# Patient Record
Sex: MALE | Race: WHITE | NOT HISPANIC OR LATINO | Employment: FULL TIME | URBAN - METROPOLITAN AREA
[De-identification: names, ages, dates, MRNs, and addresses within clinical notes are randomized per-mention and may not be internally consistent; named-entity substitution may affect disease eponyms.]

---

## 2017-01-09 ENCOUNTER — APPOINTMENT (OUTPATIENT)
Dept: URGENT CARE | Age: 52
End: 2017-01-09
Payer: COMMERCIAL

## 2017-01-09 ENCOUNTER — HOSPITAL ENCOUNTER (OUTPATIENT)
Dept: RADIOLOGY | Age: 52
Discharge: HOME | End: 2017-01-09
Admitting: NURSE PRACTITIONER
Payer: COMMERCIAL

## 2017-01-09 ENCOUNTER — TRANSCRIBE ORDERS (OUTPATIENT)
Dept: URGENT CARE | Age: 52
End: 2017-01-09

## 2017-01-09 DIAGNOSIS — S59.919A INJURY OF FOREARM: ICD-10-CM

## 2017-01-09 PROCEDURE — G0382 LEV 3 HOSP TYPE B ED VISIT: HCPCS | Performed by: FAMILY MEDICINE

## 2017-01-09 PROCEDURE — 73080 X-RAY EXAM OF ELBOW: CPT

## 2017-01-09 PROCEDURE — 73090 X-RAY EXAM OF FOREARM: CPT

## 2017-01-26 ENCOUNTER — LAB CONVERSION - ENCOUNTER (OUTPATIENT)
Dept: OTHER | Facility: OTHER | Age: 52
End: 2017-01-26

## 2017-01-26 LAB
CHOLEST SERPL-MCNC: 124 MG/DL (ref 125–200)
CHOLEST/HDLC SERPL: 2.6 (CALC)
HDLC SERPL-MCNC: 47 MG/DL
LDL CHOLESTEROL (HISTORICAL): 56 MG/DL (CALC)
NON-HDL-CHOL (CHOL-HDL) (HISTORICAL): 77 MG/DL (CALC)
TRIGL SERPL-MCNC: 103 MG/DL

## 2017-02-09 ENCOUNTER — ALLSCRIPTS OFFICE VISIT (OUTPATIENT)
Dept: OTHER | Facility: OTHER | Age: 52
End: 2017-02-09

## 2017-04-17 ENCOUNTER — ALLSCRIPTS OFFICE VISIT (OUTPATIENT)
Dept: OTHER | Facility: OTHER | Age: 52
End: 2017-04-17

## 2017-04-17 ENCOUNTER — TRANSCRIBE ORDERS (OUTPATIENT)
Dept: ADMINISTRATIVE | Facility: HOSPITAL | Age: 52
End: 2017-04-17

## 2017-04-17 DIAGNOSIS — E78.5 HYPERLIPIDEMIA: ICD-10-CM

## 2017-04-17 DIAGNOSIS — Z95.5 PRESENCE OF CORONARY ANGIOPLASTY IMPLANT AND GRAFT: ICD-10-CM

## 2017-04-17 DIAGNOSIS — I25.10 ATHEROSCLEROTIC HEART DISEASE OF NATIVE CORONARY ARTERY WITHOUT ANGINA PECTORIS: ICD-10-CM

## 2017-04-17 DIAGNOSIS — Z95.5 STENTED CORONARY ARTERY: Primary | ICD-10-CM

## 2017-04-17 DIAGNOSIS — I10 ESSENTIAL (PRIMARY) HYPERTENSION: ICD-10-CM

## 2017-04-21 ENCOUNTER — HOSPITAL ENCOUNTER (OUTPATIENT)
Dept: NON INVASIVE DIAGNOSTICS | Facility: CLINIC | Age: 52
Discharge: HOME | End: 2017-04-21
Payer: COMMERCIAL

## 2017-04-21 ENCOUNTER — ALLSCRIPTS OFFICE VISIT (OUTPATIENT)
Dept: OTHER | Facility: OTHER | Age: 52
End: 2017-04-21

## 2017-04-21 DIAGNOSIS — Z95.5 STENTED CORONARY ARTERY: ICD-10-CM

## 2017-04-21 LAB
ARRHY DURING EX: NORMAL
CHEST PAIN STATEMENT: NORMAL
MAX DIASTOLIC BP: 70 MMHG
MAX HEART RATE: 130 BPM
MAX PREDICTED HEART RATE: 170 BPM
MAX. SYSTOLIC BP: 204 MMHG
PROTOCOL NAME: NORMAL
REASON FOR TERMINATION: NORMAL
TARGET HR FORMULA: NORMAL
TEST INDICATION: NORMAL
TIME IN EXERCISE PHASE: 690 S

## 2017-04-21 PROCEDURE — 93017 CV STRESS TEST TRACING ONLY: CPT

## 2017-04-21 PROCEDURE — A9502 TC99M TETROFOSMIN: HCPCS

## 2017-04-21 PROCEDURE — 78452 HT MUSCLE IMAGE SPECT MULT: CPT

## 2017-04-22 ENCOUNTER — LAB CONVERSION - ENCOUNTER (OUTPATIENT)
Dept: OTHER | Facility: OTHER | Age: 52
End: 2017-04-22

## 2017-04-22 LAB
DEPRECATED RDW RBC AUTO: 12.5 % (ref 11–15)
HCT VFR BLD AUTO: 43.6 % (ref 38.5–50)
HGB BLD-MCNC: 14.6 G/DL (ref 13.2–17.1)
MCH RBC QN AUTO: 30.8 PG (ref 27–33)
MCHC RBC AUTO-ENTMCNC: 33.5 G/DL (ref 32–36)
MCV RBC AUTO: 92.2 FL (ref 80–100)
PLATELET # BLD AUTO: 193 THOUSAND/UL (ref 140–400)
PMV BLD AUTO: 9.4 FL (ref 7.5–12.5)
RBC # BLD AUTO: 4.73 MILLION/UL (ref 4.2–5.8)
WBC # BLD AUTO: 6.6 THOUSAND/UL (ref 3.8–10.8)

## 2017-04-23 ENCOUNTER — LAB CONVERSION - ENCOUNTER (OUTPATIENT)
Dept: OTHER | Facility: OTHER | Age: 52
End: 2017-04-23

## 2017-04-23 LAB
A/G RATIO (HISTORICAL): 1.7 (CALC) (ref 1–2.5)
ALBUMIN SERPL BCP-MCNC: 4.2 G/DL (ref 3.6–5.1)
ALP SERPL-CCNC: 79 U/L (ref 40–115)
ALT SERPL W P-5'-P-CCNC: 24 U/L (ref 9–46)
AST SERPL W P-5'-P-CCNC: 20 U/L (ref 10–35)
BILIRUB SERPL-MCNC: 0.9 MG/DL (ref 0.2–1.2)
BUN SERPL-MCNC: 14 MG/DL (ref 7–25)
BUN/CREA RATIO (HISTORICAL): NORMAL (CALC) (ref 6–22)
CALCIUM SERPL-MCNC: 9.1 MG/DL (ref 8.6–10.3)
CHLORIDE SERPL-SCNC: 105 MMOL/L (ref 98–110)
CO2 SERPL-SCNC: 28 MMOL/L (ref 20–31)
CREAT SERPL-MCNC: 0.9 MG/DL (ref 0.7–1.33)
DEPRECATED RDW RBC AUTO: 12.5 % (ref 11–15)
EGFR AFRICAN AMERICAN (HISTORICAL): 115 ML/MIN/1.73M2
EGFR-AMERICAN CALC (HISTORICAL): 99 ML/MIN/1.73M2
GAMMA GLOBULIN (HISTORICAL): 2.5 G/DL (CALC) (ref 1.9–3.7)
GLUCOSE (HISTORICAL): 98 MG/DL (ref 65–99)
HCT VFR BLD AUTO: 43.6 % (ref 38.5–50)
HGB BLD-MCNC: 14.6 G/DL (ref 13.2–17.1)
INR PPP: 1
MCH RBC QN AUTO: 30.8 PG (ref 27–33)
MCHC RBC AUTO-ENTMCNC: 33.5 G/DL (ref 32–36)
MCV RBC AUTO: 92.2 FL (ref 80–100)
PLATELET # BLD AUTO: 193 THOUSAND/UL (ref 140–400)
PMV BLD AUTO: 9.4 FL (ref 7.5–12.5)
POTASSIUM SERPL-SCNC: 4.4 MMOL/L (ref 3.5–5.3)
PROTHROMBIN TIME: 10.1 SEC (ref 9–11.5)
RBC # BLD AUTO: 4.73 MILLION/UL (ref 4.2–5.8)
SODIUM SERPL-SCNC: 139 MMOL/L (ref 135–146)
TOTAL PROTEIN (HISTORICAL): 6.7 G/DL (ref 6.1–8.1)
WBC # BLD AUTO: 6.6 THOUSAND/UL (ref 3.8–10.8)

## 2017-04-24 ENCOUNTER — HOSPITAL ENCOUNTER (OUTPATIENT)
Dept: NON INVASIVE DIAGNOSTICS | Facility: HOSPITAL | Age: 52
Discharge: HOME | End: 2017-04-25
Attending: INTERNAL MEDICINE | Admitting: INTERNAL MEDICINE
Payer: COMMERCIAL

## 2017-04-24 ENCOUNTER — GENERIC CONVERSION - ENCOUNTER (OUTPATIENT)
Dept: OTHER | Facility: OTHER | Age: 52
End: 2017-04-24

## 2017-04-24 DIAGNOSIS — E78.5 HYPERLIPIDEMIA: ICD-10-CM

## 2017-04-24 DIAGNOSIS — I10 ESSENTIAL (PRIMARY) HYPERTENSION: ICD-10-CM

## 2017-04-24 DIAGNOSIS — I25.10 ATHEROSCLEROTIC HEART DISEASE OF NATIVE CORONARY ARTERY WITHOUT ANGINA PECTORIS: ICD-10-CM

## 2017-04-24 DIAGNOSIS — Z95.5 PRESENCE OF CORONARY ANGIOPLASTY IMPLANT AND GRAFT: ICD-10-CM

## 2017-04-24 LAB
ANION GAP SERPL CALCULATED.3IONS-SCNC: 10 MMOL/L (ref 4–13)
ATRIAL RATE: 56 BPM
ATRIAL RATE: 61 BPM
BUN SERPL-MCNC: 20 MG/DL (ref 5–25)
CALCIUM SERPL-MCNC: 8.9 MG/DL (ref 8.3–10.1)
CHLORIDE SERPL-SCNC: 105 MMOL/L (ref 100–108)
CHOLEST SERPL-MCNC: 110 MG/DL (ref 50–200)
CO2 SERPL-SCNC: 25 MMOL/L (ref 21–32)
CREAT SERPL-MCNC: 0.93 MG/DL (ref 0.6–1.3)
ERYTHROCYTE [DISTWIDTH] IN BLOOD BY AUTOMATED COUNT: 12.3 % (ref 11.6–15.1)
GFR SERPL CREATININE-BSD FRML MDRD: >60 ML/MIN/1.73SQ M
GLUCOSE P FAST SERPL-MCNC: 102 MG/DL (ref 65–99)
GLUCOSE SERPL-MCNC: 102 MG/DL (ref 65–140)
HCT VFR BLD AUTO: 47 % (ref 36.5–49.3)
HDLC SERPL-MCNC: 51 MG/DL (ref 40–60)
HGB BLD-MCNC: 15.8 G/DL (ref 12–17)
INR PPP: 1.07 (ref 0.86–1.16)
LDLC SERPL CALC-MCNC: 46 MG/DL (ref 0–100)
MAGNESIUM SERPL-MCNC: 2 MG/DL (ref 1.6–2.6)
MCH RBC QN AUTO: 30.9 PG (ref 26.8–34.3)
MCHC RBC AUTO-ENTMCNC: 33.6 G/DL (ref 31.4–37.4)
MCV RBC AUTO: 92 FL (ref 82–98)
P AXIS: 33 DEGREES
P AXIS: 4 DEGREES
PLATELET # BLD AUTO: 207 THOUSANDS/UL (ref 149–390)
PMV BLD AUTO: 10.7 FL (ref 8.9–12.7)
POTASSIUM SERPL-SCNC: 4 MMOL/L (ref 3.5–5.3)
PR INTERVAL: 174 MS
PR INTERVAL: 192 MS
PROTHROMBIN TIME: 13.7 SECONDS (ref 12–14.3)
QRS AXIS: 55 DEGREES
QRS AXIS: 87 DEGREES
QRSD INTERVAL: 98 MS
QRSD INTERVAL: 98 MS
QT INTERVAL: 394 MS
QT INTERVAL: 408 MS
QTC INTERVAL: 393 MS
QTC INTERVAL: 396 MS
RBC # BLD AUTO: 5.11 MILLION/UL (ref 3.88–5.62)
SODIUM SERPL-SCNC: 140 MMOL/L (ref 136–145)
T WAVE AXIS: 57 DEGREES
T WAVE AXIS: 73 DEGREES
TRIGL SERPL-MCNC: 66 MG/DL
VENTRICULAR RATE: 56 BPM
VENTRICULAR RATE: 61 BPM
WBC # BLD AUTO: 7.64 THOUSAND/UL (ref 4.31–10.16)

## 2017-04-24 PROCEDURE — 83735 ASSAY OF MAGNESIUM: CPT | Performed by: INTERNAL MEDICINE

## 2017-04-24 PROCEDURE — 99153 MOD SED SAME PHYS/QHP EA: CPT | Performed by: INTERNAL MEDICINE

## 2017-04-24 PROCEDURE — C1769 GUIDE WIRE: HCPCS | Performed by: INTERNAL MEDICINE

## 2017-04-24 PROCEDURE — 93005 ELECTROCARDIOGRAM TRACING: CPT | Performed by: INTERNAL MEDICINE

## 2017-04-24 PROCEDURE — 80061 LIPID PANEL: CPT | Performed by: INTERNAL MEDICINE

## 2017-04-24 PROCEDURE — C1887 CATHETER, GUIDING: HCPCS | Performed by: INTERNAL MEDICINE

## 2017-04-24 PROCEDURE — C1874 STENT, COATED/COV W/DEL SYS: HCPCS

## 2017-04-24 PROCEDURE — 85027 COMPLETE CBC AUTOMATED: CPT | Performed by: INTERNAL MEDICINE

## 2017-04-24 PROCEDURE — 93454 CORONARY ARTERY ANGIO S&I: CPT | Performed by: INTERNAL MEDICINE

## 2017-04-24 PROCEDURE — C9600 PERC DRUG-EL COR STENT SING: HCPCS | Performed by: INTERNAL MEDICINE

## 2017-04-24 PROCEDURE — 85610 PROTHROMBIN TIME: CPT | Performed by: INTERNAL MEDICINE

## 2017-04-24 PROCEDURE — 85347 COAGULATION TIME ACTIVATED: CPT

## 2017-04-24 PROCEDURE — C1725 CATH, TRANSLUMIN NON-LASER: HCPCS | Performed by: INTERNAL MEDICINE

## 2017-04-24 PROCEDURE — 99152 MOD SED SAME PHYS/QHP 5/>YRS: CPT | Performed by: INTERNAL MEDICINE

## 2017-04-24 PROCEDURE — 93005 ELECTROCARDIOGRAM TRACING: CPT

## 2017-04-24 PROCEDURE — 93572 IV DOP VEL&/PRESS C FLO EA: CPT | Performed by: INTERNAL MEDICINE

## 2017-04-24 PROCEDURE — C1894 INTRO/SHEATH, NON-LASER: HCPCS | Performed by: INTERNAL MEDICINE

## 2017-04-24 PROCEDURE — 80048 BASIC METABOLIC PNL TOTAL CA: CPT | Performed by: INTERNAL MEDICINE

## 2017-04-24 PROCEDURE — 93571 IV DOP VEL&/PRESS C FLO 1ST: CPT | Performed by: INTERNAL MEDICINE

## 2017-04-24 RX ORDER — FENTANYL CITRATE 50 UG/ML
INJECTION, SOLUTION INTRAMUSCULAR; INTRAVENOUS CODE/TRAUMA/SEDATION MEDICATION
Status: COMPLETED | OUTPATIENT
Start: 2017-04-24 | End: 2017-04-24

## 2017-04-24 RX ORDER — SODIUM CHLORIDE 9 MG/ML
125 INJECTION, SOLUTION INTRAVENOUS CONTINUOUS
Status: DISCONTINUED | OUTPATIENT
Start: 2017-04-24 | End: 2017-04-25 | Stop reason: HOSPADM

## 2017-04-24 RX ORDER — ATORVASTATIN CALCIUM 40 MG/1
80 TABLET, FILM COATED ORAL
Status: DISCONTINUED | OUTPATIENT
Start: 2017-04-24 | End: 2017-04-25 | Stop reason: HOSPADM

## 2017-04-24 RX ORDER — ASPIRIN 81 MG/1
81 TABLET, CHEWABLE ORAL DAILY
Status: DISCONTINUED | OUTPATIENT
Start: 2017-04-25 | End: 2017-04-25 | Stop reason: HOSPADM

## 2017-04-24 RX ORDER — ASPIRIN 81 MG/1
81 TABLET, CHEWABLE ORAL DAILY
Status: DISCONTINUED | OUTPATIENT
Start: 2017-04-25 | End: 2017-04-24

## 2017-04-24 RX ORDER — ASPIRIN 81 MG/1
324 TABLET, CHEWABLE ORAL ONCE
Status: COMPLETED | OUTPATIENT
Start: 2017-04-24 | End: 2017-04-24

## 2017-04-24 RX ORDER — ONDANSETRON 2 MG/ML
4 INJECTION INTRAMUSCULAR; INTRAVENOUS EVERY 6 HOURS PRN
Status: DISCONTINUED | OUTPATIENT
Start: 2017-04-24 | End: 2017-04-25 | Stop reason: HOSPADM

## 2017-04-24 RX ORDER — SODIUM CHLORIDE 9 MG/ML
100 INJECTION, SOLUTION INTRAVENOUS CONTINUOUS
Status: DISPENSED | OUTPATIENT
Start: 2017-04-24 | End: 2017-04-24

## 2017-04-24 RX ORDER — CARVEDILOL 12.5 MG/1
12.5 TABLET ORAL 2 TIMES DAILY WITH MEALS
Status: DISCONTINUED | OUTPATIENT
Start: 2017-04-24 | End: 2017-04-25 | Stop reason: HOSPADM

## 2017-04-24 RX ORDER — CLOPIDOGREL BISULFATE 75 MG/1
600 TABLET ORAL ONCE
Status: DISCONTINUED | OUTPATIENT
Start: 2017-04-24 | End: 2017-04-24

## 2017-04-24 RX ORDER — MAGNESIUM HYDROXIDE/ALUMINUM HYDROXICE/SIMETHICONE 120; 1200; 1200 MG/30ML; MG/30ML; MG/30ML
30 SUSPENSION ORAL EVERY 6 HOURS PRN
Status: DISCONTINUED | OUTPATIENT
Start: 2017-04-24 | End: 2017-04-25 | Stop reason: HOSPADM

## 2017-04-24 RX ORDER — LIDOCAINE HYDROCHLORIDE 10 MG/ML
INJECTION, SOLUTION INFILTRATION; PERINEURAL CODE/TRAUMA/SEDATION MEDICATION
Status: COMPLETED | OUTPATIENT
Start: 2017-04-24 | End: 2017-04-24

## 2017-04-24 RX ORDER — MIDAZOLAM HYDROCHLORIDE 1 MG/ML
INJECTION INTRAMUSCULAR; INTRAVENOUS CODE/TRAUMA/SEDATION MEDICATION
Status: COMPLETED | OUTPATIENT
Start: 2017-04-24 | End: 2017-04-24

## 2017-04-24 RX ORDER — ADENOSINE 3 MG/ML
INJECTION, SOLUTION INTRAVENOUS
Status: COMPLETED | OUTPATIENT
Start: 2017-04-24 | End: 2017-04-24

## 2017-04-24 RX ORDER — HEPARIN SODIUM 1000 [USP'U]/ML
INJECTION, SOLUTION INTRAVENOUS; SUBCUTANEOUS CODE/TRAUMA/SEDATION MEDICATION
Status: COMPLETED | OUTPATIENT
Start: 2017-04-24 | End: 2017-04-24

## 2017-04-24 RX ORDER — NITROGLYCERIN 0.4 MG/1
0.4 TABLET SUBLINGUAL
Status: DISCONTINUED | OUTPATIENT
Start: 2017-04-24 | End: 2017-04-25 | Stop reason: HOSPADM

## 2017-04-24 RX ORDER — VERAPAMIL HCL 2.5 MG/ML
AMPUL (ML) INTRAVENOUS CODE/TRAUMA/SEDATION MEDICATION
Status: COMPLETED | OUTPATIENT
Start: 2017-04-24 | End: 2017-04-24

## 2017-04-24 RX ORDER — NITROGLYCERIN 20 MG/100ML
INJECTION INTRAVENOUS CODE/TRAUMA/SEDATION MEDICATION
Status: COMPLETED | OUTPATIENT
Start: 2017-04-24 | End: 2017-04-24

## 2017-04-24 RX ADMIN — FENTANYL CITRATE 25 MCG: 50 INJECTION INTRAMUSCULAR; INTRAVENOUS at 11:56

## 2017-04-24 RX ADMIN — HEPARIN SODIUM 4000 UNITS: 1000 INJECTION INTRAVENOUS; SUBCUTANEOUS at 11:25

## 2017-04-24 RX ADMIN — FENTANYL CITRATE 50 MCG: 50 INJECTION INTRAMUSCULAR; INTRAVENOUS at 11:12

## 2017-04-24 RX ADMIN — ADENOSINE 180 MCG/KG/MIN: 3 SOLUTION INTRAVENOUS at 11:45

## 2017-04-24 RX ADMIN — MIDAZOLAM HYDROCHLORIDE 1 MG: 1 INJECTION, SOLUTION INTRAMUSCULAR; INTRAVENOUS at 12:02

## 2017-04-24 RX ADMIN — HEPARIN SODIUM 4000 UNITS: 1000 INJECTION INTRAVENOUS; SUBCUTANEOUS at 11:49

## 2017-04-24 RX ADMIN — VERAPAMIL HYDROCHLORIDE 1.25 MG: 2.5 INJECTION, SOLUTION INTRAVENOUS at 11:25

## 2017-04-24 RX ADMIN — CARVEDILOL 12.5 MG: 12.5 TABLET, FILM COATED ORAL at 17:45

## 2017-04-24 RX ADMIN — ATORVASTATIN CALCIUM 80 MG: 40 TABLET, FILM COATED ORAL at 17:45

## 2017-04-24 RX ADMIN — MIDAZOLAM HYDROCHLORIDE 1 MG: 1 INJECTION, SOLUTION INTRAMUSCULAR; INTRAVENOUS at 11:20

## 2017-04-24 RX ADMIN — LIDOCAINE HYDROCHLORIDE 0.1 ML: 10 INJECTION, SOLUTION INFILTRATION; PERINEURAL at 11:22

## 2017-04-24 RX ADMIN — ASPIRIN 324 MG: 81 TABLET, CHEWABLE ORAL at 08:30

## 2017-04-24 RX ADMIN — MIDAZOLAM HYDROCHLORIDE 2 MG: 1 INJECTION, SOLUTION INTRAMUSCULAR; INTRAVENOUS at 11:13

## 2017-04-24 RX ADMIN — MIDAZOLAM HYDROCHLORIDE 1 MG: 1 INJECTION, SOLUTION INTRAMUSCULAR; INTRAVENOUS at 11:55

## 2017-04-24 RX ADMIN — HEPARIN SODIUM 4000 UNITS: 1000 INJECTION INTRAVENOUS; SUBCUTANEOUS at 12:06

## 2017-04-24 RX ADMIN — IOHEXOL 215 ML: 350 INJECTION, SOLUTION INTRAVENOUS at 12:31

## 2017-04-24 RX ADMIN — TICAGRELOR 90 MG: 90 TABLET ORAL at 10:10

## 2017-04-24 RX ADMIN — TICAGRELOR 90 MG: 90 TABLET ORAL at 21:53

## 2017-04-24 RX ADMIN — SODIUM CHLORIDE 125 ML/HR: 0.9 INJECTION, SOLUTION INTRAVENOUS at 08:26

## 2017-04-24 RX ADMIN — SODIUM CHLORIDE 125 ML/HR: 0.9 INJECTION, SOLUTION INTRAVENOUS at 12:01

## 2017-04-24 RX ADMIN — NITROGLYCERIN 200 MCG: 20 INJECTION INTRAVENOUS at 11:25

## 2017-04-24 RX ADMIN — FENTANYL CITRATE 25 MCG: 50 INJECTION INTRAMUSCULAR; INTRAVENOUS at 11:19

## 2017-04-24 RX ADMIN — FENTANYL CITRATE 25 MCG: 50 INJECTION INTRAMUSCULAR; INTRAVENOUS at 12:02

## 2017-04-25 VITALS
HEIGHT: 74 IN | WEIGHT: 249.34 LBS | OXYGEN SATURATION: 95 % | BODY MASS INDEX: 32 KG/M2 | HEART RATE: 66 BPM | DIASTOLIC BLOOD PRESSURE: 73 MMHG | TEMPERATURE: 98.5 F | SYSTOLIC BLOOD PRESSURE: 128 MMHG | RESPIRATION RATE: 20 BRPM

## 2017-04-25 LAB
ANION GAP SERPL CALCULATED.3IONS-SCNC: 10 MMOL/L (ref 4–13)
BUN SERPL-MCNC: 10 MG/DL (ref 5–25)
CALCIUM SERPL-MCNC: 9.1 MG/DL (ref 8.3–10.1)
CHLORIDE SERPL-SCNC: 100 MMOL/L (ref 100–108)
CO2 SERPL-SCNC: 26 MMOL/L (ref 21–32)
CREAT SERPL-MCNC: 1.01 MG/DL (ref 0.6–1.3)
ERYTHROCYTE [DISTWIDTH] IN BLOOD BY AUTOMATED COUNT: 12 % (ref 11.6–15.1)
GFR SERPL CREATININE-BSD FRML MDRD: >60 ML/MIN/1.73SQ M
GLUCOSE SERPL-MCNC: 165 MG/DL (ref 65–140)
HCT VFR BLD AUTO: 44.9 % (ref 36.5–49.3)
HGB BLD-MCNC: 15.3 G/DL (ref 12–17)
MCH RBC QN AUTO: 30.7 PG (ref 26.8–34.3)
MCHC RBC AUTO-ENTMCNC: 34.1 G/DL (ref 31.4–37.4)
MCV RBC AUTO: 90 FL (ref 82–98)
PLATELET # BLD AUTO: 204 THOUSANDS/UL (ref 149–390)
PMV BLD AUTO: 10.5 FL (ref 8.9–12.7)
POTASSIUM SERPL-SCNC: 4 MMOL/L (ref 3.5–5.3)
RBC # BLD AUTO: 4.98 MILLION/UL (ref 3.88–5.62)
SODIUM SERPL-SCNC: 136 MMOL/L (ref 136–145)
WBC # BLD AUTO: 8.42 THOUSAND/UL (ref 4.31–10.16)

## 2017-04-25 PROCEDURE — 80048 BASIC METABOLIC PNL TOTAL CA: CPT | Performed by: NURSE PRACTITIONER

## 2017-04-25 PROCEDURE — 85027 COMPLETE CBC AUTOMATED: CPT | Performed by: NURSE PRACTITIONER

## 2017-04-25 RX ORDER — ASPIRIN 81 MG/1
81 TABLET, CHEWABLE ORAL DAILY
Qty: 30 TABLET | Refills: 0 | Status: SHIPPED | OUTPATIENT
Start: 2017-04-25 | End: 2017-05-25

## 2017-04-25 RX ORDER — ATORVASTATIN CALCIUM 80 MG/1
80 TABLET, FILM COATED ORAL
Qty: 30 TABLET | Refills: 0 | Status: SHIPPED | OUTPATIENT
Start: 2017-04-25 | End: 2017-05-25

## 2017-04-25 RX ORDER — CARVEDILOL 12.5 MG/1
12.5 TABLET ORAL 2 TIMES DAILY WITH MEALS
Qty: 60 TABLET | Refills: 0 | Status: SHIPPED | OUTPATIENT
Start: 2017-04-25 | End: 2018-03-15 | Stop reason: SDUPTHER

## 2017-04-25 RX ORDER — NITROGLYCERIN 0.4 MG/1
0.4 TABLET SUBLINGUAL
Qty: 90 TABLET | Refills: 0 | Status: SHIPPED | OUTPATIENT
Start: 2017-04-25 | End: 2018-03-15 | Stop reason: SDUPTHER

## 2017-04-25 RX ADMIN — CARVEDILOL 12.5 MG: 12.5 TABLET, FILM COATED ORAL at 08:03

## 2017-04-25 RX ADMIN — TICAGRELOR 90 MG: 90 TABLET ORAL at 10:19

## 2017-04-25 RX ADMIN — ASPIRIN 81 MG: 81 TABLET, CHEWABLE ORAL at 08:03

## 2017-04-27 LAB
KCT BLD-ACNC: 218 SEC (ref 89–137)
SPECIMEN SOURCE: ABNORMAL

## 2017-05-02 ENCOUNTER — LAB CONVERSION - ENCOUNTER (OUTPATIENT)
Dept: OTHER | Facility: OTHER | Age: 52
End: 2017-05-02

## 2017-05-02 LAB
BUN SERPL-MCNC: 17 MG/DL (ref 7–25)
BUN/CREA RATIO (HISTORICAL): NORMAL (CALC) (ref 6–22)
CALCIUM SERPL-MCNC: 8.9 MG/DL (ref 8.6–10.3)
CHLORIDE SERPL-SCNC: 104 MMOL/L (ref 98–110)
CO2 SERPL-SCNC: 26 MMOL/L (ref 20–31)
CREAT SERPL-MCNC: 1 MG/DL (ref 0.7–1.33)
EGFR AFRICAN AMERICAN (HISTORICAL): 101 ML/MIN/1.73M2
EGFR-AMERICAN CALC (HISTORICAL): 87 ML/MIN/1.73M2
GLUCOSE (HISTORICAL): 80 MG/DL (ref 65–99)
POTASSIUM SERPL-SCNC: 4.6 MMOL/L (ref 3.5–5.3)
SODIUM SERPL-SCNC: 139 MMOL/L (ref 135–146)

## 2017-05-03 ENCOUNTER — LAB CONVERSION - ENCOUNTER (OUTPATIENT)
Dept: OTHER | Facility: OTHER | Age: 52
End: 2017-05-03

## 2017-05-03 LAB
BASOPHILS # BLD AUTO: 0.4 %
BASOPHILS # BLD AUTO: 26 CELLS/UL (ref 0–200)
BUN SERPL-MCNC: 17 MG/DL (ref 7–25)
BUN/CREA RATIO (HISTORICAL): NORMAL (CALC) (ref 6–22)
CALCIUM SERPL-MCNC: 8.9 MG/DL (ref 8.6–10.3)
CHLORIDE SERPL-SCNC: 104 MMOL/L (ref 98–110)
CO2 SERPL-SCNC: 26 MMOL/L (ref 20–31)
CREAT SERPL-MCNC: 1 MG/DL (ref 0.7–1.33)
DEPRECATED RDW RBC AUTO: 12.2 % (ref 11–15)
EGFR AFRICAN AMERICAN (HISTORICAL): 101 ML/MIN/1.73M2
EGFR-AMERICAN CALC (HISTORICAL): 87 ML/MIN/1.73M2
EOSINOPHIL # BLD AUTO: 158 CELLS/UL (ref 15–500)
EOSINOPHIL # BLD AUTO: 2.4 %
GLUCOSE (HISTORICAL): 80 MG/DL (ref 65–99)
HCT VFR BLD AUTO: 41.7 % (ref 38.5–50)
HGB BLD-MCNC: 14.1 G/DL (ref 13.2–17.1)
LYMPHOCYTES # BLD AUTO: 1313 CELLS/UL (ref 850–3900)
LYMPHOCYTES # BLD AUTO: 19.9 %
MCH RBC QN AUTO: 31.1 PG (ref 27–33)
MCHC RBC AUTO-ENTMCNC: 33.8 G/DL (ref 32–36)
MCV RBC AUTO: 92.1 FL (ref 80–100)
MONOCYTES # BLD AUTO: 422 CELLS/UL (ref 200–950)
MONOCYTES (HISTORICAL): 6.4 %
NEUTROPHILS # BLD AUTO: 4679 CELLS/UL (ref 1500–7800)
NEUTROPHILS # BLD AUTO: 70.9 %
PLATELET # BLD AUTO: 215 THOUSAND/UL (ref 140–400)
PMV BLD AUTO: 9.6 FL (ref 7.5–12.5)
POTASSIUM SERPL-SCNC: 4.6 MMOL/L (ref 3.5–5.3)
RBC # BLD AUTO: 4.53 MILLION/UL (ref 4.2–5.8)
SODIUM SERPL-SCNC: 139 MMOL/L (ref 135–146)
WBC # BLD AUTO: 6.6 THOUSAND/UL (ref 3.8–10.8)

## 2017-05-11 ENCOUNTER — ALLSCRIPTS OFFICE VISIT (OUTPATIENT)
Dept: OTHER | Facility: OTHER | Age: 52
End: 2017-05-11

## 2017-05-31 ENCOUNTER — GENERIC CONVERSION - ENCOUNTER (OUTPATIENT)
Dept: OTHER | Facility: OTHER | Age: 52
End: 2017-05-31

## 2017-07-06 ENCOUNTER — GENERIC CONVERSION - ENCOUNTER (OUTPATIENT)
Dept: OTHER | Facility: OTHER | Age: 52
End: 2017-07-06

## 2017-09-19 ENCOUNTER — ALLSCRIPTS OFFICE VISIT (OUTPATIENT)
Dept: OTHER | Facility: OTHER | Age: 52
End: 2017-09-19

## 2017-10-26 NOTE — PROGRESS NOTES
Assessment  Assessed    1  Coronary artery disease (414 00) (I25 10)   2  Benign essential hypertension (401 1) (I10)    Plan  Benign essential hypertension    · Carvedilol 12 5 MG Oral Tablet; TAKE 1 TABLET TWICE DAILY   Rx By: Shayne Conteh; Dispense: 30 Days ; #:60 Tablet; Refill: 5;For: Benign essential hypertension; HILARIO = N; Verified Transmission to [facility]; Last Updated By: System, [employer]; 9/19/2017 4:02:04 PM  Coronary artery disease    · Brilinta 90 MG Oral Tablet; TAKE 1 TABLET TWICE DAILY   Rx By: Shayne Conteh; Dispense: 30 Days ; #:60 Tablet; Refill: 5;For: Coronary artery disease; HILARIO = N; Verified Transmission to [address] [facility] [address]; Last Updated By: System, [employer]; 9/19/2017 4:02:04 PM  Dyslipidemia    · Atorvastatin Calcium 80 MG Oral Tablet; TAKE 1 TABLET DAILY   Rx By: Shayne Conteh; Dispense: 30 Days ; #:30 Tablet; Refill: 5;For: Dyslipidemia; HILARIO = N; Verified Transmission to [facility]; Last Updated By: System, [employer]Script[employer]; 9/19/2017 4:02:03 PM    Discussion/Summary  Cardiology Discussion Summary Free Text Note Form [facility]:   1  Coronary Artery Disease: Hx of XIOMY to LAD and anterior MI  He also had PCI to the the OM and RCA  Dyslipidemia: Continue Atorvastatin at higher dose  Lipids have been stable  HTN: BP mildly high today  He will check at home  Counseling Documentation With Imm: The patient was counseled regarding diagnostic results,-- instructions for management,-- risk factor reductions,-- impressions  total time of encounter was 25 minutes-- and-- 15 minutes was spent counseling  Chief Complaint  Chief Complaint Free Text Note Form: 4 month cardiac follow up  No cardiac complaints  History of Present Illness  Cardiology HPI Free Text Note Form [facility]: Followup for CAD  well  No chest pain, no dyspnea, no palps  Coronary Artery Disease (Follow-Up):  The patient states he has been stable with his coronary artery disease symptoms since the last visit  Symptoms: denies chest pain when at rest,-- denies exertional chest pain,-- denies dyspnea,-- denies fatigue-- and-- denies exercise intolerance  Associated symptoms include no syncope,-- no palpitations,-- no PND,-- no orthopnea-- and-- no edema  Review of Systems  Cardiology Male ROS:     Cardiac: No complaints of chest pain, no palpitations, no fainiting  Skin: No complaints of nonhealing sores or skin rash  Genitourinary: No complaints of recurrent urinary tract infections, frequent urination at night, difficult urination, blood in urine, kidney stones, loss of bladder control, no kidney or prostate problems, no erectile dysfunction  Psychological: No complaints of feeling depressed, anxiety, panic attacks, or difficulty concentrating  General: No complaints of trouble sleeping, lack of energy, fatigue, appetite changes, weight changes, fever, frequent infections, or night sweats  Respiratory: No complaints of shortness of breath, cough with sputum, or wheezing  HEENT: No complaints of serious problems, hearing problems, nose problems, throat problems, or snoring  Gastrointestinal: No complaints of liver problems, nausea, vomiting, heartburn, constipation, bloody stools, diarrhea, problems swallowing, adbominal pain, or rectal bleeding  Hematologic: No complaints of bleeding disorders, anemia, blood clots, or excessive brusing  Neurological: No complaints of numbness, tingling, dizziness, weakness, seizures, headaches, syncope or fainting, AM fatigue, daytime sleepiness, no witnessed apnea episodes  Musculoskeletal: No complaints of arthritis, back pain, or painfull swelling  ROS Reviewed:   ROS reviewed  Active Problems  Problems    1  Arm pain (729 5) (M79 603)   2  Atherosclerosis (440 9) (I70 90)   3  Benign essential hypertension (401 1) (I10)   4  Coronary artery disease (414 00) (I25 10)   5  Dyslipidemia (272 4) (E78 5)   6   External hemorrhoid, thrombosed (455 4) (K64 5)   7  Fatigue (780 79) (R53 83)   8  Fatty liver (571 8) (K76 0)   9  Injury, forearm (959 3) (S59 919A)   10  Lipid screening (V77 91) (Z13 220)   11  Non-ST elevation (NSTEMI) myocardial infarction (410 70) (I21 4)   12  Obesity (278 00) (E66 9)   13  Plantar warts (078 12) (B07 0)   14  S/P drug eluting coronary stent placement (V45 82) (Z95 5)    Past Medical History  Problems    1  History of X-Ray Ankle Fracture  Active Problems And Past Medical History Reviewed: The active problems and past medical history were reviewed and updated today  Surgical History  Problems    1  History of Adenoidectomy   2  History of Cath Stent Placement  Surgical History Reviewed: The surgical history was reviewed and updated today  Family History  Mother    1  Family history of Carcinoma Of The Tongue (V16 0)   2  Family history of Diabetes Mellitus (V18 0)   3  Family history of Hypertension (V17 49)  Father    4  Family history of Cancer  Family History Reviewed: The family history was reviewed and updated today  Social History  Problems    · Being A Social Drinker   · Educational Level - Completed Bachelors Degree   · Marital History - Currently    · Never A Smoker   · Occupation:  Social History Reviewed: The social history was reviewed and updated today  Current Meds   1  Aspirin 81 MG TABS; TAKE 1 TABLET DAILY; Therapy: (Recorded:05Apr2016) to Recorded   2  Atorvastatin Calcium 80 MG Oral Tablet; TAKE 1 TABLET DAILY  Requested for:   72VFU7200; Last Rx:[id number] Ordered   3  Brilinta 90 MG Oral Tablet; TAKE 1 TABLET TWICE DAILY; Therapy: 21Apr2017 to (Evaluate:16Apr2018)  Requested for: 21Apr2017; Last   Rx:21Apr2017 Ordered   4  Carvedilol 12 5 MG Oral Tablet; TAKE 1 TABLET TWICE DAILY  Requested for:   31YQL1969; Last Rx:[id number] Ordered   5   Nitroglycerin 0 4 MG Sublingual Tablet Sublingual; DISSOLVE 1 TABLET UNDER THE   TONGUE AS NEEDED FOR CHEST PAIN  Requested for: 92ZFR3291; Last   Rx:[id number] Ordered  Medication List Reviewed: The medication list was reviewed and updated today  Allergies  Medication    1  No Known Drug Allergies    Vitals  Vital Signs    Recorded: 19Sep2017 04:01PM   Heart Rate 63, R Radial   Pulse Quality Normal, R Radial   Systolic 697, LUE, Sitting   Diastolic 86, LUE, Sitting   Height 6 ft 2 in   Weight 257 lb    BMI Calculated 33   BSA Calculated 2 42   O2 Saturation 97, RA     Physical Exam    Constitutional   General appearance: No acute distress, well appearing and well nourished  Eyes   Conjunctiva and Sclera examination: Conjunctiva pink, sclera anicteric  Ears, Nose, Mouth, and Throat - Oropharynx: Clear, nares are clear, mucous membranes are moist    Neck   Neck and thyroid: Normal, supple, trachea midline, no thyromegaly  Pulmonary   Respiratory effort: No increased work of breathing or signs of respiratory distress  Auscultation of lungs: Clear to auscultation, no rales, no rhonchi, no wheezing, good air movement  Cardiovascular   Auscultation of heart: Normal rate and rhythm, normal S1 and S2, no murmurs  Carotid pulses: Normal, 2+ bilaterally  Peripheral vascular exam: Normal pulses throughout, no tenderness, erythema or swelling  Pedal pulses: Normal, 2+ bilaterally  Examination of extremities for edema and/or varicosities: Normal     Abdomen   Abdomen: Non-tender and no distention  Liver and spleen: No hepatomegaly or splenomegaly  Musculoskeletal Gait and station: Normal gait  -- Digits and nails: Normal without clubbing or cyanosis  -- Inspection/palpation of joints, bones, and muscles: Normal, ROM normal     Skin - Skin and subcutaneous tissue: Normal without rashes or lesions  Skin is warm and well perfused, normal turgor  Neurologic - Cranial nerves: II - XII intact  -- Speech: Normal     Psychiatric - Orientation to person, place, and time: Normal -- Mood and affect: Normal       Results/Data  Diagnostic Studies Reviewed Cardio: I personally reviewed the recording/images in the office today  My interpretation follows  (1) LIPID PANEL, FASTING 24Apr2017 08:22AM Janelle Houston     Test Name Result Flag Reference   CHOLESTEROL 110 mg/dL     HDL,DIRECT 51 mg/dL  40-60   Specimen collection should occur prior to Metamizole administration due to the potential for falsely depressed results  LDL CHOLESTEROL CALCULATED 46 mg/dL  0-100   Triglyceride:         Normal              <150 mg/dl       Borderline High    150-199 mg/dl       High               200-499 mg/dl       Very High          >499 mg/dl  Cholesterol:         Desirable        <200 mg/dl      Borderline High  200-239 mg/dl      High             >239 mg/dl  HDL Cholesterol:        High    >59 mg/dL      Low     <41 mg/dL  LDL CALCULATED:    This screening LDL is a calculated result  It does not have the accuracy of the Direct Measured LDL in the monitoring of patients with hyperlipidemia and/or statin therapy  Direct Measure LDL (TXH332) must be ordered separately in these patients  TRIGLYCERIDES 66 mg/dL  <=150   Specimen collection should occur prior to N-Acetylcysteine or Metamizole administration due to the potential for falsely depressed results       Signatures   Electronically signed by : JUANJOSE Palacios ; Sep 19 2017  4:25PM EST                       (Author)

## 2017-11-29 ENCOUNTER — APPOINTMENT (OUTPATIENT)
Dept: LAB | Age: 52
End: 2017-11-29
Attending: PHYSICIAN ASSISTANT
Payer: COMMERCIAL

## 2017-11-29 ENCOUNTER — TRANSCRIBE ORDERS (OUTPATIENT)
Dept: ADMINISTRATIVE | Age: 52
End: 2017-11-29

## 2017-11-29 ENCOUNTER — LAB REQUISITION (OUTPATIENT)
Dept: LAB | Facility: HOSPITAL | Age: 52
End: 2017-11-29
Payer: COMMERCIAL

## 2017-11-29 ENCOUNTER — OFFICE VISIT (OUTPATIENT)
Dept: URGENT CARE | Age: 52
End: 2017-11-29
Payer: COMMERCIAL

## 2017-11-29 DIAGNOSIS — J06.9 ACUTE UPPER RESPIRATORY INFECTION: ICD-10-CM

## 2017-11-29 PROCEDURE — S9083 URGENT CARE CENTER GLOBAL: HCPCS | Performed by: FAMILY MEDICINE

## 2017-11-29 PROCEDURE — 87430 STREP A AG IA: CPT | Performed by: FAMILY MEDICINE

## 2017-11-29 PROCEDURE — 87070 CULTURE OTHR SPECIMN AEROBIC: CPT | Performed by: PHYSICIAN ASSISTANT

## 2017-11-29 PROCEDURE — G0382 LEV 3 HOSP TYPE B ED VISIT: HCPCS | Performed by: FAMILY MEDICINE

## 2017-12-01 LAB — BACTERIA THROAT CULT: NORMAL

## 2017-12-05 NOTE — PROGRESS NOTES
Assessment    1  URTI (acute upper respiratory infection) (465 9) (J06 9)    Plan  URTI (acute upper respiratory infection)    · Amoxicillin 500 MG Oral Capsule; TAKE 1 CAPSULE 3 TIMES DAILY UNTIL GONE   · Drink plenty of fluids ; Status:Complete;   Done: 85ECT7444   · Resume activity to your tolerance ; Status:Complete;   Done: 40TZO6052   · Use a cool mist humidifier in the room ; Status:Complete;   Done: 26ARW1602   · We suggest that you try a probiotic supplement ; Status:Complete;   Done: 91UJF3022   · (1) THROAT CULTURE (CULTURE, UPPER RESPIRATORY); Status:Active; Requested  for:29Nov2017;    · Rapid StrepA- POC; Source:Throat; Status:Resulted - Requires Verification;   Done:  54HFT6596 02:19PM    Discussion/Summary  Discussion Summary:   Over-the-counter medications as needed for symptomatic care  Medication Side Effects Reviewed: Possible side effects of new medications were reviewed with the patient/guardian today  Understands and agrees with treatment plan: The treatment plan was reviewed with the patient/guardian  The patient/guardian understands and agrees with the treatment plan   Counseling Documentation With Imm: The patient was counseled regarding instructions for management, prognosis, patient and family education, impressions, risks and benefits of treatment options, importance of compliance with treatment  Follow Up Instructions: Follow Up with your Primary Care Provider in 3-4 days  If your symptoms worsen, go to the nearest [facility] Emergency Department  Chief Complaint    1  Cold Symptoms  Chief Complaint Free Text Note Form: C/O a sore throat, congestion, runny nose, post nasal drip and a cough 4 days  History of Present Illness  HPI: Patient with complaints of sore throat which started about 4 days ago  He also has some congestion and runny nose and occasional intermittent cough     Hospital Based Practices Required Assessment:   Pain Assessment   the patient states they do not have pain  Abuse And Domestic Violence Screen    Yes, the patient is safe at home  The patient states no one is hurting them  Depression And Suicide Screen  No, the patient has not had thoughts of hurting themself  No, the patient has not felt depressed in the past 7 days  Cold Symptoms: Zurdo Martínez presents with complaints of cold symptoms  Associated symptoms include nasal congestion, runny nose, post nasal drainage, sore throat, dry cough and swollen lymph nodes, but no sneezing, no scratchy throat, no hoarseness, no productive cough, no facial pressure, no facial pain, no headache, no plugged ear(s), no ear pain, no wheezing, no shortness of breath, no fatigue, no weakness, no nausea, no vomiting, no diarrhea, no fever and no chills  Review of Systems  Focused-Male:   Constitutional: as noted in HPI    ENT: as noted in HPI  Respiratory: as noted in HPI  Active Problems    1  Arm pain (729 5) (M79 603)   2  Atherosclerosis (440 9) (I70 90)   3  Benign essential hypertension (401 1) (I10)   4  Coronary artery disease (414 00) (I25 10)   5  Dyslipidemia (272 4) (E78 5)   6  External hemorrhoid, thrombosed (455 4) (K64 5)   7  Fatigue (780 79) (R53 83)   8  Fatty liver (571 8) (K76 0)   9  Injury, forearm (959 3) (S59 919A)   10  Lipid screening (V77 91) (Z13 220)   11  Non-ST elevation (NSTEMI) myocardial infarction (410 70) (I21 4)   12  Obesity (278 00) (E66 9)   13  Plantar warts (078 12) (B07 0)   14  S/P drug eluting coronary stent placement (V45 82) (Z95 5)    Past Medical History    1  History of X-Ray Ankle Fracture  Active Problems And Past Medical History Reviewed: The active problems and past medical history were reviewed and updated today  Family History  Mother    1  Family history of Carcinoma Of The Tongue (V16 0)   2  Family history of Diabetes Mellitus (V18 0)   3  Family history of Hypertension (V17 49)  Father    4   Family history of Cancer  Family History Reviewed: The family history was reviewed and updated today  Social History    · Being A Social Drinker   · Educational Level - Completed Bachelors Degree   · Marital History - Currently    · Never A Smoker   · Occupation:  Social History Reviewed: The social history was reviewed and updated today  The social history was reviewed and is unchanged  Surgical History    1  History of Adenoidectomy   2  History of Cath Stent Placement  Surgical History Reviewed: The surgical history was reviewed and updated today  Current Meds   1  Aspirin 81 MG TABS; TAKE 1 TABLET DAILY; Therapy: (Recorded:05Apr2016) to Recorded   2  Atorvastatin Calcium 80 MG Oral Tablet; Take 1 tablet daily  Requested for: 20Nov2017;   Last Rx:20Nov2017 Ordered   3  Brilinta 90 MG Oral Tablet; TAKE 1 TABLET TWICE DAILY; Therapy: 21Apr2017 to (Evaluate:80Ujn9898)  Requested for: 22Nov2017; Last   Rx:22Nov2017 Ordered   4  Carvedilol 12 5 MG Oral Tablet; TAKE 1 TABLET TWICE DAILY  Requested for:   42JLN6587; Last Rx:20Nov2017 Ordered   5  Nitroglycerin 0 4 MG Sublingual Tablet Sublingual; DISSOLVE 1 TABLET UNDER THE   TONGUE AS NEEDED FOR CHEST PAIN  Requested for: 31VTO6202; Last   Rx:[id number] Ordered  Medication List Reviewed: The medication list was reviewed and updated today  Allergies    1  No Known Drug Allergies    Vitals  Signs   Recorded: 04SYN6750 01:49PM   Temperature: 97 5 F, Temporal  Heart Rate: 73  Respiration: 18  Blood Pressure: 146 mm Hg, LUE, Sitting  Blood Pressure: 88 mm Hg, LUE, Sitting  Height: 6 ft 2 in  Weight: 257 lb   BMI Calculated: 33 kg/m2  BSA Calculated: 2 42 m2  O2 Saturation: 98    Physical Exam    Constitutional   General appearance: No acute distress, well appearing and well nourished  Eyes   Conjunctiva and lids: No swelling, erythema, or discharge  Pupils and irises: Equal, round and reactive to light      Ears, Nose, Mouth, and Throat   External inspection of ears and nose: Normal     Otoscopic examination: Tympanic membrance translucent with normal light reflex  Canals patent without erythema  Bilateral nasal congestion mild  Bilateral nasal erythema mild  Note discharge  Bilateral tonsillar erythema with +2 soft tissue swelling  No exudate  Pulmonary   Respiratory effort: No increased work of breathing or signs of respiratory distress  Auscultation of lungs: Clear to auscultation  Lymphatic   Palpation of lymph nodes in neck: Abnormal   bilateral anterior cervical node enlargement, but no posterior cervical node enlargement, no submandibular node enlargement and no supraclavicular node enlargement     Psychiatric   Orientation to person, place and time: Normal     Mood and affect: Normal        Signatures   Electronically signed by : IBETH Valentin; Nov 29 2017  2:22PM EST                       (Author)    Electronically signed by : Nohemy Briscoe DO; Nov 30 2017 10:39AM EST                       (Co-author)

## 2018-01-10 NOTE — CONSULTS
Chief Complaint  4 month cardiac follow up  No cardiac complaints  History of Present Illness  Followup for CAD    Doing well  No chest pain, no dyspnea, no palps  The patient states he has been stable with his coronary artery disease symptoms since the last visit  Symptoms: denies chest pain when at rest, denies exertional chest pain, denies dyspnea, denies fatigue and denies exercise intolerance  Associated symptoms include no syncope, no palpitations, no PND, no orthopnea and no edema  Review of Systems      Cardiac: No complaints of chest pain, no palpitations, no fainiting  Skin: No complaints of nonhealing sores or skin rash  Genitourinary: No complaints of recurrent urinary tract infections, frequent urination at night, difficult urination, blood in urine, kidney stones, loss of bladder control, no kidney or prostate problems, no erectile dysfunction  Psychological: No complaints of feeling depressed, anxiety, panic attacks, or difficulty concentrating  General: No complaints of trouble sleeping, lack of energy, fatigue, appetite changes, weight changes, fever, frequent infections, or night sweats  Respiratory: No complaints of shortness of breath, cough with sputum, or wheezing  HEENT: No complaints of serious problems, hearing problems, nose problems, throat problems, or snoring  Gastrointestinal: No complaints of liver problems, nausea, vomiting, heartburn, constipation, bloody stools, diarrhea, problems swallowing, adbominal pain, or rectal bleeding  Hematologic: No complaints of bleeding disorders, anemia, blood clots, or excessive brusing  Neurological: No complaints of numbness, tingling, dizziness, weakness, seizures, headaches, syncope or fainting, AM fatigue, daytime sleepiness, no witnessed apnea episodes  Musculoskeletal: No complaints of arthritis, back pain, or painfull swelling  ROS reviewed  Active Problems    1  Arm pain (729 5) (Q58 763)   2  Atherosclerosis (440 9) (I70 90)   3  Benign essential hypertension (401 1) (I10)   4  Coronary artery disease (414 00) (I25 10)   5  Dyslipidemia (272 4) (E78 5)   6  External hemorrhoid, thrombosed (455 4) (K64 5)   7  Fatigue (780 79) (R53 83)   8  Fatty liver (571 8) (K76 0)   9  Injury, forearm (959 3) (S59 919A)   10  Lipid screening (V77 91) (Z13 220)   11  Non-ST elevation (NSTEMI) myocardial infarction (410 70) (I21 4)   12  Obesity (278 00) (E66 9)   13  Plantar warts (078 12) (B07 0)   14  S/P drug eluting coronary stent placement (V45 82) (Z95 5)    Past Medical History    · History of X-Ray Ankle Fracture    The active problems and past medical history were reviewed and updated today  Surgical History    · History of Adenoidectomy   · History of Cath Stent Placement    The surgical history was reviewed and updated today  Family History    · Family history of Carcinoma Of The Tongue (V16 0)   · Family history of Diabetes Mellitus (V18 0)   · Family history of Hypertension (V17 49)    · Family history of Cancer    The family history was reviewed and updated today  Social History    · Being A Social Drinker   · Educational Level - Completed Bachelors Degree   · Marital History - Currently    · Never A Smoker   · Occupation:  The social history was reviewed and updated today  Current Meds   1  Aspirin 81 MG TABS; TAKE 1 TABLET DAILY; Therapy: (Recorded:05Apr2016) to Recorded   2  Atorvastatin Calcium 80 MG Oral Tablet; TAKE 1 TABLET DAILY  Requested for:   09MDQ2863; Last Rx:[id number] Ordered   3  Brilinta 90 MG Oral Tablet; TAKE 1 TABLET TWICE DAILY; Therapy: 21Apr2017 to (Evaluate:16Apr2018)  Requested for: 21Apr2017; Last   Rx:21Apr2017 Ordered   4  Carvedilol 12 5 MG Oral Tablet; TAKE 1 TABLET TWICE DAILY  Requested for:   16UGF6446; Last Rx:[id number] Ordered   5   Nitroglycerin 0 4 MG Sublingual Tablet Sublingual; DISSOLVE 1 TABLET UNDER THE   TONGUE AS NEEDED FOR CHEST PAIN  Requested for: 30KFS2929; Last   Rx:[id number] Ordered    The medication list was reviewed and updated today  Allergies    1  No Known Drug Allergies    Vitals   Recorded: 19Sep2017 04:01PM   Heart Rate 63, R Radial   Pulse Quality Normal, R Radial   Systolic 999, LUE, Sitting   Diastolic 86, LUE, Sitting   Height 6 ft 2 in   Weight 257 lb    BMI Calculated 33   BSA Calculated 2 42   O2 Saturation 97, RA     Physical Exam    Constitutional   General appearance: No acute distress, well appearing and well nourished  Eyes   Conjunctiva and Sclera examination: Conjunctiva pink, sclera anicteric  Ears, Nose, Mouth, and Throat - Oropharynx: Clear, nares are clear, mucous membranes are moist    Neck   Neck and thyroid: Normal, supple, trachea midline, no thyromegaly  Pulmonary   Respiratory effort: No increased work of breathing or signs of respiratory distress  Auscultation of lungs: Clear to auscultation, no rales, no rhonchi, no wheezing, good air movement  Cardiovascular   Auscultation of heart: Normal rate and rhythm, normal S1 and S2, no murmurs  Carotid pulses: Normal, 2+ bilaterally  Peripheral vascular exam: Normal pulses throughout, no tenderness, erythema or swelling  Pedal pulses: Normal, 2+ bilaterally  Examination of extremities for edema and/or varicosities: Normal     Abdomen   Abdomen: Non-tender and no distention  Liver and spleen: No hepatomegaly or splenomegaly  Musculoskeletal Gait and station: Normal gait  Digits and nails: Normal without clubbing or cyanosis  Inspection/palpation of joints, bones, and muscles: Normal, ROM normal     Skin - Skin and subcutaneous tissue: Normal without rashes or lesions  Skin is warm and well perfused, normal turgor  Neurologic - Cranial nerves: II - XII intact   Speech: Normal     Psychiatric - Orientation to person, place, and time: Normal  Mood and affect: Normal       Results/Data  I personally reviewed the recording/images in the office today  My interpretation follows  (1) LIPID PANEL, FASTING 24Apr2017 08:22AM Chelsea Keep     Test Name Result Flag Reference   CHOLESTEROL 110 mg/dL     HDL,DIRECT 51 mg/dL  40-60   Specimen collection should occur prior to Metamizole administration due to the potential for falsely depressed results  LDL CHOLESTEROL CALCULATED 46 mg/dL  0-100   Triglyceride:         Normal              <150 mg/dl       Borderline High    150-199 mg/dl       High               200-499 mg/dl       Very High          >499 mg/dl  Cholesterol:         Desirable        <200 mg/dl      Borderline High  200-239 mg/dl      High             >239 mg/dl  HDL Cholesterol:        High    >59 mg/dL      Low     <41 mg/dL  LDL CALCULATED:    This screening LDL is a calculated result  It does not have the accuracy of the Direct Measured LDL in the monitoring of patients with hyperlipidemia and/or statin therapy  Direct Measure LDL (VAJ527) must be ordered separately in these patients  TRIGLYCERIDES 66 mg/dL  <=150   Specimen collection should occur prior to N-Acetylcysteine or Metamizole administration due to the potential for falsely depressed results  Assessment    1  Coronary artery disease (414 00) (I25 10)   2  Benign essential hypertension (401 1) (I10)    Plan  Benign essential hypertension    · Carvedilol 12 5 MG Oral Tablet; TAKE 1 TABLET TWICE DAILY   Rx By: Khris Candeidra; Dispense: 30 Days ; #:60 Tablet; Refill: 5; For: Benign essential hypertension; HILARIO = N; Verified Transmission to [address] [place] [address]; Last Updated By: System, [employer]; 9/19/2017 4:02:04 PM  Coronary artery disease    · Brilinta 90 MG Oral Tablet; TAKE 1 TABLET TWICE DAILY   Rx By: Khris Funmilayo; Dispense: 30 Days ; #:60 Tablet;  Refill: 5; For: Coronary artery disease; HILARIO = N; Verified Transmission to [address] [place] [address]; Last Updated By: System, [employer]; 9/19/2017 4:02:04 PM  Dyslipidemia    · Atorvastatin Calcium 80 MG Oral Tablet; TAKE 1 TABLET DAILY   Rx By: Emily Burks; Dispense: 30 Days ; #:30 Tablet; Refill: 5; For: Dyslipidemia; HILARIO = N; Verified Transmission to [address] [place] [address]; Last Updated By: System, [employer]Script[employer]; 9/19/2017 4:02:03 PM    Discussion/Summary    1  Coronary Artery Disease: Hx of XIOMY to LAD and anterior MI  He also had PCI to the the OM and RCA  2  Dyslipidemia: Continue Atorvastatin at higher dose  Lipids have been stable  3  HTN: BP mildly high today  He will check at home  The patient was counseled regarding diagnostic results, instructions for management, risk factor reductions, impressions  total time of encounter was 25 minutes and 15 minutes was spent counseling        Signatures   Electronically signed by : JUANJOSE Bernal ; Sep 19 2017  4:25PM EST                       (Author)

## 2018-01-12 VITALS
HEART RATE: 57 BPM | HEIGHT: 74 IN | SYSTOLIC BLOOD PRESSURE: 118 MMHG | DIASTOLIC BLOOD PRESSURE: 74 MMHG | WEIGHT: 253.38 LBS | BODY MASS INDEX: 32.52 KG/M2

## 2018-01-12 NOTE — MISCELLANEOUS
Assessment    1  Atherosclerosis (440 9) (I70 90)   2  Benign essential hypertension (401 1) (I10)   3  Dyslipidemia (272 4) (E78 5)   4  Obesity (278 00) (E66 9)    Plan   Follow-up cardiology  He plans to work on the diet/exercise/weight loss program      Chief Complaint  Chief Complaint Free Text Note Form: MERLIN visit      History of Present Illness  TCM Communication [facility]: He was hospitalized at [facility]  The date of discharge: 3/30/2016  He was discharged to home  He scheduled a follow up appointment  Follow-up appointments with other specialists: Cardiologist  The patient is currently asymptomatic  Counseling was provided to the patient  Communication performed and completed by km   HPI: 3/28/16 he was admitted to [facility] with chest pain  He was found to have had a non-ST elevation MI  He had a cath and received a mid-LAD stent  He was discharged Wednesday  No chest pain, SOB, palpitations  He plans to return to work tomorrow but his cardiology appointment was rescheduled for then  He has started on a healthy diet  He will be starting cardiac rehab  His external hemorrhoid ruptured while in the hospital so he has not needed any more Anusol-HC  Active Problems    1  Benign essential hypertension (401 1) (I10)   2  Dyslipidemia (272 4) (E78 5)   3  External hemorrhoid, thrombosed (455 4) (K64 5)   4  Fatigue (780 79) (R53 83)   5  Fatty liver (571 8) (K76 0)   6  Lipid screening (V77 91) (Z13 220)   7  Non-ST elevation (NSTEMI) myocardial infarction (410 70) (I21 4)   8  Obesity (278 00) (E66 9)   9  Plantar warts (078 12) (B07 0)    Past Medical History    1  History of X-Ray Ankle Fracture    Surgical History    1  History of Adenoidectomy   2  History of Cath Stent Placement    Family History    1  Family history of Carcinoma Of The Tongue (V16 0)   2  Family history of Diabetes Mellitus (V18 0)   3  Family history of Hypertension (V17 49)    4   Family history of Cancer    Social History    · Being A Social Drinker   · Educational Level - Completed Bachelors Degree   · Marital History - Currently    · Never A Smoker   · Occupation:    Allergies    1  No Known Drug Allergies    Vitals  Signs [Data Includes: Current Encounter]   Recorded: [id number] 11:24AM   Temperature: 98 1 F  Heart Rate: 82  Respiration: 16  Systolic: 074  Diastolic: 84  Height: 6 ft 2 in  Weight: 289 lb 0 16 oz  BMI Calculated: 37 11  BSA Calculated: 2 54  O2 Saturation: 98    Physical Exam    Constitutional   General appearance: No acute distress, well appearing and well nourished  obese  Pulmonary   Respiratory effort: No increased work of breathing or signs of respiratory distress  Auscultation of lungs: Clear to auscultation, equal breath sounds bilaterally, no wheezes, no rales, no rhonci  Cardiovascular   Auscultation of heart: Normal rate and rhythm, normal S1 and S2, without murmurs  Examination of extremities for edema and/or varicosities: Normal     Carotid pulses: Normal     Abdomen   Abdomen: Non-tender, no masses  Liver and spleen: No hepatomegaly or splenomegaly  Lymphatic   Palpation of lymph nodes in neck: No lymphadenopathy  Musculoskeletal   Gait and station: Normal     Psychiatric   Orientation to person, place and time: Normal     Mood and affect: Normal          Future Appointments    Date/Time Provider Specialty Site   05/19/2016 10:00 AM JUANJOSE Real   Cardiology [facility] CARDIOLOGY [facility]   04/06/2016 09:20 AM Junior Gaviria Cardiology [facility] CARDIOLOGY [facility]     Signatures   Electronically signed by : JUANJOSE Garcia ; Apr 5 2016 11:56AM EST                       (Author)

## 2018-01-13 VITALS
OXYGEN SATURATION: 97 % | DIASTOLIC BLOOD PRESSURE: 86 MMHG | BODY MASS INDEX: 32.98 KG/M2 | WEIGHT: 257 LBS | HEIGHT: 74 IN | HEART RATE: 63 BPM | SYSTOLIC BLOOD PRESSURE: 140 MMHG

## 2018-01-13 VITALS
SYSTOLIC BLOOD PRESSURE: 124 MMHG | HEIGHT: 74 IN | DIASTOLIC BLOOD PRESSURE: 80 MMHG | BODY MASS INDEX: 32.8 KG/M2 | WEIGHT: 255.56 LBS | HEART RATE: 67 BPM

## 2018-01-13 VITALS
HEART RATE: 60 BPM | WEIGHT: 259 LBS | HEIGHT: 74 IN | SYSTOLIC BLOOD PRESSURE: 132 MMHG | DIASTOLIC BLOOD PRESSURE: 88 MMHG | BODY MASS INDEX: 33.24 KG/M2

## 2018-01-13 NOTE — MISCELLANEOUS
Message  Return to work or school:    He is able to return to work on  4/22/2016    He is able to perform activities of daily living without limitations  Weight Bearing Status: Full Weight-Bearing           Signatures   Electronically signed by : JUANJOSE Eddy ; Apr 25 2016 10:21AM EST                       (Author)

## 2018-01-14 VITALS
SYSTOLIC BLOOD PRESSURE: 120 MMHG | WEIGHT: 259.19 LBS | HEART RATE: 67 BPM | BODY MASS INDEX: 33.26 KG/M2 | DIASTOLIC BLOOD PRESSURE: 76 MMHG | HEIGHT: 74 IN

## 2018-01-15 NOTE — RESULT NOTES
Verified Results  (Q) CBC (INCLUDES DIFF/PLT) WITH SMEAR REVIEW [id number] 09:21AM Miguelito King     Test Name Result Flag Reference   WHITE BLOOD CELL COUNT 7 8 Thousand/uL  3 8-10 8   RED BLOOD CELL COUNT 5 29 Million/uL  4 20-5 80   HEMOGLOBIN 15 6 g/dL  13 2-17 1   HEMATOCRIT 48 7 %  38 5-50 0   MCV 92 0 fL  80 0-100 0   MCH 29 6 pg  27 0-33 0   MCHC 32 1 g/dL  32 0-36 0   RDW 13 5 %  11 0-15 0   PLATELET COUNT 004 Thousand/uL  140-400   MPV 9 8 fL  7 5-11 5   ABSOLUTE NEUTROPHILS 6474 cells/uL  8200-4463   ABSOLUTE LYMPHOCYTES 1170 cells/uL  850-3900   ABSOLUTE MONOCYTES 156 cells/uL L 200-950   ABSOLUTE EOSINOPHILS 0 cells/uL L    ABSOLUTE BASOPHILS 0 cells/uL  0-200   NEUTROPHILS 83 %     LYMPHOCYTES 15 %     MONOCYTES 2 %     EOSINOPHILS 0 %     BASOPHILS 0 %     CBC MORPHOLOGY   NORMAL   Red cell morphology appears unremarkable     (1) COMPREHENSIVE METABOLIC PANEL [id number] 54:36HC Miguelito King     Test Name Result Flag Reference   GLUCOSE 101 mg/dL H 65-99   Fasting reference interval   UREA NITROGEN (BUN) 13 mg/dL  7-25   CREATININE 1 10 mg/dL  0 60-1 35   eGFR NON-AFR   AMERICAN 78 mL/min/1 73m2  > OR = 60   eGFR AFRICAN AMERICAN 91 mL/min/1 73m2  > OR = 60   BUN/CREATININE RATIO   9-10   NOT APPLICABLE (calc)   SODIUM 137 mmol/L  135-146   POTASSIUM 4 3 mmol/L  3 5-5 3   CHLORIDE 102 mmol/L     CARBON DIOXIDE 25 mmol/L  19-30   CALCIUM 9 2 mg/dL  8 6-10 3   PROTEIN, TOTAL 7 3 g/dL  6 1-8 1   ALBUMIN 4 5 g/dL  3 6-5 1   GLOBULIN 2 8 g/dL (calc)  1 9-3 7   ALBUMIN/GLOBULIN RATIO 1 6 (calc)  1 0-2 5   BILIRUBIN, TOTAL 0 6 mg/dL  0 2-1 2   ALKALINE PHOSPHATASE 68 U/L     AST 19 U/L  10-40   ALT 25 U/L  9-46     (Q) LIPID PANEL WITH REFLEX TO DIRECT LDL [id number] 09:21AM Miguelito King     Test Name Result Flag Reference   CHOLESTEROL, TOTAL 203 mg/dL H 125-200   HDL CHOLESTEROL 45 mg/dL  > OR = 40   TRIGLICERIDES 99 mg/dL  <308   LDL-CHOLESTEROL 138 mg/dL (calc) H <130 Desirable range <100 mg/dL for patients with CHD or  diabetes and <70 mg/dL for diabetic patients with  known heart disease  CHOL/HDLC RATIO 4 5 (calc)  < OR = 5 0   NON HDL CHOLESTEROL 158 mg/dL (calc)     Target for non-HDL cholesterol is 30 mg/dL higher than   LDL cholesterol target  (Q) HEMOGLOBIN A1c WITH eAG [id number] 09:21AM Marylou Colón     Test Name Result Flag Reference   HEMOGLOBIN A1c 5 6 % of total Hgb  <5 7   According to ADA guidelines, hemoglobin A1c <7 0%  represents optimal control in non-pregnant diabetic  patients  Different metrics may apply to specific  patient populations  Standards of Medical Care in    Diabetes Care  2013;36:s11-s66     For the purpose of screening for the presence of  diabetes  <5 7%       Consistent with the absence of diabetes  5 7-6 4%    Consistent with increased risk for diabetes              (prediabetes)  >or=6 5%    Consistent with diabetes     This assay result is consistent with a decreased risk  of diabetes  Currently, no consensus exists for use of hemoglobin  A1c for diagnosis of diabetes for children  eAG (mg/dL) 114 (calc)     eAG (mmol/L) 6 3 (calc)       (Q) TSH, 3RD GENERATION W/REFLEX TO FT4 [id number] 09:21AM Marylou Colón     Test Name Result Flag Reference   TSH W/REFLEX TO FT4 1 85 mIU/L  0 40-4 50     (Q) URINALYSIS REFLEX [id number] 09:21AM Marylou Colón   REPORT COMMENT:  FASTING:YES     Test Name Result Flag Reference   COLOR YELLOW  YELLOW   APPEARANCE CLEAR  CLEAR   SPECIFIC GRAVITY 1 012  1 001-1 035   PH 5 0  5 0-8 0   GLUCOSE NEGATIVE  NEGATIVE   BILIRUBIN NEGATIVE  NEGATIVE   KETONES NEGATIVE  NEGATIVE   OCCULT BLOOD NEGATIVE  NEGATIVE   PROTEIN NEGATIVE  NEGATIVE   NITRITE NEGATIVE  NEGATIVE   LEUKOCYTE ESTERASE NEGATIVE  NEGATIVE       Discussion/Summary   The cholesterol could be a bit lower but everything else looks OK   We'll discuss at your physical  Dr Osmel Solis

## 2018-02-16 DIAGNOSIS — I25.10 CAD (CORONARY ARTERY DISEASE): Primary | ICD-10-CM

## 2018-02-21 RX ORDER — TICAGRELOR 90 MG/1
TABLET ORAL
Qty: 180 TABLET | Refills: 0 | Status: SHIPPED | OUTPATIENT
Start: 2018-02-21 | End: 2018-03-15 | Stop reason: SDUPTHER

## 2018-03-15 ENCOUNTER — OFFICE VISIT (OUTPATIENT)
Dept: CARDIOLOGY CLINIC | Facility: CLINIC | Age: 53
End: 2018-03-15
Payer: COMMERCIAL

## 2018-03-15 VITALS
OXYGEN SATURATION: 97 % | HEART RATE: 58 BPM | DIASTOLIC BLOOD PRESSURE: 80 MMHG | WEIGHT: 264.6 LBS | BODY MASS INDEX: 33.96 KG/M2 | HEIGHT: 74 IN | SYSTOLIC BLOOD PRESSURE: 120 MMHG

## 2018-03-15 DIAGNOSIS — I21.4 NSTEMI (NON-ST ELEVATED MYOCARDIAL INFARCTION) (HCC): ICD-10-CM

## 2018-03-15 DIAGNOSIS — I10 HYPERTENSION, UNSPECIFIED TYPE: Primary | ICD-10-CM

## 2018-03-15 PROCEDURE — 93000 ELECTROCARDIOGRAM COMPLETE: CPT | Performed by: INTERNAL MEDICINE

## 2018-03-15 PROCEDURE — 99214 OFFICE O/P EST MOD 30 MIN: CPT | Performed by: INTERNAL MEDICINE

## 2018-03-15 NOTE — PROGRESS NOTES
Cardiology Follow Up    Sujey Garcia  1965  [id number]  [address] [facility] [address]    1  Hypertension, unspecified type  POCT ECG   2  NSTEMI (non-ST elevated myocardial infarction) ([facility] [address] )  POCT ECG       Interval History:     Followup for CAD  No chest pain, no dyspnea, no palpitations       Problem List     Chest pain    Hypertension    NSTEMI (non-ST elevated myocardial infarction) ([facility] [address] )        Past Medical History:   Diagnosis Date    CAD (coronary artery disease)     Dyslipidemia     Hyperlipidemia     Hypertension     Obesity      Social History     Social History    Marital status: /Civil Union     Spouse name: N/A    Number of children: N/A    Years of education: N/A     Occupational History          Social History Main Topics    Smoking status: Never Smoker    Smokeless tobacco: Never Used    Alcohol use Yes      Comment: 2 per week    Drug use: No    Sexual activity: Not on file     Other Topics Concern    Not on file     Social History Narrative    No narrative on file      Family History   Problem Relation Age of Onset    Diabetes Mother      on the mother's side     Past Surgical History:   Procedure Laterality Date    ADENOIDECTOMY      CORONARY ANGIOPLASTY WITH STENT PLACEMENT      CORONARY ARTERY BYPASS GRAFT         Current Outpatient Prescriptions:     carvedilol (COREG) 12 5 mg tablet, Take by mouth , Disp: , Rfl:     nitroglycerin (NITROSTAT) 0 4 mg SL tablet, Place under the tongue , Disp: , Rfl:     ticagrelor (BRILINTA) 90 MG, Take by mouth , Disp: , Rfl:     aspirin 81 mg chewable tablet, Chew 1 tablet daily for 30 days, Disp: 30 tablet, Rfl: 0    atorvastatin (LIPITOR) 80 mg tablet, Take 1 tablet by mouth daily with dinner for 30 days, Disp: 30 tablet, Rfl: 0  No Known Allergies    Labs:     Chemistry        Component Value Date/Time     05/02/2017 0803     05/02/2017 0803    K 4 6 05/02/2017 0803    K 4 6 05/02/2017 0803     05/02/2017 0803     05/02/2017 0803    CO2 26 05/02/2017 0803    CO2 26 05/02/2017 0803    BUN 17 05/02/2017 0803    BUN 17 05/02/2017 0803    CREATININE 1 00 05/02/2017 0803    CREATININE 1 00 05/02/2017 0803        Component Value Date/Time    CALCIUM 8 9 05/02/2017 0803    CALCIUM 8 9 05/02/2017 0803    ALKPHOS 79 04/22/2017 0726    AST 20 04/22/2017 0726    ALT 24 04/22/2017 0726    BILITOT 0 9 04/22/2017 0726            Lab Results   Component Value Date    CHOL 110 04/24/2017    CHOL 124 (L) 01/26/2017    CHOL 186 03/29/2016     Lab Results   Component Value Date    HDL 51 04/24/2017    HDL 47 01/26/2017    HDL 48 03/29/2016     Lab Results   Component Value Date    LDLCALC 46 04/24/2017    LDLCALC 115 (H) 03/29/2016     Lab Results   Component Value Date    TRIG 66 04/24/2017    TRIG 103 01/26/2017    TRIG 115 03/29/2016     No components found for: CHOLHDL    Imaging: No results found  ROS    Vitals:    03/15/18 1000   BP: 120/80   Pulse: 58   SpO2: 97%           Physical Exam   Constitutional: He is oriented to person, place, and time  No distress  HENT:   Mouth/Throat: No oropharyngeal exudate  Eyes: No scleral icterus  Neck: No JVD present  Cardiovascular: Normal rate and regular rhythm  No murmur heard  Pulmonary/Chest: Effort normal and breath sounds normal  No respiratory distress  He has no wheezes  He has no rales  Abdominal: Soft  Bowel sounds are normal  He exhibits no distension  There is no tenderness  There is no rebound  Neurological: He is alert and oriented to person, place, and time  Skin: Skin is warm  He is not diaphoretic  Psychiatric: He has a normal mood and affect  His behavior is normal        Discussion/Summary:    Coronary Artery Disease: Continue with dual antiplatelet therapy for the time being  Continue atorvastatin   Check lipids  Discussed importance of healthy eating      The patient was counseled regarding diagnostic results, instructions for management, risk factor reductions, impressions  total time of encounter was 25 minutes and 15 minutes was spent counseling

## 2018-04-29 DIAGNOSIS — I25.10 CORONARY ARTERY DISEASE INVOLVING NATIVE CORONARY ARTERY OF NATIVE HEART WITHOUT ANGINA PECTORIS: Primary | ICD-10-CM

## 2018-04-30 RX ORDER — TICAGRELOR 90 MG/1
TABLET ORAL
Qty: 180 TABLET | Refills: 0 | Status: SHIPPED | OUTPATIENT
Start: 2018-04-30 | End: 2018-07-29 | Stop reason: SDUPTHER

## 2018-06-19 LAB
ALBUMIN SERPL-MCNC: 4.4 G/DL (ref 3.6–5.1)
ALBUMIN/GLOB SERPL: 1.6 (CALC) (ref 1–2.5)
ALP SERPL-CCNC: 91 U/L (ref 40–115)
ALT SERPL-CCNC: 20 U/L (ref 9–46)
AST SERPL-CCNC: 19 U/L (ref 10–35)
BILIRUB DIRECT SERPL-MCNC: 0.2 MG/DL
BILIRUB INDIRECT SERPL-MCNC: 0.6 MG/DL (CALC) (ref 0.2–1.2)
BILIRUB SERPL-MCNC: 0.8 MG/DL (ref 0.2–1.2)
CHOLEST SERPL-MCNC: 124 MG/DL
CHOLEST/HDLC SERPL: 2.5 (CALC)
GLOBULIN SER CALC-MCNC: 2.7 G/DL (CALC) (ref 1.9–3.7)
HDLC SERPL-MCNC: 49 MG/DL
LDLC SERPL CALC-MCNC: 57 MG/DL (CALC)
LDLC SERPL DIRECT ASSAY-MCNC: 63 MG/DL
NONHDLC SERPL-MCNC: 75 MG/DL (CALC)
PROT SERPL-MCNC: 7.1 G/DL (ref 6.1–8.1)
TRIGL SERPL-MCNC: 93 MG/DL

## 2018-07-25 DIAGNOSIS — E78.5 DYSLIPIDEMIA: Primary | ICD-10-CM

## 2018-07-25 RX ORDER — ATORVASTATIN CALCIUM 80 MG/1
TABLET, FILM COATED ORAL
Qty: 90 TABLET | Refills: 2 | Status: SHIPPED | OUTPATIENT
Start: 2018-07-25 | End: 2019-04-22 | Stop reason: SDUPTHER

## 2018-07-29 DIAGNOSIS — I25.10 CORONARY ARTERY DISEASE INVOLVING NATIVE CORONARY ARTERY OF NATIVE HEART WITHOUT ANGINA PECTORIS: ICD-10-CM

## 2018-07-30 RX ORDER — TICAGRELOR 90 MG/1
TABLET ORAL
Qty: 180 TABLET | Refills: 0 | Status: SHIPPED | OUTPATIENT
Start: 2018-07-30 | End: 2018-09-11 | Stop reason: ALTCHOICE

## 2018-09-11 ENCOUNTER — OFFICE VISIT (OUTPATIENT)
Dept: CARDIOLOGY CLINIC | Facility: CLINIC | Age: 53
End: 2018-09-11
Payer: COMMERCIAL

## 2018-09-11 VITALS
SYSTOLIC BLOOD PRESSURE: 122 MMHG | BODY MASS INDEX: 34.77 KG/M2 | HEIGHT: 74 IN | HEART RATE: 67 BPM | WEIGHT: 270.9 LBS | DIASTOLIC BLOOD PRESSURE: 68 MMHG | OXYGEN SATURATION: 97 %

## 2018-09-11 DIAGNOSIS — I25.10 CORONARY ARTERY DISEASE INVOLVING NATIVE CORONARY ARTERY OF NATIVE HEART WITHOUT ANGINA PECTORIS: Primary | ICD-10-CM

## 2018-09-11 PROCEDURE — 99214 OFFICE O/P EST MOD 30 MIN: CPT | Performed by: INTERNAL MEDICINE

## 2018-09-11 RX ORDER — CLOPIDOGREL BISULFATE 75 MG/1
75 TABLET ORAL DAILY
Qty: 90 TABLET | Refills: 3 | Status: SHIPPED | OUTPATIENT
Start: 2018-09-11 | End: 2019-10-14 | Stop reason: ALTCHOICE

## 2018-09-11 NOTE — PROGRESS NOTES
Cardiology Follow Up    Luciano Barahona  1965  [id number]  [address] [facility] [place] [id number] [phone]    No diagnosis found  Interval History: Followup for CAD    Doing well  No chest pain, no dyspnea and no palpitations  Tolerating medical therapy  Problem List     Chest pain    Hypertension    NSTEMI (non-ST elevated myocardial infarction) [facility])        Past Medical History:   Diagnosis Date    CAD (coronary artery disease)     Dyslipidemia     Hyperlipidemia     Hypertension     Obesity      Social History     Social History    Marital status: /Civil Union     Spouse name: N/A    Number of children: N/A    Years of education: N/A     Occupational History          Social History Main Topics    Smoking status: Never Smoker    Smokeless tobacco: Never Used    Alcohol use Yes      Comment: 2 per week    Drug use: No    Sexual activity: Not on file     Other Topics Concern    Not on file     Social History Narrative    No narrative on file      Family History   Problem Relation Age of Onset    Diabetes Mother         on the mother's side    Heart failure Maternal Grandmother     Heart attack Maternal Grandfather     Hypertension Family     Hyperlipidemia Neg Hx     Anuerysm Neg Hx     Arrhythmia Neg Hx     Sudden death Neg Hx     Stroke Neg Hx     Clotting disorder Neg Hx      Past Surgical History:   Procedure Laterality Date    ADENOIDECTOMY      CORONARY ANGIOPLASTY WITH STENT PLACEMENT      CORONARY ARTERY BYPASS GRAFT         Current Outpatient Prescriptions:     atorvastatin (LIPITOR) 80 mg tablet, TAKE 1 TABLET DAILY, Disp: 90 tablet, Rfl: 2    BRILINTA 90 MG, TAKE 1 TABLET TWICE A DAY, Disp: 180 tablet, Rfl: 0    carvedilol (COREG) 12 5 mg tablet, Take by mouth , Disp: , Rfl:     nitroglycerin (NITROSTAT) 0 4 mg SL tablet, Place under the tongue  , Disp: , Rfl:   No Known Allergies    Labs:     Chemistry        Component Value Date/Time     05/02/2017 0803     05/02/2017 0803    K 4 6 05/02/2017 0803    K 4 6 05/02/2017 0803     05/02/2017 0803     05/02/2017 0803    CO2 26 05/02/2017 0803    CO2 26 05/02/2017 0803    BUN 17 05/02/2017 0803    BUN 17 05/02/2017 0803    CREATININE 1 00 05/02/2017 0803    CREATININE 1 00 05/02/2017 0803        Component Value Date/Time    CALCIUM 8 9 05/02/2017 0803    CALCIUM 8 9 05/02/2017 0803    ALKPHOS 91 06/18/2018 0820    AST 20 04/22/2017 0726    ALT 24 04/22/2017 0726    BILITOT 0 9 04/22/2017 0726            Lab Results   Component Value Date    CHOL 124 (L) 01/26/2017    CHOL 203 (H) 03/24/2016     Lab Results   Component Value Date    HDL 49 06/18/2018    HDL 51 04/24/2017    HDL 47 01/26/2017     Lab Results   Component Value Date    LDLCALC 46 04/24/2017    LDLCALC 115 (H) 03/29/2016     Lab Results   Component Value Date    TRIG 93 06/18/2018    TRIG 66 04/24/2017    TRIG 103 01/26/2017     No components found for: CHOLHDL    Imaging: No results found  Review of Systems   Constitution: Negative  HENT: Negative  Eyes: Negative  Cardiovascular: Negative  Respiratory: Negative  Endocrine: Negative  Hematologic/Lymphatic: Negative  Skin: Negative  Musculoskeletal: Negative  Gastrointestinal: Negative  Genitourinary: Negative  Neurological: Negative  Psychiatric/Behavioral: Negative  Allergic/Immunologic: Negative  Vitals:    09/11/18 1002   BP: 122/68   Pulse: 67   SpO2: 97%           Physical Exam   Constitutional: He is oriented to person, place, and time  No distress  HENT:   Mouth/Throat: No oropharyngeal exudate  Eyes: No scleral icterus  Neck: No JVD present  Cardiovascular: Normal rate and regular rhythm  No murmur heard  Pulmonary/Chest: Effort normal and breath sounds normal  No respiratory distress  He has no wheezes   He has no rales  Abdominal: Soft  Bowel sounds are normal  He exhibits no distension  There is no tenderness  There is no rebound  Musculoskeletal: He exhibits no edema  Neurological: He is alert and oriented to person, place, and time  Skin: Skin is warm and dry  He is not diaphoretic  Psychiatric: He has a normal mood and affect  Discussion/Summary:    Coronary Artery Disease: History of PCI to the LAD and Distal RCA  His LDL is stable  He had an acute MI one month coming off brilinta previously  Will transition from brilinta to clopidogrel for his single antiplatelet agent  The patient was counseled regarding diagnostic results, instructions for management, risk factor reductions, impressions  total time of encounter was 25 minutes and 15 minutes was spent counseling

## 2018-10-23 DIAGNOSIS — I25.10 CAD (CORONARY ARTERY DISEASE): Primary | ICD-10-CM

## 2018-10-23 RX ORDER — CARVEDILOL 12.5 MG/1
TABLET ORAL
Qty: 180 TABLET | Refills: 3 | Status: SHIPPED | OUTPATIENT
Start: 2018-10-23 | End: 2019-10-20 | Stop reason: SDUPTHER

## 2018-12-11 ENCOUNTER — OFFICE VISIT (OUTPATIENT)
Dept: FAMILY MEDICINE CLINIC | Facility: OTHER | Age: 53
End: 2018-12-11
Payer: COMMERCIAL

## 2018-12-11 VITALS
OXYGEN SATURATION: 97 % | SYSTOLIC BLOOD PRESSURE: 142 MMHG | TEMPERATURE: 98.1 F | HEART RATE: 66 BPM | DIASTOLIC BLOOD PRESSURE: 90 MMHG | HEIGHT: 74 IN | BODY MASS INDEX: 36.08 KG/M2 | WEIGHT: 281.13 LBS

## 2018-12-11 DIAGNOSIS — Z23 ENCOUNTER FOR IMMUNIZATION: ICD-10-CM

## 2018-12-11 DIAGNOSIS — Z12.11 SCREENING FOR COLON CANCER: Primary | ICD-10-CM

## 2018-12-11 DIAGNOSIS — Z12.5 SCREENING FOR PROSTATE CANCER: ICD-10-CM

## 2018-12-11 DIAGNOSIS — R10.9 RIGHT SIDED ABDOMINAL PAIN: ICD-10-CM

## 2018-12-11 PROBLEM — I21.11 ACUTE ST ELEVATION MYOCARDIAL INFARCTION (STEMI) INVOLVING RIGHT CORONARY ARTERY (HCC): Status: ACTIVE | Noted: 2017-06-05

## 2018-12-11 PROBLEM — S59.919A: Status: ACTIVE | Noted: 2017-01-09

## 2018-12-11 PROBLEM — I25.10 CORONARY ARTERY DISEASE INVOLVING NATIVE CORONARY ARTERY OF NATIVE HEART WITHOUT ANGINA PECTORIS: Status: ACTIVE | Noted: 2017-06-05

## 2018-12-11 PROBLEM — J06.9 URTI (ACUTE UPPER RESPIRATORY INFECTION): Status: RESOLVED | Noted: 2017-11-29 | Resolved: 2018-12-11

## 2018-12-11 PROBLEM — J06.9 URTI (ACUTE UPPER RESPIRATORY INFECTION): Status: ACTIVE | Noted: 2017-11-29

## 2018-12-11 PROBLEM — M79.603 ARM PAIN: Status: ACTIVE | Noted: 2017-01-09

## 2018-12-11 PROBLEM — E78.9 LIPID DISORDER: Status: ACTIVE | Noted: 2017-05-31

## 2018-12-11 PROCEDURE — 3008F BODY MASS INDEX DOCD: CPT | Performed by: FAMILY MEDICINE

## 2018-12-11 PROCEDURE — 90682 RIV4 VACC RECOMBINANT DNA IM: CPT

## 2018-12-11 PROCEDURE — 90471 IMMUNIZATION ADMIN: CPT

## 2018-12-11 PROCEDURE — 90732 PPSV23 VACC 2 YRS+ SUBQ/IM: CPT

## 2018-12-11 PROCEDURE — 90472 IMMUNIZATION ADMIN EACH ADD: CPT

## 2018-12-11 PROCEDURE — 99214 OFFICE O/P EST MOD 30 MIN: CPT | Performed by: FAMILY MEDICINE

## 2018-12-11 PROCEDURE — 1036F TOBACCO NON-USER: CPT | Performed by: FAMILY MEDICINE

## 2018-12-11 NOTE — PATIENT INSTRUCTIONS
Abdominal Pain   AMBULATORY CARE:   Abdominal pain  can be dull, achy, or sharp  You may have pain in one area of your abdomen, or in your entire abdomen  Your pain may be caused by a condition such as constipation, food sensitivity or poisoning, infection, or a blockage  Abdominal pain can also be from a hernia, appendicitis, or an ulcer  Liver, gallbladder, or kidney conditions can also cause abdominal pain  The cause of your abdominal pain may be unknown  Seek care immediately if:   · You have new chest pain or shortness of breath  · You have pulsing pain in your upper abdomen or lower back that suddenly becomes constant  · Your pain is in the right lower abdominal area and worsens with movement  · You have a fever over 100 4°F (38°C) or shaking chills  · You are vomiting and cannot keep food or liquids down  · Your pain does not improve or gets worse over the next 8 to 12 hours  · You see blood in your vomit or bowel movements, or they look black and tarry  · Your skin or the whites of your eyes turn yellow  · You are a woman and have a large amount of vaginal bleeding that is not your monthly period  Contact your healthcare provider if:   · You have pain in your lower back  · You are a man and have pain in your testicles  · You have pain when you urinate  · You have questions or concerns about your condition or care  Treatment for abdominal pain  may include medicine to calm your stomach, prevent vomiting, or decrease pain  Follow up with your healthcare provider as directed:  Write down your questions so you remember to ask them during your visits  © 2017 [employer]  Information is for End User's use only and may not be sold, redistributed or otherwise used for commercial purposes  All illustrations and images included in CareNotes® are the copyrighted property of [employer]  or [employer] Peral[employer]    The above information is an educational aid only  It is not intended as medical advice for individual conditions or treatments  Talk to your doctor, nurse or pharmacist before following any medical regimen to see if it is safe and effective for you

## 2018-12-11 NOTE — PROGRESS NOTES
Assessment/Plan:           Problem List Items Addressed This Visit     None      Visit Diagnoses     Screening for colon cancer    -  Primary    Relevant Orders    Ambulatory referral to Gastroenterology    Right sided abdominal pain        Relevant Orders    CBC and differential    Comprehensive metabolic panel    TSH, 3rd generation with Free T4 reflex    US abdomen limited    Screening for prostate cancer        Relevant Orders    PSA, total and free    Encounter for immunization        Relevant Orders    PREFERRED: influenza vaccine, 2372-6996, quadrivalent, recombinant, PF, 0 5 mL, for patients 18 yr+ (FLUBLOK) (Completed)    Pneumococcal Polysaccharide Vaccine 23-Valent =>1yo SQ IM (Completed)            Subjective:      Patient ID: Rupali Smith is a 48 y o  male  Abdominal Pain   This is a new problem  The current episode started more than 1 month ago  The onset quality is gradual  The problem occurs intermittently  The problem has been unchanged  The pain is located in the RUQ  The pain is mild  The quality of the pain is cramping and dull  The abdominal pain does not radiate  Pertinent negatives include no anorexia, belching, constipation, diarrhea, dysuria, fever, headaches, hematochezia, myalgias, nausea or vomiting  The pain is aggravated by movement (bending )  The pain is relieved by nothing  He has tried nothing for the symptoms  The treatment provided no relief  There is no history of abdominal surgery or gallstones  The following portions of the patient's history were reviewed and updated as appropriate: allergies, current medications, past family history, past medical history, past social history, past surgical history and problem list     Review of Systems   Constitutional: Negative for appetite change, chills, fatigue and fever  Respiratory: Negative for cough, shortness of breath and wheezing  Cardiovascular: Negative for chest pain and leg swelling     Gastrointestinal: Positive for abdominal pain  Negative for anorexia, constipation, diarrhea, hematochezia, nausea and vomiting  Genitourinary: Negative for dysuria and flank pain  Musculoskeletal: Negative for myalgias  Skin: Negative for pallor and rash  Neurological: Negative for tremors, weakness and headaches  Objective:      /90 (BP Location: Left arm, Patient Position: Sitting, Cuff Size: Large)   Pulse 66   Temp 98 1 °F (36 7 °C) (Tympanic)   Ht 6' 2" (1 88 m)   Wt 128 kg (281 lb 2 oz)   SpO2 97%   BMI 36 09 kg/m²          Physical Exam   Constitutional: He is oriented to person, place, and time  He appears well-developed and well-nourished  No distress  HENT:   Head: Normocephalic and atraumatic  Eyes: Right eye exhibits no discharge  Left eye exhibits no discharge  No scleral icterus  Neck: Normal range of motion  Neck supple  Cardiovascular: Normal rate, regular rhythm, normal heart sounds and intact distal pulses  No murmur heard  Pulmonary/Chest: Effort normal and breath sounds normal  No respiratory distress  He has no wheezes  Abdominal: Soft  Bowel sounds are normal  He exhibits no distension  There is tenderness  Mild tenderness at the RUQ with deep palpation  No rebound tenderness or guarding  Lymphadenopathy:     He has no cervical adenopathy  Neurological: He is alert and oriented to person, place, and time  Skin: Skin is warm and dry  No rash noted  He is not diaphoretic  No erythema  No pallor  Psychiatric: He has a normal mood and affect  His behavior is normal    Nursing note and vitals reviewed        Lab Results   Component Value Date    WBC 6 6 05/02/2017    HGB 14 1 05/02/2017    HCT 41 7 05/02/2017     05/02/2017    CHOL 124 (L) 01/26/2017    TRIG 93 06/18/2018    HDL 49 06/18/2018    ALT 24 04/22/2017    AST 20 04/22/2017     05/02/2017     05/02/2017    K 4 6 05/02/2017    K 4 6 05/02/2017     05/02/2017     05/02/2017 CREATININE 1 00 05/02/2017    CREATININE 1 00 05/02/2017    BUN 17 05/02/2017    BUN 17 05/02/2017    CO2 26 05/02/2017    CO2 26 05/02/2017    INR 1 07 04/24/2017    GLUF 102 (H) 04/24/2017    HGBA1C 5 1 03/30/2016

## 2018-12-28 ENCOUNTER — TELEPHONE (OUTPATIENT)
Dept: FAMILY MEDICINE CLINIC | Facility: OTHER | Age: 53
End: 2018-12-28

## 2018-12-28 LAB
ALBUMIN SERPL-MCNC: 4.3 G/DL (ref 3.6–5.1)
ALBUMIN/GLOB SERPL: 1.7 (CALC) (ref 1–2.5)
ALP SERPL-CCNC: 80 U/L (ref 40–115)
ALT SERPL-CCNC: 25 U/L (ref 9–46)
AST SERPL-CCNC: 23 U/L (ref 10–35)
BASOPHILS # BLD AUTO: 39 CELLS/UL (ref 0–200)
BASOPHILS NFR BLD AUTO: 0.6 %
BILIRUB SERPL-MCNC: 0.9 MG/DL (ref 0.2–1.2)
BUN SERPL-MCNC: 16 MG/DL (ref 7–25)
BUN/CREAT SERPL: NORMAL (CALC) (ref 6–22)
CALCIUM SERPL-MCNC: 9.1 MG/DL (ref 8.6–10.3)
CHLORIDE SERPL-SCNC: 103 MMOL/L (ref 98–110)
CO2 SERPL-SCNC: 27 MMOL/L (ref 20–32)
CREAT SERPL-MCNC: 0.98 MG/DL (ref 0.7–1.33)
EOSINOPHIL # BLD AUTO: 189 CELLS/UL (ref 15–500)
EOSINOPHIL NFR BLD AUTO: 2.9 %
ERYTHROCYTE [DISTWIDTH] IN BLOOD BY AUTOMATED COUNT: 11.8 % (ref 11–15)
GLOBULIN SER CALC-MCNC: 2.6 G/DL (CALC) (ref 1.9–3.7)
GLUCOSE SERPL-MCNC: 98 MG/DL (ref 65–99)
HCT VFR BLD AUTO: 43.5 % (ref 38.5–50)
HGB BLD-MCNC: 15.2 G/DL (ref 13.2–17.1)
LYMPHOCYTES # BLD AUTO: 1307 CELLS/UL (ref 850–3900)
LYMPHOCYTES NFR BLD AUTO: 20.1 %
MCH RBC QN AUTO: 31.9 PG (ref 27–33)
MCHC RBC AUTO-ENTMCNC: 34.9 G/DL (ref 32–36)
MCV RBC AUTO: 91.2 FL (ref 80–100)
MONOCYTES # BLD AUTO: 442 CELLS/UL (ref 200–950)
MONOCYTES NFR BLD AUTO: 6.8 %
NEUTROPHILS # BLD AUTO: 4524 CELLS/UL (ref 1500–7800)
NEUTROPHILS NFR BLD AUTO: 69.6 %
PLATELET # BLD AUTO: 213 THOUSAND/UL (ref 140–400)
PMV BLD REES-ECKER: 10.6 FL (ref 7.5–12.5)
POTASSIUM SERPL-SCNC: 4.1 MMOL/L (ref 3.5–5.3)
PROT SERPL-MCNC: 6.9 G/DL (ref 6.1–8.1)
PSA FREE MFR SERPL: 38 % (CALC)
PSA FREE SERPL-MCNC: 0.3 NG/ML
PSA SERPL-MCNC: 0.8 NG/ML
RBC # BLD AUTO: 4.77 MILLION/UL (ref 4.2–5.8)
SL AMB EGFR AFRICAN AMERICAN: 103 ML/MIN/1.73M2
SL AMB EGFR NON AFRICAN AMERICAN: 89 ML/MIN/1.73M2
SODIUM SERPL-SCNC: 139 MMOL/L (ref 135–146)
TSH SERPL-ACNC: 1.82 MIU/L (ref 0.4–4.5)
WBC # BLD AUTO: 6.5 THOUSAND/UL (ref 3.8–10.8)

## 2018-12-28 NOTE — TELEPHONE ENCOUNTER
Pt aware    ----- Message from Anoop Harris MD sent at 12/28/2018  2:08 PM EST -----  PSA level is also normal

## 2019-01-17 ENCOUNTER — HOSPITAL ENCOUNTER (OUTPATIENT)
Dept: ULTRASOUND IMAGING | Facility: HOSPITAL | Age: 54
Discharge: HOME | End: 2019-01-17
Payer: COMMERCIAL

## 2019-01-17 DIAGNOSIS — R10.9 RIGHT SIDED ABDOMINAL PAIN: ICD-10-CM

## 2019-01-17 PROCEDURE — 76705 ECHO EXAM OF ABDOMEN: CPT

## 2019-01-21 ENCOUNTER — TELEPHONE (OUTPATIENT)
Dept: FAMILY MEDICINE CLINIC | Facility: OTHER | Age: 54
End: 2019-01-21

## 2019-01-21 NOTE — TELEPHONE ENCOUNTER
Pt aware    ----- Message from Guille Coleman MD sent at 1/18/2019  4:31 PM EST -----  The abdomen US is normal   Please follow office recommendations

## 2019-02-08 ENCOUNTER — OFFICE VISIT (OUTPATIENT)
Dept: GASTROENTEROLOGY | Facility: AMBULARY SURGERY CENTER | Age: 54
End: 2019-02-08
Payer: COMMERCIAL

## 2019-02-08 VITALS
HEIGHT: 74 IN | HEART RATE: 85 BPM | WEIGHT: 286.4 LBS | TEMPERATURE: 98.9 F | RESPIRATION RATE: 20 BRPM | SYSTOLIC BLOOD PRESSURE: 138 MMHG | DIASTOLIC BLOOD PRESSURE: 82 MMHG | BODY MASS INDEX: 36.76 KG/M2

## 2019-02-08 DIAGNOSIS — Z12.11 SCREENING FOR COLON CANCER: Primary | ICD-10-CM

## 2019-02-08 DIAGNOSIS — R10.11 RIGHT UPPER QUADRANT ABDOMINAL PAIN: ICD-10-CM

## 2019-02-08 PROCEDURE — 99244 OFF/OP CNSLTJ NEW/EST MOD 40: CPT | Performed by: INTERNAL MEDICINE

## 2019-02-08 NOTE — ASSESSMENT & PLAN NOTE
Nonspecific pain appear to be most likely musculoskeletal   Doubt for biliary pathology or peptic ulcer disease     -advised him to observe and call for any recurrent or persistent symptoms associated with GI issues

## 2019-02-08 NOTE — PROGRESS NOTES
Consultation - [facility]  Caroline Yoeljoaquin 1965 male         Chief Complaint:  For colonoscopy, right upper quadrant pain    HPI:  68-year-old male with history of coronary artery disease status post stent placement on Plavix reports having right upper quadrant pain for couple of months  He describes as sporadic nonspecific discomfort worse with movement  Denies any relationship with meal   No nausea or vomiting  Good appetite, no recent weight loss  Regular bowel movements and denies any blood or mucus in the stool  No heartburn acid reflux  Denies any difficulty swallowing  He had lab work and ultrasound done which were unremarkable  REVIEW OF SYSTEMS: Review of Systems   Constitutional: Negative for activity change, appetite change, chills, diaphoresis, fatigue, fever and unexpected weight change  HENT: Negative for ear discharge, ear pain, facial swelling, hearing loss, nosebleeds, sore throat, tinnitus and voice change  Eyes: Negative for pain, discharge, redness, itching and visual disturbance  Respiratory: Negative for apnea, cough, chest tightness, shortness of breath and wheezing  Cardiovascular: Negative for chest pain and palpitations  Gastrointestinal:        As noted in HPI   Endocrine: Negative for cold intolerance, heat intolerance and polyuria  Genitourinary: Negative for difficulty urinating, dysuria, flank pain, hematuria and urgency  Musculoskeletal: Negative for arthralgias, back pain, gait problem, joint swelling and myalgias  Skin: Negative for rash and wound  Neurological: Negative for dizziness, tremors, seizures, speech difficulty, light-headedness, numbness and headaches  Hematological: Negative for adenopathy  Does not bruise/bleed easily  Psychiatric/Behavioral: Negative for agitation, behavioral problems and confusion  The patient is not nervous/anxious           Past Medical History:   Diagnosis Date    CAD (coronary artery disease)     Dyslipidemia     Hyperlipidemia     Hypertension     Obesity       Past Surgical History:   Procedure Laterality Date    ADENOIDECTOMY      CORONARY ANGIOPLASTY WITH STENT PLACEMENT      CORONARY ARTERY BYPASS GRAFT       Social History     Social History    Marital status: /Civil Union     Spouse name: N/A    Number of children: N/A    Years of education: N/A     Occupational History          Social History Main Topics    Smoking status: Never Smoker    Smokeless tobacco: Never Used    Alcohol use Yes      Comment: 2 per week    Drug use: No    Sexual activity: Not on file     Other Topics Concern    Not on file     Social History Narrative    No narrative on file     Family History   Problem Relation Age of Onset    Diabetes Mother         on the mother's side    Heart failure Maternal Grandmother     Heart attack Maternal Grandfather     Hypertension Family     Liver cancer Father     Stomach cancer Father     Hyperlipidemia Neg Hx     Anuerysm Neg Hx     Arrhythmia Neg Hx     Sudden death Neg Hx     Stroke Neg Hx     Clotting disorder Neg Hx      Patient has no known allergies  Current Outpatient Prescriptions   Medication Sig Dispense Refill    atorvastatin (LIPITOR) 80 mg tablet TAKE 1 TABLET DAILY 90 tablet 2    carvedilol (COREG) 12 5 mg tablet TAKE 1 TABLET TWICE A  tablet 3    clopidogrel (PLAVIX) 75 mg tablet Take 1 tablet (75 mg total) by mouth daily 90 tablet 3    Na Sulfate-K Sulfate-Mg Sulf (SUPREP BOWEL PREP KIT) 17 5-3 13-1 6 GM/177ML SOLN Take 2 Bottles by mouth see administration instructions Please follow the instructions from the office 2 Bottle 0    nitroglycerin (NITROSTAT) 0 4 mg SL tablet Place under the tongue  No current facility-administered medications for this visit  Blood pressure 138/82, pulse 85, temperature 98 9 °F (37 2 °C), temperature source Tympanic, resp   rate 20, height 6' 2" (1 88 m), weight 130 kg (286 lb 6 4 oz)  PHYSICAL EXAM: Physical Exam   Constitutional: He is oriented to person, place, and time  He appears well-developed  HENT:   Head: Normocephalic and atraumatic  Mouth/Throat: Oropharynx is clear and moist    Eyes: Pupils are equal, round, and reactive to light  Conjunctivae are normal  Right eye exhibits no discharge  Left eye exhibits no discharge  No scleral icterus  Neck: Neck supple  No JVD present  No tracheal deviation present  No thyromegaly present  Cardiovascular: Normal rate, regular rhythm, normal heart sounds and intact distal pulses  Exam reveals no gallop and no friction rub  No murmur heard  Pulmonary/Chest: Effort normal and breath sounds normal  No respiratory distress  He has no wheezes  He has no rales  He exhibits no tenderness  Abdominal: Soft  Bowel sounds are normal  He exhibits no distension and no mass  There is no tenderness  There is no rebound and no guarding  No hernia  Musculoskeletal: He exhibits no edema  Lymphadenopathy:     He has no cervical adenopathy  Neurological: He is alert and oriented to person, place, and time  Skin: Skin is warm and dry  No rash noted  No erythema  Psychiatric: He has a normal mood and affect   His behavior is normal  Thought content normal         Lab Results   Component Value Date    WBC 6 5 12/27/2018    HGB 15 2 12/27/2018    HCT 43 5 12/27/2018    MCV 91 2 12/27/2018     12/27/2018     Lab Results   Component Value Date    CALCIUM 9 1 12/27/2018     05/02/2017     05/02/2017    K 4 1 12/27/2018    CO2 27 12/27/2018     12/27/2018    BUN 16 12/27/2018    CREATININE 1 00 05/02/2017    CREATININE 1 00 05/02/2017     Lab Results   Component Value Date    ALT 25 12/27/2018    AST 23 12/27/2018    ALKPHOS 80 12/27/2018    BILITOT 0 9 04/22/2017     Lab Results   Component Value Date    INR 1 07 04/24/2017    INR 1 0 04/22/2017    INR 1 06 09/16/2016    PROTIME 13 7 04/24/2017    PROTIME 10 1 04/22/2017    PROTIME 13 6 09/16/2016       Us Abdomen Limited    Result Date: 1/18/2019  Impression: Normal  Workstation performed: [id number]       ASSESSMENT & PLAN:    Right upper quadrant abdominal pain  Nonspecific pain appear to be most likely musculoskeletal   Doubt for biliary pathology or peptic ulcer disease     -advised him to observe and call for any recurrent or persistent symptoms associated with GI issues  Screening for colon cancer  Screening for colon cancer - patient is at average risk for colon cancer screening  Rule out colorectal lesions including polyps or malignancy         -Schedule for colonoscopy  -High-fiber diet     -Patient was given instructions about the colonoscopy prep     -Patient was explained about  the risks and benefits of the procedure  Risks including but not limited to bleeding, infection, perforation were explained in detail  Also explained about less than 100% sensitivity with the exam and other alternatives

## 2019-02-08 NOTE — LETTER
February 8, 2019     [address]    Patient: Kami Diaz   YOB: 1965   Date of Visit: 2/8/2019       Dear Dr Maribel Millan: Thank you for referring Kami Diaz to me for evaluation  Below are my notes for this consultation  If you have questions, please do not hesitate to call me  I look forward to following your patient along with you  Sincerely,        Noemi Harrison MD        CC: No Recipients  Noemi Harrison MD  2/8/2019  5:08 PM  Sign at close encounter  Consultation - [facility]  Kami Diaz 1965 male         Chief Complaint:  For colonoscopy, right upper quadrant pain    HPI:  61-year-old male with history of coronary artery disease status post stent placement on Plavix reports having right upper quadrant pain for couple of months  He describes as sporadic nonspecific discomfort worse with movement  Denies any relationship with meal   No nausea or vomiting  Good appetite, no recent weight loss  Regular bowel movements and denies any blood or mucus in the stool  No heartburn acid reflux  Denies any difficulty swallowing  He had lab work and ultrasound done which were unremarkable  REVIEW OF SYSTEMS: Review of Systems   Constitutional: Negative for activity change, appetite change, chills, diaphoresis, fatigue, fever and unexpected weight change  HENT: Negative for ear discharge, ear pain, facial swelling, hearing loss, nosebleeds, sore throat, tinnitus and voice change  Eyes: Negative for pain, discharge, redness, itching and visual disturbance  Respiratory: Negative for apnea, cough, chest tightness, shortness of breath and wheezing  Cardiovascular: Negative for chest pain and palpitations  Gastrointestinal:        As noted in HPI   Endocrine: Negative for cold intolerance, heat intolerance and polyuria     Genitourinary: Negative for difficulty urinating, dysuria, flank pain, hematuria and urgency  Musculoskeletal: Negative for arthralgias, back pain, gait problem, joint swelling and myalgias  Skin: Negative for rash and wound  Neurological: Negative for dizziness, tremors, seizures, speech difficulty, light-headedness, numbness and headaches  Hematological: Negative for adenopathy  Does not bruise/bleed easily  Psychiatric/Behavioral: Negative for agitation, behavioral problems and confusion  The patient is not nervous/anxious  Past Medical History:   Diagnosis Date    CAD (coronary artery disease)     Dyslipidemia     Hyperlipidemia     Hypertension     Obesity       Past Surgical History:   Procedure Laterality Date    ADENOIDECTOMY      CORONARY ANGIOPLASTY WITH STENT PLACEMENT      CORONARY ARTERY BYPASS GRAFT       Social History     Social History    Marital status: /Civil Union     Spouse name: N/A    Number of children: N/A    Years of education: N/A     Occupational History          Social History Main Topics    Smoking status: Never Smoker    Smokeless tobacco: Never Used    Alcohol use Yes      Comment: 2 per week    Drug use: No    Sexual activity: Not on file     Other Topics Concern    Not on file     Social History Narrative    No narrative on file     Family History   Problem Relation Age of Onset    Diabetes Mother         on the mother's side    Heart failure Maternal Grandmother     Heart attack Maternal Grandfather     Hypertension Family     Liver cancer Father     Stomach cancer Father     Hyperlipidemia Neg Hx     Anuerysm Neg Hx     Arrhythmia Neg Hx     Sudden death Neg Hx     Stroke Neg Hx     Clotting disorder Neg Hx      Patient has no known allergies    Current Outpatient Prescriptions   Medication Sig Dispense Refill    atorvastatin (LIPITOR) 80 mg tablet TAKE 1 TABLET DAILY 90 tablet 2    carvedilol (COREG) 12 5 mg tablet TAKE 1 TABLET TWICE A  tablet 3    clopidogrel (PLAVIX) 75 mg tablet Take 1 tablet (75 mg total) by mouth daily 90 tablet 3    Na Sulfate-K Sulfate-Mg Sulf (SUPREP BOWEL PREP KIT) 17 5-3 13-1 6 GM/177ML SOLN Take 2 Bottles by mouth see administration instructions Please follow the instructions from the office 2 Bottle 0    nitroglycerin (NITROSTAT) 0 4 mg SL tablet Place under the tongue  No current facility-administered medications for this visit  Blood pressure 138/82, pulse 85, temperature 98 9 °F (37 2 °C), temperature source Tympanic, resp  rate 20, height 6' 2" (1 88 m), weight 130 kg (286 lb 6 4 oz)  PHYSICAL EXAM: Physical Exam   Constitutional: He is oriented to person, place, and time  He appears well-developed  HENT:   Head: Normocephalic and atraumatic  Mouth/Throat: Oropharynx is clear and moist    Eyes: Pupils are equal, round, and reactive to light  Conjunctivae are normal  Right eye exhibits no discharge  Left eye exhibits no discharge  No scleral icterus  Neck: Neck supple  No JVD present  No tracheal deviation present  No thyromegaly present  Cardiovascular: Normal rate, regular rhythm, normal heart sounds and intact distal pulses  Exam reveals no gallop and no friction rub  No murmur heard  Pulmonary/Chest: Effort normal and breath sounds normal  No respiratory distress  He has no wheezes  He has no rales  He exhibits no tenderness  Abdominal: Soft  Bowel sounds are normal  He exhibits no distension and no mass  There is no tenderness  There is no rebound and no guarding  No hernia  Musculoskeletal: He exhibits no edema  Lymphadenopathy:     He has no cervical adenopathy  Neurological: He is alert and oriented to person, place, and time  Skin: Skin is warm and dry  No rash noted  No erythema  Psychiatric: He has a normal mood and affect   His behavior is normal  Thought content normal         Lab Results   Component Value Date    WBC 6 5 12/27/2018    HGB 15 2 12/27/2018    HCT 43 5 12/27/2018    MCV 91 2 12/27/2018     12/27/2018     Lab Results   Component Value Date    CALCIUM 9 1 12/27/2018     05/02/2017     05/02/2017    K 4 1 12/27/2018    CO2 27 12/27/2018     12/27/2018    BUN 16 12/27/2018    CREATININE 1 00 05/02/2017    CREATININE 1 00 05/02/2017     Lab Results   Component Value Date    ALT 25 12/27/2018    AST 23 12/27/2018    ALKPHOS 80 12/27/2018    BILITOT 0 9 04/22/2017     Lab Results   Component Value Date    INR 1 07 04/24/2017    INR 1 0 04/22/2017    INR 1 06 09/16/2016    PROTIME 13 7 04/24/2017    PROTIME 10 1 04/22/2017    PROTIME 13 6 09/16/2016       Us Abdomen Limited    Result Date: 1/18/2019  Impression: Normal  Workstation performed: [id number]       ASSESSMENT & PLAN:    Right upper quadrant abdominal pain  Nonspecific pain appear to be most likely musculoskeletal   Doubt for biliary pathology or peptic ulcer disease     -advised him to observe and call for any recurrent or persistent symptoms associated with GI issues  Screening for colon cancer  Screening for colon cancer - patient is at average risk for colon cancer screening  Rule out colorectal lesions including polyps or malignancy         -Schedule for colonoscopy  -High-fiber diet     -Patient was given instructions about the colonoscopy prep     -Patient was explained about  the risks and benefits of the procedure  Risks including but not limited to bleeding, infection, perforation were explained in detail  Also explained about less than 100% sensitivity with the exam and other alternatives

## 2019-02-08 NOTE — H&P (VIEW-ONLY)
Consultation - [facility]  Ronnell Caesarjj 1965 male         Chief Complaint:  For colonoscopy, right upper quadrant pain    HPI:  82-year-old male with history of coronary artery disease status post stent placement on Plavix reports having right upper quadrant pain for couple of months  He describes as sporadic nonspecific discomfort worse with movement  Denies any relationship with meal   No nausea or vomiting  Good appetite, no recent weight loss  Regular bowel movements and denies any blood or mucus in the stool  No heartburn acid reflux  Denies any difficulty swallowing  He had lab work and ultrasound done which were unremarkable  REVIEW OF SYSTEMS: Review of Systems   Constitutional: Negative for activity change, appetite change, chills, diaphoresis, fatigue, fever and unexpected weight change  HENT: Negative for ear discharge, ear pain, facial swelling, hearing loss, nosebleeds, sore throat, tinnitus and voice change  Eyes: Negative for pain, discharge, redness, itching and visual disturbance  Respiratory: Negative for apnea, cough, chest tightness, shortness of breath and wheezing  Cardiovascular: Negative for chest pain and palpitations  Gastrointestinal:        As noted in HPI   Endocrine: Negative for cold intolerance, heat intolerance and polyuria  Genitourinary: Negative for difficulty urinating, dysuria, flank pain, hematuria and urgency  Musculoskeletal: Negative for arthralgias, back pain, gait problem, joint swelling and myalgias  Skin: Negative for rash and wound  Neurological: Negative for dizziness, tremors, seizures, speech difficulty, light-headedness, numbness and headaches  Hematological: Negative for adenopathy  Does not bruise/bleed easily  Psychiatric/Behavioral: Negative for agitation, behavioral problems and confusion  The patient is not nervous/anxious           Past Medical History:   Diagnosis Date    CAD (coronary artery disease)     Dyslipidemia     Hyperlipidemia     Hypertension     Obesity       Past Surgical History:   Procedure Laterality Date    ADENOIDECTOMY      CORONARY ANGIOPLASTY WITH STENT PLACEMENT      CORONARY ARTERY BYPASS GRAFT       Social History     Social History    Marital status: /Civil Union     Spouse name: N/A    Number of children: N/A    Years of education: N/A     Occupational History          Social History Main Topics    Smoking status: Never Smoker    Smokeless tobacco: Never Used    Alcohol use Yes      Comment: 2 per week    Drug use: No    Sexual activity: Not on file     Other Topics Concern    Not on file     Social History Narrative    No narrative on file     Family History   Problem Relation Age of Onset    Diabetes Mother         on the mother's side    Heart failure Maternal Grandmother     Heart attack Maternal Grandfather     Hypertension Family     Liver cancer Father     Stomach cancer Father     Hyperlipidemia Neg Hx     Anuerysm Neg Hx     Arrhythmia Neg Hx     Sudden death Neg Hx     Stroke Neg Hx     Clotting disorder Neg Hx      Patient has no known allergies  Current Outpatient Prescriptions   Medication Sig Dispense Refill    atorvastatin (LIPITOR) 80 mg tablet TAKE 1 TABLET DAILY 90 tablet 2    carvedilol (COREG) 12 5 mg tablet TAKE 1 TABLET TWICE A  tablet 3    clopidogrel (PLAVIX) 75 mg tablet Take 1 tablet (75 mg total) by mouth daily 90 tablet 3    Na Sulfate-K Sulfate-Mg Sulf (SUPREP BOWEL PREP KIT) 17 5-3 13-1 6 GM/177ML SOLN Take 2 Bottles by mouth see administration instructions Please follow the instructions from the office 2 Bottle 0    nitroglycerin (NITROSTAT) 0 4 mg SL tablet Place under the tongue  No current facility-administered medications for this visit  Blood pressure 138/82, pulse 85, temperature 98 9 °F (37 2 °C), temperature source Tympanic, resp   rate 20, height 6' 2" (1 88 m), weight 130 kg (286 lb 6 4 oz)  PHYSICAL EXAM: Physical Exam   Constitutional: He is oriented to person, place, and time  He appears well-developed  HENT:   Head: Normocephalic and atraumatic  Mouth/Throat: Oropharynx is clear and moist    Eyes: Pupils are equal, round, and reactive to light  Conjunctivae are normal  Right eye exhibits no discharge  Left eye exhibits no discharge  No scleral icterus  Neck: Neck supple  No JVD present  No tracheal deviation present  No thyromegaly present  Cardiovascular: Normal rate, regular rhythm, normal heart sounds and intact distal pulses  Exam reveals no gallop and no friction rub  No murmur heard  Pulmonary/Chest: Effort normal and breath sounds normal  No respiratory distress  He has no wheezes  He has no rales  He exhibits no tenderness  Abdominal: Soft  Bowel sounds are normal  He exhibits no distension and no mass  There is no tenderness  There is no rebound and no guarding  No hernia  Musculoskeletal: He exhibits no edema  Lymphadenopathy:     He has no cervical adenopathy  Neurological: He is alert and oriented to person, place, and time  Skin: Skin is warm and dry  No rash noted  No erythema  Psychiatric: He has a normal mood and affect   His behavior is normal  Thought content normal         Lab Results   Component Value Date    WBC 6 5 12/27/2018    HGB 15 2 12/27/2018    HCT 43 5 12/27/2018    MCV 91 2 12/27/2018     12/27/2018     Lab Results   Component Value Date    CALCIUM 9 1 12/27/2018     05/02/2017     05/02/2017    K 4 1 12/27/2018    CO2 27 12/27/2018     12/27/2018    BUN 16 12/27/2018    CREATININE 1 00 05/02/2017    CREATININE 1 00 05/02/2017     Lab Results   Component Value Date    ALT 25 12/27/2018    AST 23 12/27/2018    ALKPHOS 80 12/27/2018    BILITOT 0 9 04/22/2017     Lab Results   Component Value Date    INR 1 07 04/24/2017    INR 1 0 04/22/2017    INR 1 06 09/16/2016    PROTIME 13 7 04/24/2017    PROTIME 10 1 04/22/2017    PROTIME 13 6 09/16/2016       Us Abdomen Limited    Result Date: 1/18/2019  Impression: Normal  Workstation performed: [id number]       ASSESSMENT & PLAN:    Right upper quadrant abdominal pain  Nonspecific pain appear to be most likely musculoskeletal   Doubt for biliary pathology or peptic ulcer disease     -advised him to observe and call for any recurrent or persistent symptoms associated with GI issues  Screening for colon cancer  Screening for colon cancer - patient is at average risk for colon cancer screening  Rule out colorectal lesions including polyps or malignancy         -Schedule for colonoscopy  -High-fiber diet     -Patient was given instructions about the colonoscopy prep     -Patient was explained about  the risks and benefits of the procedure  Risks including but not limited to bleeding, infection, perforation were explained in detail  Also explained about less than 100% sensitivity with the exam and other alternatives

## 2019-02-11 ENCOUNTER — TELEPHONE (OUTPATIENT)
Dept: CARDIOLOGY CLINIC | Facility: CLINIC | Age: 54
End: 2019-02-11

## 2019-02-11 ENCOUNTER — ANESTHESIA EVENT (OUTPATIENT)
Dept: PERIOP | Facility: AMBULARY SURGERY CENTER | Age: 54
End: 2019-02-11
Payer: COMMERCIAL

## 2019-02-11 NOTE — TELEPHONE ENCOUNTER
Received a fax from GI asking permission to hold Plavix 5 days prior to colonoscopy scheduled for 2/19/19 by Dr Jose Ramirez?

## 2019-02-13 ENCOUNTER — TELEPHONE (OUTPATIENT)
Dept: GASTROENTEROLOGY | Facility: AMBULARY SURGERY CENTER | Age: 54
End: 2019-02-13

## 2019-02-19 ENCOUNTER — HOSPITAL ENCOUNTER (OUTPATIENT)
Facility: AMBULARY SURGERY CENTER | Age: 54
Setting detail: OUTPATIENT SURGERY
Discharge: HOME | End: 2019-02-19
Attending: INTERNAL MEDICINE | Admitting: INTERNAL MEDICINE
Payer: COMMERCIAL

## 2019-02-19 ENCOUNTER — ANESTHESIA (OUTPATIENT)
Dept: PERIOP | Facility: AMBULARY SURGERY CENTER | Age: 54
End: 2019-02-19
Payer: COMMERCIAL

## 2019-02-19 VITALS
TEMPERATURE: 98.2 F | BODY MASS INDEX: 35.29 KG/M2 | RESPIRATION RATE: 16 BRPM | HEIGHT: 74 IN | OXYGEN SATURATION: 99 % | DIASTOLIC BLOOD PRESSURE: 89 MMHG | SYSTOLIC BLOOD PRESSURE: 134 MMHG | HEART RATE: 80 BPM | WEIGHT: 275 LBS

## 2019-02-19 PROCEDURE — G0105 COLORECTAL SCRN; HI RISK IND: HCPCS | Performed by: INTERNAL MEDICINE

## 2019-02-19 RX ORDER — SODIUM CHLORIDE 9 MG/ML
INJECTION, SOLUTION INTRAVENOUS CONTINUOUS PRN
Status: DISCONTINUED | OUTPATIENT
Start: 2019-02-19 | End: 2019-02-19 | Stop reason: SURG

## 2019-02-19 RX ORDER — ASPIRIN 81 MG/1
81 TABLET ORAL DAILY
COMMUNITY

## 2019-02-19 RX ORDER — PROPOFOL 10 MG/ML
INJECTION, EMULSION INTRAVENOUS AS NEEDED
Status: DISCONTINUED | OUTPATIENT
Start: 2019-02-19 | End: 2019-02-19 | Stop reason: SURG

## 2019-02-19 RX ORDER — LIDOCAINE HYDROCHLORIDE 10 MG/ML
INJECTION, SOLUTION INFILTRATION; PERINEURAL AS NEEDED
Status: DISCONTINUED | OUTPATIENT
Start: 2019-02-19 | End: 2019-02-19 | Stop reason: SURG

## 2019-02-19 RX ADMIN — PROPOFOL 50 MG: 10 INJECTION, EMULSION INTRAVENOUS at 09:49

## 2019-02-19 RX ADMIN — PROPOFOL 50 MG: 10 INJECTION, EMULSION INTRAVENOUS at 09:45

## 2019-02-19 RX ADMIN — PROPOFOL 50 MG: 10 INJECTION, EMULSION INTRAVENOUS at 09:47

## 2019-02-19 RX ADMIN — SODIUM CHLORIDE: 0.9 INJECTION, SOLUTION INTRAVENOUS at 09:28

## 2019-02-19 RX ADMIN — PROPOFOL 100 MG: 10 INJECTION, EMULSION INTRAVENOUS at 09:42

## 2019-02-19 RX ADMIN — LIDOCAINE HYDROCHLORIDE ANHYDROUS 20 MG: 10 INJECTION, SOLUTION INFILTRATION at 09:42

## 2019-02-19 NOTE — DISCHARGE INSTR - AVS FIRST PAGE
COLONOSCOPY    PROCEDURE: Colonoscopy    INDICATIONS: Screening for Colon Cancer    POST-OP DIAGNOSIS: See the impression below    SEDATION: Monitored anesthesia care, check anesthesia records    PRIOR COLONOSCOPY: No prior colonoscopy  CONSENT:  Informed consent was obtained for the procedure, including sedation after explaining the risks and benefits of the procedure  Risks including but not limited to bleeding, perforation, infection, aspiration were discussed in detail  Also explained about less than 100%$ sensitivity with the exam and other alternatives  PREPARATION:   EKG tracing, pulse oximetry, blood pressure were monitored throughout the procedure  Patient was identified by myself both verbally and by visual inspection of ID band  DESCRIPTION:   Patient was placed in the left lateral decubitus position and was sedated with the above medication  Digital rectal examination was performed  The colonoscope was introduced in to the anal canal and advanced up to cecum, which was identified by the appendiceal orifice and IC valve  A careful inspection was made as the colonoscope was withdrawn, including a retroflexed view of the rectum; findings and interventions are described below  Appropriate photodocumentation was obtained  The quality of the colonic preparation was adequate  FINDINGS:    1  Cecum and cecal valve-normal mucosa    2  Diverticula seen in the descending colon and sigmoid colon         IMPRESSIONS:      As above    RECOMMENDATIONS:    Repeat colonoscopy in 10 years or sooner if clinically indicated  COMPLICATIONS:  None; patient tolerated the procedure well      DISPOSITION: PACU           CONDITION: Stable

## 2019-02-19 NOTE — ANESTHESIA PREPROCEDURE EVALUATION
Review of Systems/Medical History  Patient summary reviewed  Chart reviewed  No history of anesthetic complications     Cardiovascular  EKG reviewed, Exercise tolerance (METS): >4,  Hyperlipidemia, Hypertension on > 1 medication, CAD , CAD status: 3VD, Cardiac stents > 1 year and drug eluting No angina , No KEBEDE,    Pulmonary       GI/Hepatic            Endo/Other    Obesity    GYN       Hematology   Musculoskeletal       Neurology   Psychology           Physical Exam    Airway    Mallampati score: III  TM Distance: >3 FB  Neck ROM: full     Dental   No notable dental hx     Cardiovascular      Pulmonary      Other Findings        Anesthesia Plan  ASA Score- 3     Anesthesia Type- IV sedation with anesthesia with ASA Monitors  Additional Monitors:   Airway Plan:         Plan Factors-    Induction- intravenous  Postoperative Plan-     Informed Consent- Anesthetic plan and risks discussed with patient  I personally reviewed this patient with the CRNA  Discussed and agreed on the Anesthesia Plan with the CRNA  Tara Pat

## 2019-02-19 NOTE — OP NOTE
COLONOSCOPY    PROCEDURE: Colonoscopy    INDICATIONS: Screening for Colon Cancer/ Family history of polyps in brother    POST-OP DIAGNOSIS: See the impression below    SEDATION: Monitored anesthesia care, check anesthesia records    PRIOR COLONOSCOPY: No prior colonoscopy  CONSENT:  Informed consent was obtained for the procedure, including sedation after explaining the risks and benefits of the procedure  Risks including but not limited to bleeding, perforation, infection, aspiration were discussed in detail  Also explained about less than 100%$ sensitivity with the exam and other alternatives  PREPARATION:   EKG tracing, pulse oximetry, blood pressure were monitored throughout the procedure  Patient was identified by myself both verbally and by visual inspection of ID band  DESCRIPTION:   Patient was placed in the left lateral decubitus position and was sedated with the above medication  Digital rectal examination was performed  The colonoscope was introduced in to the anal canal and advanced up to cecum, which was identified by the appendiceal orifice and IC valve  A careful inspection was made as the colonoscope was withdrawn, including a retroflexed view of the rectum; findings and interventions are described below  Appropriate photodocumentation was obtained  The quality of the colonic preparation was adequate  FINDINGS:    1  Cecum and cecal valve-normal mucosa    2  Diverticula seen in the descending colon and sigmoid colon         IMPRESSIONS:      As above    RECOMMENDATIONS:    Repeat colonoscopy in 5 years    COMPLICATIONS:  None; patient tolerated the procedure well      DISPOSITION: PACU           CONDITION: Stable

## 2019-04-22 DIAGNOSIS — E78.5 DYSLIPIDEMIA: ICD-10-CM

## 2019-04-22 RX ORDER — ATORVASTATIN CALCIUM 80 MG/1
TABLET, FILM COATED ORAL
Qty: 90 TABLET | Refills: 2 | Status: SHIPPED | OUTPATIENT
Start: 2019-04-22 | End: 2019-12-26 | Stop reason: SDUPTHER

## 2019-10-14 ENCOUNTER — OFFICE VISIT (OUTPATIENT)
Dept: CARDIOLOGY CLINIC | Facility: CLINIC | Age: 54
End: 2019-10-14
Payer: COMMERCIAL

## 2019-10-14 VITALS
SYSTOLIC BLOOD PRESSURE: 132 MMHG | DIASTOLIC BLOOD PRESSURE: 74 MMHG | HEIGHT: 74 IN | OXYGEN SATURATION: 96 % | BODY MASS INDEX: 36.19 KG/M2 | WEIGHT: 282 LBS | HEART RATE: 76 BPM

## 2019-10-14 DIAGNOSIS — I25.10 CORONARY ARTERY DISEASE INVOLVING NATIVE CORONARY ARTERY OF NATIVE HEART WITHOUT ANGINA PECTORIS: Primary | ICD-10-CM

## 2019-10-14 PROCEDURE — 99214 OFFICE O/P EST MOD 30 MIN: CPT | Performed by: INTERNAL MEDICINE

## 2019-10-14 NOTE — PROGRESS NOTES
Cardiology Follow Up    Darlene Dos Santos  1965  [id number]  [facility]  [address] [id number] [phone]    No diagnosis found  Interval History: Followup for CAD    Doing well  No chest pain, no dyspnea, no palpitations           Problem List     Chest pain    Hypertension    NSTEMI (non-ST elevated myocardial infarction) (HCC)    Acute ST elevation myocardial infarction (STEMI) involving right coronary artery (HCC)    Arm pain    Atherosclerosis    Benign essential hypertension    Coronary artery disease    Coronary artery disease involving native coronary artery of native heart without angina pectoris    Dyslipidemia    External hemorrhoid, thrombosed    Fatigue    Fatty liver    Injury, forearm    Lipid disorder    Non-ST elevation (NSTEMI) myocardial infarction (HCC)    Obesity    Plantar warts    Screening for colon cancer    Right upper quadrant abdominal pain        Past Medical History:   Diagnosis Date    CAD (coronary artery disease)     Dyslipidemia     Hyperlipidemia     Hypertension     Obesity      Social History     Socioeconomic History    Marital status: /Civil Union     Spouse name: Not on file    Number of children: Not on file    Years of education: Not on file    Highest education level: Not on file   Occupational History    Occupation:    Social Needs    Financial resource strain: Not on file    Food insecurity:     Worry: Not on file     Inability: Not on file    Transportation needs:     Medical: Not on file     Non-medical: Not on file   Tobacco Use    Smoking status: Never Smoker    Smokeless tobacco: Never Used   Substance and Sexual Activity    Alcohol use: Yes     Frequency: 2-3 times a week     Comment: 2 per week    Drug use: No    Sexual activity: Not on file   Lifestyle    Physical activity:     Days per week: Not on file     Minutes per session: Not on file    Stress: Not on file   Relationships    Social connections:     Talks on phone: Not on file     Gets together: Not on file     Attends Ca[employer] service: Not on file     Active member of club or organization: Not on file     Attends meetings of clubs or organizations: Not on file     Relationship status: Not on file    Intimate partner violence:     Fear of current or ex partner: Not on file     Emotionally abused: Not on file     Physically abused: Not on file     Forced sexual activity: Not on file   Other Topics Concern    Not on file   Social History Narrative    Not on file      Family History   Problem Relation Age of Onset    Diabetes Mother         on the mother's side    Heart failure Maternal Grandmother     Heart attack Maternal Grandfather     Hypertension Family     Liver cancer Father     Stomach cancer Father     Hyperlipidemia Neg Hx     Anuerysm Neg Hx     Arrhythmia Neg Hx     Sudden death Neg Hx     Stroke Neg Hx     Clotting disorder Neg Hx      Past Surgical History:   Procedure Laterality Date    ADENOIDECTOMY      COLONOSCOPY N/A 2/19/2019    Procedure: COLONOSCOPY;  Surgeon: Mena Diaz MD;  [facility];   Service: Gastroenterology    CORONARY ANGIOPLASTY WITH STENT PLACEMENT      CORONARY ARTERY BYPASS GRAFT         Current Outpatient Medications:     aspirin (ECOTRIN LOW STRENGTH) 81 mg EC tablet, Take 81 mg by mouth daily, Disp: , Rfl:     atorvastatin (LIPITOR) 80 mg tablet, TAKE 1 TABLET DAILY, Disp: 90 tablet, Rfl: 2    carvedilol (COREG) 12 5 mg tablet, TAKE 1 TABLET TWICE A DAY, Disp: 180 tablet, Rfl: 3    clopidogrel (PLAVIX) 75 mg tablet, Take 1 tablet (75 mg total) by mouth daily (Patient not taking: Reported on 10/14/2019), Disp: 90 tablet, Rfl: 3    nitroglycerin (NITROSTAT) 0 4 mg SL tablet, Place under the tongue , Disp: , Rfl:   No Known Allergies    Labs:     Chemistry        Component Value Date/Time     05/02/2017 0803     05/02/2017 0803    K 4 1 12/27/2018 0828     12/27/2018 0828    CO2 27 12/27/2018 0828    BUN 16 12/27/2018 0828    CREATININE 0 98 12/27/2018 0828    CREATININE 1 00 05/02/2017 0803    CREATININE 1 00 05/02/2017 0803        Component Value Date/Time    CALCIUM 9 1 12/27/2018 0828    ALKPHOS 80 12/27/2018 0828    AST 23 12/27/2018 0828    ALT 25 12/27/2018 0828    BILITOT 0 9 04/22/2017 0726            Lab Results   Component Value Date    CHOL 124 (L) 01/26/2017    CHOL 203 (H) 03/24/2016     Lab Results   Component Value Date    HDL 49 06/18/2018    HDL 51 04/24/2017    HDL 47 01/26/2017     Lab Results   Component Value Date    LDLCALC 46 04/24/2017    LDLCALC 115 (H) 03/29/2016     Lab Results   Component Value Date    TRIG 93 06/18/2018    TRIG 66 04/24/2017    TRIG 103 01/26/2017     No results found for: CHOLHDL    Imaging: No results found  Review of Systems   Constitution: Negative  HENT: Negative  Eyes: Negative  Cardiovascular: Negative  Respiratory: Negative  Endocrine: Negative  Hematologic/Lymphatic: Negative  Skin: Negative  Musculoskeletal: Negative  Gastrointestinal: Negative  Genitourinary: Negative  Neurological: Negative  Psychiatric/Behavioral: Negative  Allergic/Immunologic: Negative  Vitals:    10/14/19 1337   BP: 132/74   Pulse: 76   SpO2: 96%           Physical Exam   Constitutional: He is oriented to person, place, and time  No distress  HENT:   Mouth/Throat: No oropharyngeal exudate  Eyes: No scleral icterus  Neck: No JVD present  Cardiovascular: Normal rate and regular rhythm  Exam reveals no friction rub  No murmur heard  Pulmonary/Chest: Effort normal and breath sounds normal  No stridor  No respiratory distress  He has no wheezes  Abdominal: Soft  Bowel sounds are normal  He exhibits no distension  There is no tenderness  There is no guarding  Musculoskeletal: He exhibits no edema  Neurological: He is alert and oriented to person, place, and time  Skin: Skin is warm and dry  He is not diaphoretic  Discussion/Summary:    Coronary Artery Disease: History of PCI to the LAD and Distal RCA  LDL 63  His BP is stable  The patient was counseled regarding diagnostic results, instructions for management, risk factor reductions, impressions  total time of encounter was 25 minutes and 15 minutes was spent counseling

## 2019-10-20 DIAGNOSIS — I25.10 CAD (CORONARY ARTERY DISEASE): ICD-10-CM

## 2019-10-21 RX ORDER — CARVEDILOL 12.5 MG/1
TABLET ORAL
Qty: 180 TABLET | Refills: 4 | Status: SHIPPED | OUTPATIENT
Start: 2019-10-21 | End: 2019-12-26 | Stop reason: SDUPTHER

## 2019-12-26 DIAGNOSIS — E78.5 DYSLIPIDEMIA: ICD-10-CM

## 2019-12-26 DIAGNOSIS — I25.10 CAD (CORONARY ARTERY DISEASE): ICD-10-CM

## 2019-12-27 RX ORDER — CARVEDILOL 12.5 MG/1
12.5 TABLET ORAL 2 TIMES DAILY
Qty: 180 TABLET | Refills: 3 | Status: SHIPPED | OUTPATIENT
Start: 2019-12-27 | End: 2020-12-24

## 2019-12-27 RX ORDER — ATORVASTATIN CALCIUM 80 MG/1
80 TABLET, FILM COATED ORAL DAILY
Qty: 90 TABLET | Refills: 3 | Status: SHIPPED | OUTPATIENT
Start: 2019-12-27 | End: 2020-12-24

## 2020-08-03 ENCOUNTER — OFFICE VISIT (OUTPATIENT)
Dept: FAMILY MEDICINE CLINIC | Facility: OTHER | Age: 55
End: 2020-08-03
Payer: COMMERCIAL

## 2020-08-03 VITALS
TEMPERATURE: 98.1 F | DIASTOLIC BLOOD PRESSURE: 90 MMHG | WEIGHT: 283.5 LBS | HEIGHT: 74 IN | SYSTOLIC BLOOD PRESSURE: 140 MMHG | HEART RATE: 68 BPM | BODY MASS INDEX: 36.38 KG/M2 | OXYGEN SATURATION: 98 %

## 2020-08-03 DIAGNOSIS — H91.92 HEARING DEFICIT, LEFT: ICD-10-CM

## 2020-08-03 DIAGNOSIS — H92.02 EAR PAIN, LEFT: Primary | ICD-10-CM

## 2020-08-03 DIAGNOSIS — R03.0 ELEVATED BP WITHOUT DIAGNOSIS OF HYPERTENSION: ICD-10-CM

## 2020-08-03 DIAGNOSIS — H61.23 BILATERAL IMPACTED CERUMEN: ICD-10-CM

## 2020-08-03 PROCEDURE — 3725F SCREEN DEPRESSION PERFORMED: CPT | Performed by: FAMILY MEDICINE

## 2020-08-03 PROCEDURE — 3008F BODY MASS INDEX DOCD: CPT | Performed by: FAMILY MEDICINE

## 2020-08-03 PROCEDURE — 99213 OFFICE O/P EST LOW 20 MIN: CPT | Performed by: FAMILY MEDICINE

## 2020-08-03 PROCEDURE — 3080F DIAST BP >= 90 MM HG: CPT | Performed by: FAMILY MEDICINE

## 2020-08-03 PROCEDURE — 1036F TOBACCO NON-USER: CPT | Performed by: FAMILY MEDICINE

## 2020-08-03 PROCEDURE — 3077F SYST BP >= 140 MM HG: CPT | Performed by: FAMILY MEDICINE

## 2020-08-03 RX ORDER — AMOXICILLIN 500 MG/1
CAPSULE ORAL
COMMUNITY
Start: 2020-07-27 | End: 2021-06-29

## 2020-08-03 RX ORDER — NEOMYCIN SULFATE, POLYMYXIN B SULFATE AND HYDROCORTISONE 10; 3.5; 1 MG/ML; MG/ML; [USP'U]/ML
SUSPENSION/ DROPS AURICULAR (OTIC)
COMMUNITY
Start: 2020-07-27 | End: 2020-08-05

## 2020-08-03 NOTE — PROGRESS NOTES
Assessment/Plan:   patient recently evaluated by urgent care treated with amoxicillin for otitis media as well as Polytrim for otitis externa  Cerumen impaction was irrigated however it appears that the he still has significant residual and hearing is significantly affected on the left side  At this time I am not sure if it is the damage to to tympanic membrane or a perforation that is causing hearing deficit versus the cerumen impaction  Patient is being referred to ENT for further evaluation and treatment  Problem List Items Addressed This Visit        Nervous and Auditory    Bilateral impacted cerumen    Relevant Orders    Ambulatory Referral to Otolaryngology       Other    Elevated BP without diagnosis of hypertension:    Patient is advised to have low-salt diet as well as exercise regularly  Blood pressure at urgent care was also at 140/90 range as well as today  Patient does not have any symptoms of chest pain breathing trouble headache dizziness or neurologic deficits  At this point will recheck next visit in 2-4 weeks and if still high then he will need to have an additional medication to control blood pressures  Other Visit Diagnoses     Ear pain, left    -  Primary    Relevant Orders    Ambulatory Referral to Otolaryngology    Hearing deficit, left        Relevant Orders    Ambulatory Referral to Otolaryngology            Subjective:      Patient ID: Cathryn Carreon is a 50 y o  male  Earache    There is pain in the left ear  This is a new problem  The current episode started 1 to 4 weeks ago (10 days ago)  The problem occurs constantly  The problem has been gradually improving (pain has improved but hearing is still diminished and feels fluid in ear  )  There has been no fever  Associated symptoms include hearing loss and neck pain  Pertinent negatives include no coughing, diarrhea, ear discharge, headaches, rhinorrhea, sore throat or vomiting   He has tried antibiotics and ear drops (patient was seen at [facility] on 7/27/20 and treated with left ear irrigation for cerumen impaction and started on amoxicillin for 10 days as well as polytrim ear drops   ) for the symptoms  Improvement on treatment: some relief with pain but still has significant ear wax and ear irriation as well as diminished hearing  cerumen impaction        The following portions of the patient's history were reviewed and updated as appropriate:   He  has a past medical history of CAD (coronary artery disease), Dyslipidemia, Hyperlipidemia, Hypertension, and Obesity  He   Patient Active Problem List    Diagnosis Date Noted    Elevated BP without diagnosis of hypertension 08/03/2020    Bilateral impacted cerumen 08/03/2020    Screening for colon cancer 02/08/2019    Right upper quadrant abdominal pain 02/08/2019    Acute ST elevation myocardial infarction (STEMI) involving right coronary artery ([facility] ) 06/05/2017    Coronary artery disease involving native coronary artery of native heart without angina pectoris 06/05/2017    Lipid disorder 05/31/2017    Arm pain 01/09/2017    Injury, forearm 01/09/2017    Coronary artery disease 05/12/2016    Atherosclerosis 04/05/2016    Dyslipidemia 03/31/2016    Non-ST elevation (NSTEMI) myocardial infarction ([facility] [address] ) 03/31/2016    [facility] 03/30/2016    Chest pain 03/28/2016    Hypertension 03/28/2016    External hemorrhoid, thrombosed 03/21/2016    Fatigue 03/21/2016    Plantar warts 03/28/2013    Benign essential hypertension 03/27/2013    Fatty liver 03/27/2013    Obesity 03/27/2013     He  has a past surgical history that includes Adenoidectomy; Coronary angioplasty with stent; Coronary artery bypass graft; and Colonoscopy (N/A, 2/19/2019)  His family history includes Diabetes in his mother; Heart attack in his maternal grandfather; Heart failure in his maternal grandmother; Hypertension in his family;  Liver cancer in his father; Stomach cancer in his father  He  reports that he has never smoked  He has never used smokeless tobacco  He reports current alcohol use  He reports that he does not use drugs  Current Outpatient Medications   Medication Sig Dispense Refill    amoxicillin (AMOXIL) 500 mg capsule       aspirin (ECOTRIN LOW STRENGTH) 81 mg EC tablet Take 81 mg by mouth daily      atorvastatin (LIPITOR) 80 mg tablet Take 1 tablet (80 mg total) by mouth daily 90 tablet 3    carvedilol (COREG) 12 5 mg tablet Take 1 tablet (12 5 mg total) by mouth 2 (two) times a day 180 tablet 3    neomycin-polymyxin-hydrocortisone (CORTISPORIN) 0 35%-10,000 units/mL-1% otic suspension       nitroglycerin (NITROSTAT) 0 4 mg SL tablet Place under the tongue  No current facility-administered medications for this visit  Current Outpatient Medications on File Prior to Visit   Medication Sig    amoxicillin (AMOXIL) 500 mg capsule     aspirin (ECOTRIN LOW STRENGTH) 81 mg EC tablet Take 81 mg by mouth daily    atorvastatin (LIPITOR) 80 mg tablet Take 1 tablet (80 mg total) by mouth daily    carvedilol (COREG) 12 5 mg tablet Take 1 tablet (12 5 mg total) by mouth 2 (two) times a day    neomycin-polymyxin-hydrocortisone (CORTISPORIN) 0 35%-10,000 units/mL-1% otic suspension     nitroglycerin (NITROSTAT) 0 4 mg SL tablet Place under the tongue  No current facility-administered medications on file prior to visit  He has No Known Allergies       Review of Systems   Constitutional: Negative for fatigue, fever and unexpected weight change  HENT: Positive for ear pain and hearing loss  Negative for ear discharge, rhinorrhea and sore throat  Eyes: Negative for visual disturbance  Respiratory: Negative for cough and shortness of breath  Gastrointestinal: Negative for diarrhea and vomiting  Musculoskeletal: Positive for neck pain  Neurological: Negative for headaches           Objective:      /90 (BP Location: Right arm, Patient Position: Sitting, Cuff Size: Large)   Pulse 68   Temp 98 1 °F (36 7 °C) (Temporal)   Ht 6' 2"   Wt 129 kg (283 lb 8 oz)   SpO2 98%   BMI 36 40 kg/m²          Physical Exam   Constitutional: No distress  HENT:   Head: Normocephalic and atraumatic  Abdominal: Normal appearance  Neurological: He is alert

## 2020-11-24 ENCOUNTER — OFFICE VISIT (OUTPATIENT)
Dept: CARDIOLOGY CLINIC | Facility: CLINIC | Age: 55
End: 2020-11-24
Payer: COMMERCIAL

## 2020-11-24 VITALS
SYSTOLIC BLOOD PRESSURE: 132 MMHG | HEART RATE: 63 BPM | WEIGHT: 291 LBS | TEMPERATURE: 97.1 F | BODY MASS INDEX: 37.36 KG/M2 | DIASTOLIC BLOOD PRESSURE: 90 MMHG

## 2020-11-24 DIAGNOSIS — I25.119 CORONARY ARTERY DISEASE WITH ANGINA PECTORIS, UNSPECIFIED VESSEL OR LESION TYPE, UNSPECIFIED WHETHER NATIVE OR TRANSPLANTED HEART (HCC): Primary | ICD-10-CM

## 2020-11-24 PROCEDURE — 93000 ELECTROCARDIOGRAM COMPLETE: CPT | Performed by: INTERNAL MEDICINE

## 2020-11-24 PROCEDURE — 99214 OFFICE O/P EST MOD 30 MIN: CPT | Performed by: INTERNAL MEDICINE

## 2020-11-24 PROCEDURE — 1036F TOBACCO NON-USER: CPT | Performed by: INTERNAL MEDICINE

## 2020-11-24 PROCEDURE — 3080F DIAST BP >= 90 MM HG: CPT | Performed by: INTERNAL MEDICINE

## 2020-11-24 PROCEDURE — 3075F SYST BP GE 130 - 139MM HG: CPT | Performed by: INTERNAL MEDICINE

## 2020-12-24 DIAGNOSIS — I25.10 CAD (CORONARY ARTERY DISEASE): ICD-10-CM

## 2020-12-24 DIAGNOSIS — E78.5 DYSLIPIDEMIA: ICD-10-CM

## 2020-12-24 RX ORDER — ATORVASTATIN CALCIUM 80 MG/1
TABLET, FILM COATED ORAL
Qty: 90 TABLET | Refills: 3 | Status: SHIPPED | OUTPATIENT
Start: 2020-12-24 | End: 2021-12-28

## 2020-12-24 RX ORDER — CARVEDILOL 12.5 MG/1
12.5 TABLET ORAL 2 TIMES DAILY
Qty: 180 TABLET | Refills: 3 | Status: SHIPPED | OUTPATIENT
Start: 2020-12-24 | End: 2021-12-28

## 2021-03-10 DIAGNOSIS — Z23 ENCOUNTER FOR IMMUNIZATION: ICD-10-CM

## 2021-03-16 ENCOUNTER — IMMUNIZATIONS (OUTPATIENT)
Dept: FAMILY MEDICINE CLINIC | Facility: HOSPITAL | Age: 56
End: 2021-03-16

## 2021-03-16 DIAGNOSIS — Z23 ENCOUNTER FOR IMMUNIZATION: Primary | ICD-10-CM

## 2021-03-16 PROCEDURE — 0001A SARS-COV-2 / COVID-19 MRNA VACCINE (PFIZER-BIONTECH) 30 MCG: CPT

## 2021-03-16 PROCEDURE — 91300 SARS-COV-2 / COVID-19 MRNA VACCINE (PFIZER-BIONTECH) 30 MCG: CPT

## 2021-04-08 ENCOUNTER — IMMUNIZATIONS (OUTPATIENT)
Dept: FAMILY MEDICINE CLINIC | Facility: HOSPITAL | Age: 56
End: 2021-04-08

## 2021-04-08 DIAGNOSIS — Z23 ENCOUNTER FOR IMMUNIZATION: Primary | ICD-10-CM

## 2021-04-08 PROCEDURE — 0002A SARS-COV-2 / COVID-19 MRNA VACCINE (PFIZER-BIONTECH) 30 MCG: CPT

## 2021-04-08 PROCEDURE — 91300 SARS-COV-2 / COVID-19 MRNA VACCINE (PFIZER-BIONTECH) 30 MCG: CPT

## 2021-06-29 ENCOUNTER — HOSPITAL ENCOUNTER (OUTPATIENT)
Facility: HOSPITAL | Age: 56
Setting detail: OBSERVATION
Discharge: HOME | End: 2021-06-30
Attending: EMERGENCY MEDICINE | Admitting: HOSPITALIST
Payer: COMMERCIAL

## 2021-06-29 ENCOUNTER — APPOINTMENT (EMERGENCY)
Dept: RADIOLOGY | Facility: HOSPITAL | Age: 56
End: 2021-06-29
Payer: COMMERCIAL

## 2021-06-29 DIAGNOSIS — R07.9 CHEST PAIN: Primary | ICD-10-CM

## 2021-06-29 LAB
ALBUMIN SERPL BCP-MCNC: 3.8 G/DL (ref 3.5–5)
ALP SERPL-CCNC: 101 U/L (ref 46–116)
ALT SERPL W P-5'-P-CCNC: 43 U/L (ref 12–78)
ANION GAP SERPL CALCULATED.3IONS-SCNC: 10 MMOL/L (ref 4–13)
AST SERPL W P-5'-P-CCNC: 28 U/L (ref 5–45)
ATRIAL RATE: 77 BPM
BASOPHILS # BLD AUTO: 0.05 THOUSANDS/ΜL (ref 0–0.1)
BASOPHILS NFR BLD AUTO: 1 % (ref 0–1)
BILIRUB SERPL-MCNC: 0.93 MG/DL (ref 0.2–1)
BUN SERPL-MCNC: 18 MG/DL (ref 5–25)
CALCIUM SERPL-MCNC: 8.6 MG/DL (ref 8.3–10.1)
CHLORIDE SERPL-SCNC: 103 MMOL/L (ref 100–108)
CO2 SERPL-SCNC: 25 MMOL/L (ref 21–32)
CREAT SERPL-MCNC: 0.96 MG/DL (ref 0.6–1.3)
EOSINOPHIL # BLD AUTO: 0.24 THOUSAND/ΜL (ref 0–0.61)
EOSINOPHIL NFR BLD AUTO: 3 % (ref 0–6)
ERYTHROCYTE [DISTWIDTH] IN BLOOD BY AUTOMATED COUNT: 12 % (ref 11.6–15.1)
GFR SERPL CREATININE-BSD FRML MDRD: 89 ML/MIN/1.73SQ M
GLUCOSE SERPL-MCNC: 106 MG/DL (ref 65–140)
HCT VFR BLD AUTO: 44.8 % (ref 36.5–49.3)
HGB BLD-MCNC: 15.6 G/DL (ref 12–17)
IMM GRANULOCYTES # BLD AUTO: 0.02 THOUSAND/UL (ref 0–0.2)
IMM GRANULOCYTES NFR BLD AUTO: 0 % (ref 0–2)
LYMPHOCYTES # BLD AUTO: 1.57 THOUSANDS/ΜL (ref 0.6–4.47)
LYMPHOCYTES NFR BLD AUTO: 20 % (ref 14–44)
MCH RBC QN AUTO: 31.5 PG (ref 26.8–34.3)
MCHC RBC AUTO-ENTMCNC: 34.8 G/DL (ref 31.4–37.4)
MCV RBC AUTO: 90 FL (ref 82–98)
MONOCYTES # BLD AUTO: 0.53 THOUSAND/ΜL (ref 0.17–1.22)
MONOCYTES NFR BLD AUTO: 7 % (ref 4–12)
NEUTROPHILS # BLD AUTO: 5.53 THOUSANDS/ΜL (ref 1.85–7.62)
NEUTS SEG NFR BLD AUTO: 69 % (ref 43–75)
NRBC BLD AUTO-RTO: 0 /100 WBCS
NT-PROBNP SERPL-MCNC: 40 PG/ML
P AXIS: 21 DEGREES
PLATELET # BLD AUTO: 231 THOUSANDS/UL (ref 149–390)
PLATELET # BLD AUTO: 241 THOUSANDS/UL (ref 149–390)
PMV BLD AUTO: 10.1 FL (ref 8.9–12.7)
PMV BLD AUTO: 10.2 FL (ref 8.9–12.7)
POTASSIUM SERPL-SCNC: 4.1 MMOL/L (ref 3.5–5.3)
PR INTERVAL: 150 MS
PROT SERPL-MCNC: 7.8 G/DL (ref 6.4–8.2)
QRS AXIS: 43 DEGREES
QRSD INTERVAL: 92 MS
QT INTERVAL: 364 MS
QTC INTERVAL: 404 MS
RBC # BLD AUTO: 4.96 MILLION/UL (ref 3.88–5.62)
SODIUM SERPL-SCNC: 138 MMOL/L (ref 136–145)
T WAVE AXIS: 54 DEGREES
TROPONIN I SERPL-MCNC: <0.02 NG/ML
VENTRICULAR RATE: 74 BPM
WBC # BLD AUTO: 7.94 THOUSAND/UL (ref 4.31–10.16)

## 2021-06-29 PROCEDURE — 83880 ASSAY OF NATRIURETIC PEPTIDE: CPT | Performed by: EMERGENCY MEDICINE

## 2021-06-29 PROCEDURE — 80053 COMPREHEN METABOLIC PANEL: CPT | Performed by: EMERGENCY MEDICINE

## 2021-06-29 PROCEDURE — 84484 ASSAY OF TROPONIN QUANT: CPT | Performed by: EMERGENCY MEDICINE

## 2021-06-29 PROCEDURE — 99285 EMERGENCY DEPT VISIT HI MDM: CPT

## 2021-06-29 PROCEDURE — 85049 AUTOMATED PLATELET COUNT: CPT | Performed by: HOSPITALIST

## 2021-06-29 PROCEDURE — 93010 ELECTROCARDIOGRAM REPORT: CPT | Performed by: INTERNAL MEDICINE

## 2021-06-29 PROCEDURE — 71045 X-RAY EXAM CHEST 1 VIEW: CPT

## 2021-06-29 PROCEDURE — 84484 ASSAY OF TROPONIN QUANT: CPT | Performed by: HOSPITALIST

## 2021-06-29 PROCEDURE — 93005 ELECTROCARDIOGRAM TRACING: CPT

## 2021-06-29 PROCEDURE — 36415 COLL VENOUS BLD VENIPUNCTURE: CPT

## 2021-06-29 PROCEDURE — 99219 PR INITIAL OBSERVATION CARE/DAY 50 MINUTES: CPT | Performed by: HOSPITALIST

## 2021-06-29 PROCEDURE — 85025 COMPLETE CBC W/AUTO DIFF WBC: CPT | Performed by: EMERGENCY MEDICINE

## 2021-06-29 PROCEDURE — 99285 EMERGENCY DEPT VISIT HI MDM: CPT | Performed by: EMERGENCY MEDICINE

## 2021-06-29 PROCEDURE — 99244 OFF/OP CNSLTJ NEW/EST MOD 40: CPT | Performed by: INTERNAL MEDICINE

## 2021-06-29 RX ORDER — NITROGLYCERIN 0.4 MG/1
0.4 TABLET SUBLINGUAL
Status: DISCONTINUED | OUTPATIENT
Start: 2021-06-29 | End: 2021-06-30 | Stop reason: HOSPADM

## 2021-06-29 RX ORDER — ASPIRIN 81 MG/1
243 TABLET, CHEWABLE ORAL ONCE
Status: COMPLETED | OUTPATIENT
Start: 2021-06-29 | End: 2021-06-29

## 2021-06-29 RX ORDER — CARVEDILOL 12.5 MG/1
12.5 TABLET ORAL 2 TIMES DAILY
Status: DISCONTINUED | OUTPATIENT
Start: 2021-06-29 | End: 2021-06-30 | Stop reason: HOSPADM

## 2021-06-29 RX ORDER — ASPIRIN 81 MG/1
81 TABLET ORAL DAILY
Status: DISCONTINUED | OUTPATIENT
Start: 2021-06-30 | End: 2021-06-30 | Stop reason: HOSPADM

## 2021-06-29 RX ORDER — ATORVASTATIN CALCIUM 40 MG/1
80 TABLET, FILM COATED ORAL DAILY
Status: DISCONTINUED | OUTPATIENT
Start: 2021-06-30 | End: 2021-06-30 | Stop reason: HOSPADM

## 2021-06-29 RX ADMIN — ASPIRIN 243 MG: 81 TABLET, CHEWABLE ORAL at 16:35

## 2021-06-29 RX ADMIN — CARVEDILOL 12.5 MG: 12.5 TABLET, FILM COATED ORAL at 22:05

## 2021-06-29 NOTE — CONSULTS
Consultation - Cardiology   Yanick Holliday 49 y o  male MRN: [id number]  Unit/Bed#: ED 06 Encounter: [id number]    Assessment:  Principal Problem:    Chest pain  Active Problems:    Hypertension    Benign essential hypertension    Coronary artery disease    Dyslipidemia    Atypical, sharp migrating chest pain  Not similar to his prior angina    Plan:    Recommend serial troponin  If these remain negative, I think he can be discharged home  I will not be able to arrange for a stress test tomorrow, but this does not need to be done as an inpatient  He will be able to have this done in the near future as an outpatient  Otherwise, will monitor his telemetry overnight for any arrhythmias  Continue his usual home medications  Will continue to follow with Dr Magui Giles after discharge  History of Present Illness   Physician Requesting Consult: Peggy Vasquez MD  Reason for Consult / Principal Problem: Chest pain  HPI: Yanick Holliday is a 49y o  year old male who presents with chest pain  He usually follows with Dr Magui Giles    he has a history of coronary disease and prior PCI on 2 occasions  2016 he had a stent to the LAD  April of 2017, he had stents to the circumflex and RCA  His LAD stent was patent at that time  Since that time, he has not required an ischemic evaluation  He has not had any recurrence of his chest pain  He comes to the emergency room today because he has been feeling a sharp, electrical like zing chest pain for last few days  He is not sure what it is related to, but because it is new and he has not had anything like this before, he came to the ER  This does not feel like his prior angina  So far, his EKG is unremarkable  His troponin was negative initially  He is not having any significant arrhythmias on telemetry  He does not feel like this  Is related to palpitations, he is not having any other associated symptoms        Usually reasonably active and denies any exertional symptoms  Consults    Review of Systems:  12 point ROS negative except as noted in HPI    Historical Information   Past Medical History:   Diagnosis Date    CAD (coronary artery disease)     Dyslipidemia     Ear problems     Hyperlipidemia     Hypertension     Obesity      Past Surgical History:   Procedure Laterality Date    ADENOIDECTOMY      COLONOSCOPY N/A 2/19/2019    Procedure: COLONOSCOPY;  Surgeon: Sharri Sigala MD;  [facility]; Service: Gastroenterology    CORONARY ANGIOPLASTY WITH STENT PLACEMENT      CORONARY ARTERY BYPASS GRAFT       Social History     Substance and Sexual Activity   Alcohol Use Yes    Comment: 2 per week     Social History     Substance and Sexual Activity   Drug Use No     Social History     Tobacco Use   Smoking Status Never Smoker   Smokeless Tobacco Never Used     Family History: non-contributory    Meds/Allergies     Current Facility-Administered Medications:     [START ON 6/30/2021] enoxaparin (LOVENOX) subcutaneous injection 40 mg, 40 mg, Subcutaneous, Daily, Eric Roy MD    Current Outpatient Medications:     amoxicillin (AMOXIL) 500 mg capsule, , Disp: , Rfl:     aspirin (ECOTRIN LOW STRENGTH) 81 mg EC tablet, Take 81 mg by mouth daily, Disp: , Rfl:     atorvastatin (LIPITOR) 80 mg tablet, TAKE 1 TABLET BY MOUTH EVERY DAY, Disp: 90 tablet, Rfl: 3    carvedilol (COREG) 12 5 mg tablet, TAKE 1 TABLET (12 5 MG TOTAL) BY MOUTH 2 (TWO) TIMES A DAY, Disp: 180 tablet, Rfl: 3    ciprofloxacin-dexamethasone (CIPRODEX) otic suspension, Instill 5 drops Left ear twice daily  (Patient not taking: Reported on 11/24/2020), Disp: 7 5 mL, Rfl: 1    nitroglycerin (NITROSTAT) 0 4 mg SL tablet, Place under the tongue  (Patient not taking: Reported on 6/21/2021), Disp: , Rfl:   No Known Allergies    Objective   Vitals: Blood pressure 158/83, pulse 80, temperature 98 3 °F (36 8 °C), temperature source Oral, resp  rate 18, SpO2 98 %  , There is no height or weight on file to calculate BMI ,   Orthostatic Blood Pressures      Most Recent Value   Blood Pressure  158/83 filed at 06/29/2021 1636   Patient Position - Orthostatic VS  Sitting filed at 06/29/2021 1636          No intake or output data in the 24 hours ending 06/29/21 1926    Invasive Devices     None                 Physical Exam:  GEN: Reggie Weston appears well, alert and oriented x 3, pleasant and cooperative   HEENT: pupils equal, round, and reactive to light; extraocular muscles intact  NECK: supple, no carotid bruits   HEART: regular rhythm, normal S1 and S2, no murmurs, clicks, gallops or rubs   LUNGS: clear to auscultation bilaterally; no wheezes, rales, or rhonchi   ABDOMEN: normal bowel sounds, soft, no tenderness, no distention  EXTREMITIES: peripheral pulses normal; no clubbing, cyanosis, or edema  NEURO: no focal findings   SKIN: normal without suspicious lesions on exposed skin    Lab Results:     Results from last 7 days   Lab Units 06/29/21  1752 06/29/21  1437   TROPONIN I ng/mL <0 02 <0 02     Results from last 7 days   Lab Units 06/29/21  1752 06/29/21  1437   WBC Thousand/uL  --  7 94   HEMOGLOBIN g/dL  --  15 6   HEMATOCRIT %  --  44 8   PLATELETS Thousands/uL 231 241         Results from last 7 days   Lab Units 06/29/21  1437   POTASSIUM mmol/L 4 1   CHLORIDE mmol/L 103   CO2 mmol/L 25   BUN mg/dL 18   CREATININE mg/dL 0 96   CALCIUM mg/dL 8 6   ALK PHOS U/L 101   ALT U/L 43   AST U/L 28               Imaging: I have personally reviewed pertinent films in PACS  No results found  EKG: Sinus rhythm, no ischemia    Telemetry: No significant arrhythmias seen

## 2021-06-29 NOTE — ED PROVIDER NOTES
History  Chief Complaint   Patient presents with    Chest Pain     patient c/o chest pain that started a few days ago that has been getting progressivly worse  Densie dizziness/sob/     HPI     61-year-old male, history of CAD, stents, dyslipidemia, hypertension presents emergency department for evaluation  He has had left-sided chest pain that is sharp, not reproducible, not worsened on exertion  Intermittent  Started several days ago, progressively gotten worse  No shortness of breath  No pain on exertion  Reports recent weight gain  Took 81 mg aspirin at home this morning, normal part of his regimen  Currently chest pain-free  Prior to Admission Medications   Prescriptions Last Dose Informant Patient Reported? Taking?   amoxicillin (AMOXIL) 500 mg capsule  Self Yes No   aspirin (ECOTRIN LOW STRENGTH) 81 mg EC tablet  Self Yes No   Sig: Take 81 mg by mouth daily   atorvastatin (LIPITOR) 80 mg tablet   No No   Sig: TAKE 1 TABLET BY MOUTH EVERY DAY   carvedilol (COREG) 12 5 mg tablet   No No   Sig: TAKE 1 TABLET (12 5 MG TOTAL) BY MOUTH 2 (TWO) TIMES A DAY   ciprofloxacin-dexamethasone (CIPRODEX) otic suspension  Self No No   Sig: Instill 5 drops Left ear twice daily  Patient not taking: Reported on 11/24/2020   nitroglycerin (NITROSTAT) 0 4 mg SL tablet  Self Yes No   Sig: Place under the tongue  Patient not taking: Reported on 6/21/2021      Facility-Administered Medications: None       Past Medical History:   Diagnosis Date    CAD (coronary artery disease)     Dyslipidemia     Ear problems     Hyperlipidemia     Hypertension     Obesity        Past Surgical History:   Procedure Laterality Date    ADENOIDECTOMY      COLONOSCOPY N/A 2/19/2019    Procedure: COLONOSCOPY;  Surgeon: Kelsie Marshall MD;  [facility];   Service: Gastroenterology    CORONARY ANGIOPLASTY WITH STENT PLACEMENT      CORONARY ARTERY BYPASS GRAFT         Family History   Problem Relation Age of Onset    Diabetes Mother         on the mother's side    Heart failure Maternal Grandmother     Heart attack Maternal Grandfather     Hypertension Family     Liver cancer Father     Stomach cancer Father     Hyperlipidemia Neg Hx     Anuerysm Neg Hx     Arrhythmia Neg Hx     Sudden death Neg Hx     Stroke Neg Hx     Clotting disorder Neg Hx      I have reviewed and agree with the history as documented  E-Cigarette/Vaping    E-Cigarette Use Never User      E-Cigarette/Vaping Substances    Nicotine No     THC No     CBD No      Social History     Tobacco Use    Smoking status: Never Smoker    Smokeless tobacco: Never Used   Vaping Use    Vaping Use: Never used   Substance Use Topics    Alcohol use: Yes     Comment: 2 per week    Drug use: No       Review of Systems   Cardiovascular: Positive for chest pain  All other systems reviewed and are negative  Physical Exam  Physical Exam  Vitals and nursing note reviewed  Constitutional:       General: He is not in acute distress  Appearance: He is well-developed  He is not diaphoretic  HENT:      Head: Normocephalic and atraumatic  Right Ear: External ear normal       Left Ear: External ear normal    Eyes:      General:         Right eye: No discharge  Left eye: No discharge  Pupils: Pupils are equal, round, and reactive to light  Neck:      Thyroid: No thyromegaly  Trachea: No tracheal deviation  Cardiovascular:      Rate and Rhythm: Normal rate and regular rhythm  Heart sounds: No murmur heard  Pulmonary:      Effort: Pulmonary effort is normal       Breath sounds: Normal breath sounds  Chest:      Chest wall: No tenderness or edema  Abdominal:      General: Bowel sounds are normal  There is no distension  Palpations: Abdomen is soft  Tenderness: There is no abdominal tenderness  Musculoskeletal:         General: No deformity  Normal range of motion        Cervical back: Normal range of motion and neck supple  Skin:     General: Skin is warm  Capillary Refill: Capillary refill takes less than 2 seconds  Neurological:      Mental Status: He is alert and oriented to person, place, and time  Cranial Nerves: No cranial nerve deficit  Motor: No abnormal muscle tone     Psychiatric:         Behavior: Behavior normal          Vital Signs  ED Triage Vitals   Temperature Pulse Respirations Blood Pressure SpO2   06/29/21 1426 06/29/21 1426 06/29/21 1426 06/29/21 1426 06/29/21 1426   98 3 °F (36 8 °C) 85 18 157/92 96 %      Temp Source Heart Rate Source Patient Position - Orthostatic VS BP Location FiO2 (%)   06/29/21 1426 06/29/21 1426 06/29/21 1426 06/29/21 1426 --   Oral Monitor Sitting Left arm       Pain Score       06/29/21 1636       No Pain           Vitals:    06/29/21 1426 06/29/21 1636   BP: 157/92 158/83   Pulse: 85 80   Patient Position - Orthostatic VS: Sitting Sitting         Visual Acuity      ED Medications  Medications   aspirin chewable tablet 243 mg (243 mg Oral Given 6/29/21 1635)       Diagnostic Studies  Results Reviewed     Procedure Component Value Units Date/Time    NT-BNP PRO [[id number]]     Lab Status: No result Specimen: Blood     Troponin I [[id number]]     Lab Status: No result Specimen: Blood     Comprehensive metabolic panel [[id number]] Collected: 06/29/21 1437    Lab Status: Final result Specimen: Blood from Arm, Right Updated: 06/29/21 1518     Sodium 138 mmol/L      Potassium 4 1 mmol/L      Chloride 103 mmol/L      CO2 25 mmol/L      ANION GAP 10 mmol/L      BUN 18 mg/dL      Creatinine 0 96 mg/dL      Glucose 106 mg/dL      Calcium 8 6 mg/dL      AST 28 U/L      ALT 43 U/L      Alkaline Phosphatase 101 U/L      Total Protein 7 8 g/dL      Albumin 3 8 g/dL      Total Bilirubin 0 93 mg/dL      eGFR 89 ml/min/1 73sq m     Narrative:      [place] guidelines for Chronic Kidney Disease (CKD):     Stage 1 with normal or high GFR (GFR > 90 mL/min/1 73 square meters)    Stage 2 Mild CKD (GFR = 60-89 mL/min/1 73 square meters)    Stage 3A Moderate CKD (GFR = 45-59 mL/min/1 73 square meters)    Stage 3B Moderate CKD (GFR = 30-44 mL/min/1 73 square meters)    Stage 4 Severe CKD (GFR = 15-29 mL/min/1 73 square meters)    Stage 5 End Stage CKD (GFR <15 mL/min/1 73 square meters)  Note: GFR calculation is accurate only with a steady state creatinine    Troponin I [[id number]]  (Normal) Collected: 06/29/21 1437    Lab Status: Final result Specimen: Blood from Arm, Right Updated: 06/29/21 1516     Troponin I <0 02 ng/mL     CBC and differential [[id number]] Collected: 06/29/21 1437    Lab Status: Final result Specimen: Blood from Arm, Right Updated: 06/29/21 1445     WBC 7 94 Thousand/uL      RBC 4 96 Million/uL      Hemoglobin 15 6 g/dL      Hematocrit 44 8 %      MCV 90 fL      MCH 31 5 pg      MCHC 34 8 g/dL      RDW 12 0 %      MPV 10 1 fL      Platelets 058 Thousands/uL      nRBC 0 /100 WBCs      Neutrophils Relative 69 %      Immat GRANS % 0 %      Lymphocytes Relative 20 %      Monocytes Relative 7 %      Eosinophils Relative 3 %      Basophils Relative 1 %      Neutrophils Absolute 5 53 Thousands/µL      Immature Grans Absolute 0 02 Thousand/uL      Lymphocytes Absolute 1 57 Thousands/µL      Monocytes Absolute 0 53 Thousand/µL      Eosinophils Absolute 0 24 Thousand/µL      Basophils Absolute 0 05 Thousands/µL                  XR chest 1 view portable   ED Interpretation by Micaela Verdin MD (06/29 5871)   No acute disease                   Procedures  ECG 12 Lead Documentation Only    Date/Time: 6/29/2021 2:37 PM  Performed by: Micaela Verdin MD  Authorized by: Micaela Verdin MD     Indications / Diagnosis:  Chest pain  ECG reviewed by me, the ED Provider: yes    Patient location:  ED  Interpretation:     Interpretation: normal    Rate:     ECG rate:  74    ECG rate assessment: normal    Rhythm:     Rhythm: sinus rhythm    Ectopy:     Ectopy: none    QRS:     QRS axis:  Normal    QRS intervals:  Normal  Conduction:     Conduction: normal    ST segments:     ST segments:  Normal  T waves:     T waves: normal               ED Course             HEART Risk Score      Most Recent Value   Heart Score Risk Calculator   History  1 Filed at: 06/29/2021 1701   ECG  0 Filed at: 06/29/2021 1701   Age  1 Filed at: 06/29/2021 1701   Risk Factors  2 Filed at: 06/29/2021 1701   Troponin  0 Filed at: 06/29/2021 1701   HEART Score  4 Filed at: 06/29/2021 1701                      SBIRT 22yo+      Most Recent Value   SBIRT (25 yo +)   In order to provide better care to our patients, we are screening all of our patients for alcohol and drug use  Would it be okay to ask you these screening questions? No Filed at: 06/29/2021 1614                    [facility]  Number of Diagnoses or Management Options  Chest pain: new and requires workup  Diagnosis management comments: Left-sided chest pain, intermittent with mostly atypical features  No dyspnea on exertion, no sweating, no shortness of breath  Does have multiple cardiac risk factors including previous stents  243 mg aspirin given ER  EKG with no acute ischemic findings  Initial troponin negative  Heart score 4, will need admit for CP observation  CXR unremarkable  Patient CP free  Discussed w/ slim who accepted patient for CP observation admission           Amount and/or Complexity of Data Reviewed  Clinical lab tests: ordered and reviewed  Tests in the radiology section of CPT®: ordered and reviewed  Tests in the medicine section of CPT®: ordered and reviewed  Discuss the patient with other providers: yes    Risk of Complications, Morbidity, and/or Mortality  Presenting problems: high  Diagnostic procedures: moderate  Management options: high    Patient Progress  Patient progress: stable      Disposition  Final diagnoses:   Chest pain     Time reflects when diagnosis was documented in both MDM as applicable and the Disposition within this note     Time User Action Codes Description Comment    6/29/2021  5:13 PM Jaqueline Trujillo Add [R07 9] Chest pain       ED Disposition     ED Disposition Condition Date/Time Comment    Admit Stable Tue Jun 29, 2021  5:13 PM Case was discussed with TRESSA and the patient's admission status was agreed to be Admission Status: observation status to the service of Dr Allan Mclean   Follow-up Information    None         Patient's Medications   Discharge Prescriptions    No medications on file     No discharge procedures on file      PDMP Review     None          ED Provider  Electronically Signed by           Simeon Mi MD  06/29/21

## 2021-06-29 NOTE — ASSESSMENT & PLAN NOTE
· Will need to rule out ACS  · Will draw serial troponins  · Will continue aspirin, Coreg and statin  · Will order echocardiogram  · Telemetry monitoring  · Consult cardiology placed

## 2021-06-29 NOTE — H&P
[facility]  H&P- [address] [facility] 1965, 49 y o  male MRN: [id number]  Unit/Bed#: ED 06 Encounter: [id number]  Primary Care Provider: Carmen Bergman MD   Date and time admitted to hospital: 6/29/2021  3:59 PM         [facility] Internal Medicine - History and Physical:       [address] [facility] 49 y o  male MRN: [id number]  Unit/Bed#: ED 06 Encounter: [id number]  Admitting Physician: Pat Torres MD  PCP: Carmen Bergman MD  Date of Admission:  06/29/21        Assessment and Plan:     * Chest pain  Assessment & Plan  · Will need to rule out ACS  · Will draw serial troponins  · Will continue aspirin, Coreg and statin  · Will order echocardiogram  · Telemetry monitoring  · Consult cardiology placed    Coronary artery disease  Assessment & Plan  · Same as above    Dyslipidemia  Assessment & Plan  · Continue statin    Benign essential hypertension  Assessment & Plan  · Continue Coreg    Hypertension  Assessment & Plan  · Continue Coreg            VTE Prophylaxis: Enoxaparin (Lovenox)  / sequential compression device   Code Status: full  Discussion with family:  Wife present at bedside    Anticipated Length of Stay:  Patient will be admitted on an Observation basis with an anticipated length of stay of  2 midnights  Justification for Hospital Stay:  Chest pain    Total Time for Visit, including Counseling / Coordination of Care: 30 minutes  Greater than 50% of this total time spent on direct patient counseling and coordination of care  Chief Complaint:   Chest pain    History of Present Illness:    [address] [facility] is a 49 y o  male who presented to the ED with complaints of chest pain for the last few days  Patient described it as a chest pain that is sharp any feels it both on the right and left side left-sided  The patient denied any diaphoresis/shortness of breath/nausea/vomiting/dizziness/lightheadedness  The patient is currently chest pain-free       Review of Systems:    Review of Systems   Constitutional: Negative  HENT: Negative  Eyes: Negative  Respiratory: Negative  Cardiovascular: Positive for chest pain  Gastrointestinal: Negative  Endocrine: Negative  Genitourinary: Negative  Musculoskeletal: Negative  Skin: Negative  Neurological: Negative  Hematological: Negative  Psychiatric/Behavioral: Negative  Past Medical and Surgical History:     Past Medical History:   Diagnosis Date    CAD (coronary artery disease)     Dyslipidemia     Ear problems     Hyperlipidemia     Hypertension     Obesity        Past Surgical History:   Procedure Laterality Date    ADENOIDECTOMY      COLONOSCOPY N/A 2/19/2019    Procedure: COLONOSCOPY;  Surgeon: Elvis Obrien MD;  Location[facility]; Service: Gastroenterology    CORONARY ANGIOPLASTY WITH STENT PLACEMENT      CORONARY ARTERY BYPASS GRAFT         Meds/Allergies:    Prior to Admission medications    Medication Sig Start Date End Date Taking? Authorizing Provider   amoxicillin (AMOXIL) 500 mg capsule  7/27/20   Historical Provider, MD   aspirin (ECOTRIN LOW STRENGTH) 81 mg EC tablet Take 81 mg by mouth daily    Historical Provider, MD   atorvastatin (LIPITOR) 80 mg tablet TAKE 1 TABLET BY MOUTH EVERY DAY 12/24/20   Dasha Moreau MD   carvedilol (COREG) 12 5 mg tablet TAKE 1 TABLET (12 5 MG TOTAL) BY MOUTH 2 (TWO) TIMES A DAY 12/24/20   Dasha Moreau MD   ciprofloxacin-dexamethasone (CIPRODEX) otic suspension Instill 5 drops Left ear twice daily  Patient not taking: Reported on 11/24/2020 8/5/20   Сергей Lawson PA-C   nitroglycerin (NITROSTAT) 0 4 mg SL tablet Place under the tongue  Patient not taking: Reported on 6/21/2021    Historical Provider, MD     I have reviewed home medications with patient personally      Allergies: No Known Allergies    Social History:     Marital Status: /Civil Union     Social History     Substance and Sexual Activity   Alcohol Use Yes    Comment: 2 per week Social History     Tobacco Use   Smoking Status Never Smoker   Smokeless Tobacco Never Used     Social History     Substance and Sexual Activity   Drug Use No       Family History:    non-contributory    Physical Exam:     Vitals:   Blood Pressure: 158/83 (06/29/21 1636)  Pulse: 80 (06/29/21 1636)  Temperature: 98 3 °F (36 8 °C) (06/29/21 1426)  Temp Source: Oral (06/29/21 1426)  Respirations: 18 (06/29/21 1636)  SpO2: 98 % (06/29/21 1636)    Physical Exam  Constitutional:       Appearance: Normal appearance  HENT:      Head: Normocephalic and atraumatic  Eyes:      Extraocular Movements: Extraocular movements intact  Pupils: Pupils are equal, round, and reactive to light  Cardiovascular:      Rate and Rhythm: Normal rate and regular rhythm  Pulmonary:      Effort: Pulmonary effort is normal       Breath sounds: Normal breath sounds  Abdominal:      General: Bowel sounds are normal       Palpations: Abdomen is soft  Musculoskeletal:         General: Normal range of motion  Cervical back: Neck supple  Skin:     General: Skin is warm and dry  Neurological:      Mental Status: He is alert and oriented to person, place, and time  Psychiatric:         Mood and Affect: Mood normal        Additional Data:     Lab Results: I have personally reviewed pertinent reports  Results from last 7 days   Lab Units 06/29/21  1437   WBC Thousand/uL 7 94   HEMOGLOBIN g/dL 15 6   HEMATOCRIT % 44 8   PLATELETS Thousands/uL 241   NEUTROS PCT % 69   LYMPHS PCT % 20   MONOS PCT % 7   EOS PCT % 3     Results from last 7 days   Lab Units 06/29/21  1437   SODIUM mmol/L 138   POTASSIUM mmol/L 4 1   CHLORIDE mmol/L 103   CO2 mmol/L 25   BUN mg/dL 18   CREATININE mg/dL 0 96   ANION GAP mmol/L 10   CALCIUM mg/dL 8 6   ALBUMIN g/dL 3 8   TOTAL BILIRUBIN mg/dL 0 93   ALK PHOS U/L 101   ALT U/L 43   AST U/L 28   GLUCOSE RANDOM mg/dL 106                       Imaging: I have personally reviewed pertinent reports  XR chest 1 view portable   ED Interpretation by Galina Robin MD (06/29 5193)   No acute disease  EKG, Pathology, and Other Studies Reviewed on Admission:   · EKG:  Sinus rhythm  No ST elevation/depressions noted    Allscripts / Epic Records Reviewed: Yes     ** Please Note: This note has been constructed using a voice recognition system   **

## 2021-06-30 ENCOUNTER — TELEPHONE (OUTPATIENT)
Dept: FAMILY MEDICINE CLINIC | Facility: OTHER | Age: 56
End: 2021-06-30

## 2021-06-30 ENCOUNTER — APPOINTMENT (OUTPATIENT)
Dept: NON INVASIVE DIAGNOSTICS | Facility: HOSPITAL | Age: 56
End: 2021-06-30
Payer: COMMERCIAL

## 2021-06-30 ENCOUNTER — TRANSITIONAL CARE MANAGEMENT (OUTPATIENT)
Dept: FAMILY MEDICINE CLINIC | Facility: OTHER | Age: 56
End: 2021-06-30

## 2021-06-30 VITALS
WEIGHT: 291.45 LBS | RESPIRATION RATE: 18 BRPM | SYSTOLIC BLOOD PRESSURE: 137 MMHG | HEIGHT: 74 IN | DIASTOLIC BLOOD PRESSURE: 89 MMHG | BODY MASS INDEX: 37.4 KG/M2 | OXYGEN SATURATION: 94 % | HEART RATE: 69 BPM | TEMPERATURE: 98.4 F

## 2021-06-30 LAB
ALBUMIN SERPL BCP-MCNC: 3.5 G/DL (ref 3.5–5)
ALP SERPL-CCNC: 88 U/L (ref 46–116)
ALT SERPL W P-5'-P-CCNC: 37 U/L (ref 12–78)
ANION GAP SERPL CALCULATED.3IONS-SCNC: 9 MMOL/L (ref 4–13)
AST SERPL W P-5'-P-CCNC: 26 U/L (ref 5–45)
BILIRUB SERPL-MCNC: 0.94 MG/DL (ref 0.2–1)
BUN SERPL-MCNC: 14 MG/DL (ref 5–25)
CALCIUM SERPL-MCNC: 8.7 MG/DL (ref 8.3–10.1)
CHLORIDE SERPL-SCNC: 105 MMOL/L (ref 100–108)
CO2 SERPL-SCNC: 26 MMOL/L (ref 21–32)
CREAT SERPL-MCNC: 0.92 MG/DL (ref 0.6–1.3)
ERYTHROCYTE [DISTWIDTH] IN BLOOD BY AUTOMATED COUNT: 12 % (ref 11.6–15.1)
GFR SERPL CREATININE-BSD FRML MDRD: 94 ML/MIN/1.73SQ M
GLUCOSE P FAST SERPL-MCNC: 99 MG/DL (ref 65–99)
GLUCOSE SERPL-MCNC: 99 MG/DL (ref 65–140)
HCT VFR BLD AUTO: 45.3 % (ref 36.5–49.3)
HGB BLD-MCNC: 14.9 G/DL (ref 12–17)
MAGNESIUM SERPL-MCNC: 2 MG/DL (ref 1.6–2.6)
MCH RBC QN AUTO: 30.5 PG (ref 26.8–34.3)
MCHC RBC AUTO-ENTMCNC: 32.9 G/DL (ref 31.4–37.4)
MCV RBC AUTO: 93 FL (ref 82–98)
PLATELET # BLD AUTO: 204 THOUSANDS/UL (ref 149–390)
PMV BLD AUTO: 10.3 FL (ref 8.9–12.7)
POTASSIUM SERPL-SCNC: 4 MMOL/L (ref 3.5–5.3)
PROT SERPL-MCNC: 7 G/DL (ref 6.4–8.2)
RBC # BLD AUTO: 4.88 MILLION/UL (ref 3.88–5.62)
SODIUM SERPL-SCNC: 140 MMOL/L (ref 136–145)
TSH SERPL DL<=0.05 MIU/L-ACNC: 2.15 UIU/ML (ref 0.36–3.74)
WBC # BLD AUTO: 7.31 THOUSAND/UL (ref 4.31–10.16)

## 2021-06-30 PROCEDURE — 99214 OFFICE O/P EST MOD 30 MIN: CPT | Performed by: INTERNAL MEDICINE

## 2021-06-30 PROCEDURE — 85027 COMPLETE CBC AUTOMATED: CPT | Performed by: HOSPITALIST

## 2021-06-30 PROCEDURE — 84443 ASSAY THYROID STIM HORMONE: CPT | Performed by: HOSPITALIST

## 2021-06-30 PROCEDURE — 80053 COMPREHEN METABOLIC PANEL: CPT | Performed by: HOSPITALIST

## 2021-06-30 PROCEDURE — 83735 ASSAY OF MAGNESIUM: CPT | Performed by: HOSPITALIST

## 2021-06-30 PROCEDURE — 99217 PR OBSERVATION CARE DISCHARGE MANAGEMENT: CPT | Performed by: INTERNAL MEDICINE

## 2021-06-30 RX ADMIN — ASPIRIN 81 MG: 81 TABLET ORAL at 08:20

## 2021-06-30 RX ADMIN — ATORVASTATIN CALCIUM 80 MG: 40 TABLET ORAL at 08:20

## 2021-06-30 RX ADMIN — CARVEDILOL 12.5 MG: 12.5 TABLET, FILM COATED ORAL at 08:20

## 2021-06-30 NOTE — DISCHARGE SUMMARY
[facility]  Discharge- [address] 1965, 49 y o  male MRN: [id number]  Unit/Bed#: S -01 Encounter: [id number]  Primary Care Provider: Sharon Wu MD   Date and time admitted to hospital: 6/29/2021  3:59 PM    * Chest pain  Assessment & Plan    · Serial troponins negative  · Continued home medications  · Telemetry unremarkable  · Outpatient cardiac stress test    Dyslipidemia  Assessment & Plan  · Continue statin    Coronary artery disease  Assessment & Plan  · Same as above  · Outpatient Stress test       Benign essential hypertension  Assessment & Plan  · Continue Coreg    Hypertension  Assessment & Plan  · Continue Coreg        Medical Problems     Resolved Problems  Date Reviewed: 6/21/2021        Resolved    * (Principal) Chest pain 6/30/2021     Resolved by  Herrera Rivera MD                Admission Date:   Admission Orders (From admission, onward)     Ordered        06/29/21 1713  Place in Observation  Once                     Admitting Diagnosis: Chest pain [R07 9]    HPI:  65-year-old male presented with chest pain for few days  Patient described the chest pain as sharp on both the right and left side  In the ER patient denied any diaphoresis shortness of breath, nausea vomiting dizziness or lightheadedness  Procedures Performed:   Orders Placed This Encounter   Procedures    ED ECG Documentation Only     Physical Exam  Constitutional:       Appearance: Normal appearance  He is obese  HENT:      Head: Normocephalic and atraumatic  Cardiovascular:      Rate and Rhythm: Normal rate and regular rhythm  Pulmonary:      Effort: Pulmonary effort is normal  No respiratory distress  Breath sounds: Normal breath sounds  Musculoskeletal:         General: Normal range of motion  Skin:     General: Skin is warm  Neurological:      General: No focal deficit present  Mental Status: He is alert     Psychiatric:         Mood and Affect: Mood normal          Behavior: Behavior normal          Summary of Hospital Course:  Patient was admitted for observation  No continue chest pain  Cardiology was consulted  Serial troponins were negative  Home medications were continued  No significant events on telemetry  Cardiology recommended outpatient stress test   Discharged on home medications  Patient will follow-up with cardiologist and PCP  Significant Findings, Care, Treatment and Services Provided:  Cardiology    Complications:  None    Condition at Discharge: good         Discharge instructions/Information to patient and family:   See after visit summary for information provided to patient and family  Provisions for Follow-Up Care:  See after visit summary for information related to follow-up care and any pertinent home health orders  PCP: Carmen Bergman MD    Disposition: Home    Planned Readmission: No      Discharge Medications:  See after visit summary for reconciled discharge medications provided to patient and family

## 2021-06-30 NOTE — PLAN OF CARE
Problem: PAIN - ADULT  Goal: Verbalizes/displays adequate comfort level or baseline comfort level  Description: Interventions:  - Encourage patient to monitor pain and request assistance  - Assess pain using appropriate pain scale  - Administer analgesics based on type and severity of pain and evaluate response  - Implement non-pharmacological measures as appropriate and evaluate response  - Consider cultural and social influences on pain and pain management  - Notify physician/advanced practitioner if interventions unsuccessful or patient reports new pain  Outcome: Progressing     Problem: CARDIOVASCULAR - ADULT  Goal: Maintains optimal cardiac output and hemodynamic stability  Description: INTERVENTIONS:  - Monitor I/O, vital signs and rhythm  - Monitor for S/S and trends of decreased cardiac output  - Administer and titrate ordered vasoactive medications to optimize hemodynamic stability  - Assess quality of pulses, skin color and temperature  - Assess for signs of decreased coronary artery perfusion  - Instruct patient to report change in severity of symptoms  Outcome: Progressing  Goal: Absence of cardiac dysrhythmias or at baseline rhythm  Description: INTERVENTIONS:  - Continuous cardiac monitoring, vital signs, obtain 12 lead EKG if ordered  - Administer antiarrhythmic and heart rate control medications as ordered  - Monitor electrolytes and administer replacement therapy as ordered  Outcome: Progressing

## 2021-06-30 NOTE — UTILIZATION REVIEW
Initial Clinical Review    Admission: Date/Time/Statement:   Admission Orders (From admission, onward)     Ordered        06/29/21 1713  Place in Observation  Once                   Orders Placed This Encounter   Procedures    Place in Observation     Standing Status:   Standing     Number of Occurrences:   1     Order Specific Question:   Level of Care     Answer:   Med Surg [16]     ED Arrival Information     Expected Arrival Acuity    - 6/29/2021 14:19 Urgent         Means of arrival Escorted by Service Admission type    [facility] General Medicine Urgent         Arrival complaint    hx of Chest pain/stents         Chief Complaint   Patient presents with    Chest Pain     patient c/o chest pain that started a few days ago that has been getting progressivly worse  Densie dizziness/sob/       Initial Presentation:  49 y o  male with hx CAD, HTN, dyslipidemia who presents to ED from home with complaints of chest pain for the last few days  Patient described it as a chest pain that is sharp any feels it both on the right and left side left-sided  On exam, pt chest pain free with normal lung sounds and reg heart rate and rhythm  Pt given ASA in ED  ECG and CXR unremarkable  Pt admitted as OBS to telemetry with chest pain, r/o ACS  Plan- telemetry, serial troponins, echo, cardiology consult, continue aspirin, Coreg and statin   Cardiology-recommend serial troponin and telemetry, pts chest pain atypical, not similar to his prior angina  Pt will be able to have stress test as outpatient-unable to arrange for 6/30 6/30 -per cardiology, pt doesn't need an echo  He had a few episodes of this sharp chest pain overnight  Telemetry has been unremarkable  Troponin is negative  ECG is unchanged      ED Triage Vitals   Temperature Pulse Respirations Blood Pressure SpO2   06/29/21 1426 06/29/21 1426 06/29/21 1426 06/29/21 1426 06/29/21 1426   98 3 °F (36 8 °C) 85 18 157/92 96 %      Temp Source Heart Rate Source Patient Position - Orthostatic VS BP Location FiO2 (%)   06/29/21 1426 06/29/21 1426 06/29/21 1426 06/29/21 1426 --   Oral Monitor Sitting Left arm       Pain Score       06/29/21 1636       No Pain          Wt Readings from Last 1 Encounters:   06/29/21 132 kg (291 lb 7 2 oz)     Additional Vital Signs:   Date/Time  Temp  Pulse  Resp  BP  MAP (mmHg)  SpO2    06/30/21 0741  98 4 °F (36 9 °C)  69  18  137/89  --  94 %    06/29/21 2201  --  --  --  140/100  --  --    06/29/21 2154  98 6 °F (37 °C)  72  18  165/107Abnormal   131  95 %    06/29/21 2028  --  83  18  168/83  --  97 %    06/29/21 1636  --  80  18  158/83  --  98 %        Pertinent Labs/Diagnostic Test Results:   6/29  ECG-Normal sinus rhythm  CXR-No acute cardiopulmonary disease        Results from last 7 days   Lab Units 06/30/21 0627 06/29/21  1752 06/29/21  1437   WBC Thousand/uL 7 31  --  7 94   HEMOGLOBIN g/dL 14 9  --  15 6   HEMATOCRIT % 45 3  --  44 8   PLATELETS Thousands/uL 204 231 241   NEUTROS ABS Thousands/µL  --   --  5 53         Results from last 7 days   Lab Units 06/30/21 0627 06/29/21  1437   SODIUM mmol/L 140 138   POTASSIUM mmol/L 4 0 4 1   CHLORIDE mmol/L 105 103   CO2 mmol/L 26 25   ANION GAP mmol/L 9 10   BUN mg/dL 14 18   CREATININE mg/dL 0 92 0 96   EGFR ml/min/1 73sq m 94 89   CALCIUM mg/dL 8 7 8 6   MAGNESIUM mg/dL 2 0  --      Results from last 7 days   Lab Units 06/30/21 0627 06/29/21  1437   AST U/L 26 28   ALT U/L 37 43   ALK PHOS U/L 88 101   TOTAL PROTEIN g/dL 7 0 7 8   ALBUMIN g/dL 3 5 3 8   TOTAL BILIRUBIN mg/dL 0 94 0 93         Results from last 7 days   Lab Units 06/30/21 0627 06/29/21  1437   GLUCOSE RANDOM mg/dL 99 106           Results from last 7 days   Lab Units 06/29/21 2039 06/29/21  1752 06/29/21  1437   TROPONIN I ng/mL <0 02 <0 02 <0 02             Results from last 7 days   Lab Units 06/30/21  0627   TSH 3RD GENERATON uIU/mL 2 145           Results from last 7 days   Lab Units 06/29/21  1437   NT-PRO BNP pg/mL 40           ED Treatment:   Medication Administration from 06/29/2021 1419 to 06/29/2021 2138       Date/Time Order Dose Route Action     06/29/2021 1635 aspirin chewable tablet 243 mg 243 mg Oral Given        Past Medical History:   Diagnosis Date    CAD (coronary artery disease)     Dyslipidemia     Ear problems     Hyperlipidemia     Hypertension     Obesity      Present on Admission:   Chest pain   Hypertension   Coronary artery disease   Benign essential hypertension   Dyslipidemia      Admitting Diagnosis: Chest pain [R07 9]  Age/Sex: 49 y o  male  Admission Orders:  Scheduled Medications:  aspirin, 81 mg, Oral, Daily  atorvastatin, 80 mg, Oral, Daily  carvedilol, 12 5 mg, Oral, BID  enoxaparin, 40 mg, Subcutaneous, Daily      Continuous IV Infusions:     PRN Meds:  nitroglycerin, 0 4 mg, Sublingual, Q5 Min PRN    telemetry  SCD's      IP CONSULT TO CARDIOLOGY    Network Utilization Review Department  ATTENTION: Please call with any questions or concerns to [phone] and carefully listen to the prompts so that you are directed to the right person  All voicemails are confidential   Terri Meyer all requests for admission clinical reviews, approved or denied determinations and any other requests to dedicated fax number below belonging to the campus where the patient is receiving treatment   List of dedicated fax numbers for the Facilities:  [address] DENIALS (Administrative/Medical Necessity) [phone]   [address] (Maternity/NICU/Pediatrics) [phone]   [address] [place] [address] 40 [phone] [facility] Zainab Vazquez [id number] [address] 284 [facility] [phone]   Cher Gaston 37 [address] [place] [address] [phone]

## 2021-06-30 NOTE — TELEPHONE ENCOUNTER
Please inform patient that he will be needing to see cardiology to discuss stress testing options for now given history of CAD  If he was given a referral that is good and if not then he will need to discuss that need during his visit with Dr Galloway next  Thanks

## 2021-06-30 NOTE — DISCHARGE INSTR - AVS FIRST PAGE
Dear Jacob Johnson,     It was our pleasure to care for you here at [place], [facility]  It is our hope that we were always able to exceed the expected standards for your care during your stay  You were hospitalized due to Chest pain  You were cared for on the 3rd floor by Dr Jose Wahl under the service of Bridget Graves MD with the [facility] who covers for your primary care physician (PCP), Gertrudis Brambila MD, while you were hospitalized  If you have any questions or concerns related to this hospitalization, you may contact us at [phone]  For follow up as well as any medication refills, we recommend that you follow up with your primary care physician  A registered nurse will reach out to you by phone within a few days after your discharge to answer any additional questions that you may have after going home  However, at this time we provide for you here, the most important instructions / recommendations at discharge:     · Notable Medication Adjustments -   · None   · Testing Required after Discharge -   · Outpatient Stress test   · Important follow up information -   · Schedule appointment with Cardiologist- Dr Jacqui Velazquez   · Please review this entire after visit summary as additional general instructions including medication list, appointments, activity, diet, any pertinent wound care, and other additional recommendations from your care team that may be provided for you        Sincerely,     Harman José MD

## 2021-06-30 NOTE — TELEPHONE ENCOUNTER
Patient has TCM appt scheduled with Dr Dm Goodrich next Friday  Patient states he was told by doctor at the hospital that he will need a stress test ordered by PCP  Pt would like to know if PCP able to order stress test prior to appt?

## 2021-06-30 NOTE — DISCHARGE INSTRUCTIONS
Chest Pain   WHAT YOU NEED TO KNOW:   Chest pain can be caused by a range of conditions, from not serious to life-threatening  Chest pain can be a symptom of a digestive problem, such as acid reflux or a stomach ulcer  An anxiety attack or a strong emotion, such as anger, can also cause chest pain  Infection, inflammation, or a fracture in the bones or cartilage in your chest can cause pain or discomfort  Sometimes chest pain or pressure is caused by poor blood flow to your heart (angina)  Chest pain may also be caused by life-threatening conditions such as a heart attack or blood clot in your lungs  DISCHARGE INSTRUCTIONS:   Call your local emergency number (911 in the [address] [facility]) or have someone call if:   · You have any of the following signs of a heart attack:      ? Squeezing, pressure, or pain in your chest    ? You may  also have any of the following:     § Discomfort or pain in your back, neck, jaw, stomach, or arm    § Shortness of breath    § Nausea or vomiting    § Lightheadedness or a sudden cold sweat      Return to the emergency department if:   · You have chest discomfort that gets worse, even with medicine  · You cough or vomit blood  · Your bowel movements are black or bloody  · You cannot stop vomiting, or it hurts to swallow  Call your doctor if:   · You have questions or concerns about your condition or care  Medicines:   · Medicines  may be given to treat the cause of your chest pain  Examples include pain medicine, anxiety medicine, or medicines to increase blood flow to your heart  · Do not take certain medicines without asking your healthcare provider first   These include NSAIDs, herbal or vitamin supplements, or hormones (estrogen or progestin)  · Take your medicine as directed  Contact your healthcare provider if you think your medicine is not helping or if you have side effects  Tell him or her if you are allergic to any medicine   Keep a list of the medicines, vitamins, and herbs you take  Include the amounts, and when and why you take them  Bring the list or the pill bottles to follow-up visits  Carry your medicine list with you in case of an emergency  Healthy living tips: The following are general healthy guidelines  If the cause of your chest pain is known, your healthcare provider will give you specific guidelines to follow  · Do not smoke  Nicotine and other chemicals in cigarettes and cigars can cause lung and heart damage  Ask your healthcare provider for information if you currently smoke and need help to quit  E-cigarettes or smokeless tobacco still contain nicotine  Talk to your healthcare provider before you use these products  · Choose a variety of healthy foods as often as possible  Include fresh, frozen, or canned fruits and vegetables  Also include low-fat dairy products, fish, chicken (without skin), and lean meats  Your healthcare provider or a dietitian can help you create meal plans  You may need to avoid certain foods or drinks if your pain is caused by a digestion problem  · Lower your sodium (salt) intake  Limit foods that are high in sodium, such as canned foods, salty snacks, and cold cuts  If you add salt when you cook food, do not add more at the table  Choose low-sodium canned foods as much as possible  · Drink plenty of water every day  Water helps your body to control your temperature and blood pressure  Ask your healthcare provider how much water you should drink every day  · Ask about activity  Your healthcare provider will tell you which activities to limit or avoid  Ask when you can drive, return to work, and have sex  Ask about the best exercise plan for you  · Maintain a healthy weight  Ask your healthcare provider what a healthy weight is for you  Ask him or her to help you create a safe weight loss plan if you are overweight  · Ask about vaccines you may need    Get the influenza (flu) vaccine every year as soon as recommended, usually in September or October  You may also need a pneumococcal vaccine to prevent pneumonia  The vaccine is usually given every 5 years, starting at age 72  Your healthcare provider can tell you if should get other vaccines, and when to get them  Follow up with your healthcare provider within 72 hours, or as directed: You may need to return for more tests to find the cause of your chest pain  You may be referred to a specialist, such as a cardiologist or gastroenterologist  Write down your questions so you remember to ask them during your visits  © Copyright [employer] 2021 Information is for End User's use only and may not be sold, redistributed or otherwise used for commercial purposes  All illustrations and images included in CareNotes® are the copyrighted property of [employer]  or Eric Burton  The above information is an  only  It is not intended as medical advice for individual conditions or treatments  Talk to your doctor, nurse or pharmacist before following any medical regimen to see if it is safe and effective for you

## 2021-06-30 NOTE — PROGRESS NOTES
Progress Note - Cardiology   Felix Hermosillo 49 y o  male MRN: [id number]  Unit/Bed#: S -01 Encounter: [id number]    Assessment:  Atypical chest pain  He does have a history of coronary artery disease, but his current symptoms are not similar to his prior angina  Troponin negative, ECG unremarkable  Plan:    Okay for discharge home  I think the patient warrants an outpatient stress test   Iron offered to arrange this for the next few days based on the observation status, but the patient tells me that he is going on vacation today and therefore I will send a message to the office and have this ordered by his primary cardiologist to schedule in the next few weeks  He does not need an echocardiogram   No changes medications, okay for discharge home  Discussed with primary team     Subjective/Objective     Subjective:     He had a few episodes of this sharp chest pain overnight  Telemetry has been unremarkable  Troponin is negative  ECG is unchanged  Objective:    Vitals: /89 (BP Location: Left arm)   Pulse 69   Temp 98 4 °F (36 9 °C) (Oral)   Resp 18   Ht 6' 2" (1 88 m)   Wt 132 kg (291 lb 7 2 oz)   SpO2 94%   BMI 37 42 kg/m²   Vitals:    06/29/21 2154   Weight: 132 kg (291 lb 7 2 oz)     Orthostatic Blood Pressures      Most Recent Value   Blood Pressure  137/89 filed at 06/30/2021 9963   Patient Position - Orthostatic VS  Sitting filed at 06/30/2021 0741            Intake/Output Summary (Last 24 hours) at 6/30/2021 1017  Last data filed at 6/30/2021 0900  Gross per 24 hour   Intake 300 ml   Output --   Net 300 ml       Physical Exam:  General appearance: alert and in no acute distress  Head: Normocephalic, without obvious abnormality, atraumatic  Neck: no carotid bruit, no JVD and supple, symmetrical, trachea midline  Lungs: clear to auscultation bilaterally  Normal air entry  Normal effort  Heart: S1, S2 normal and no S3 or S4  No murmurs    Abdomen: soft, non-tender; bowel sounds normal; no masses,  no organomegaly  Extremities: extremities normal, atraumatic, no cyanosis or edema  Pulses: 2+ and symmetric bilaterally  Skin: Skin color, texture, turgor normal  No rashes or lesions  Neurologic: Grossly normal  Alert and oriented  Medications:    Current Facility-Administered Medications:     aspirin (ECOTRIN LOW STRENGTH) EC tablet 81 mg, 81 mg, Oral, Daily, Yessenia Koch MD, 81 mg at 06/30/21 0820    atorvastatin (LIPITOR) tablet 80 mg, 80 mg, Oral, Daily, Yessenia Koch MD, 80 mg at 06/30/21 0820    carvedilol (COREG) tablet 12 5 mg, 12 5 mg, Oral, BID, Yessenia Koch MD, 12 5 mg at 06/30/21 0820    enoxaparin (LOVENOX) subcutaneous injection 40 mg, 40 mg, Subcutaneous, Daily, Yessenia Koch MD    nitroglycerin (NITROSTAT) SL tablet 0 4 mg, 0 4 mg, Sublingual, Q5 Min PRN, Yessenia Koch MD    Lab Results:  Results from last 7 days   Lab Units 06/29/21 2039 06/29/21  1752 06/29/21  1437   TROPONIN I ng/mL <0 02 <0 02 <0 02     Results from last 7 days   Lab Units 06/30/21  0627 06/29/21  1752 06/29/21  1437   WBC Thousand/uL 7 31  --  7 94   HEMOGLOBIN g/dL 14 9  --  15 6   HEMATOCRIT % 45 3  --  44 8   PLATELETS Thousands/uL 204 231 241         Results from last 7 days   Lab Units 06/30/21  0627 06/29/21  1437   SODIUM mmol/L 140 138   POTASSIUM mmol/L 4 0 4 1   CHLORIDE mmol/L 105 103   CO2 mmol/L 26 25   BUN mg/dL 14 18   CREATININE mg/dL 0 92 0 96   CALCIUM mg/dL 8 7 8 6   ALK PHOS U/L 88 101   ALT U/L 37 43   AST U/L 26 28         Results from last 7 days   Lab Units 06/30/21  0627   MAGNESIUM mg/dL 2 0       Telemetry: Personally reviewed  No significant arrhythmias

## 2021-06-30 NOTE — TELEPHONE ENCOUNTER
Left message for pt to cb  FYI  Pt is seeing Dr Beth Nolan (Cardiology) he can f/u with them regarding stress test prior to TCM visit with Dr Franco Carrel next Friday

## 2021-06-30 NOTE — ASSESSMENT & PLAN NOTE
· Serial troponins negative  · Continued home medications  · Telemetry unremarkable  · Outpatient cardiac stress test

## 2021-07-02 ENCOUNTER — RA CDI HCC (OUTPATIENT)
Dept: OTHER | Facility: HOSPITAL | Age: 56
End: 2021-07-02

## 2021-07-02 ENCOUNTER — TELEPHONE (OUTPATIENT)
Dept: CARDIOLOGY CLINIC | Facility: CLINIC | Age: 56
End: 2021-07-02

## 2021-07-02 DIAGNOSIS — I25.10 CORONARY ARTERY DISEASE, UNSPECIFIED VESSEL OR LESION TYPE, UNSPECIFIED WHETHER ANGINA PRESENT, UNSPECIFIED WHETHER NATIVE OR TRANSPLANTED HEART: ICD-10-CM

## 2021-07-02 DIAGNOSIS — R07.9 CHEST PAIN, UNSPECIFIED TYPE: Primary | ICD-10-CM

## 2021-07-02 NOTE — TELEPHONE ENCOUNTER
Pt recently in ED for CP  Dr Basim Good offered to order stress testing, but pt declined because he was going on vacation  He called cardiology asking for an order  What type of stress do you recommend?

## 2021-07-02 NOTE — PROGRESS NOTES
[place] [address]  coding opportunities             Chart reviewed, (number of) suggestions sent to provider: 2            Number of suggestions actually used: 0      Number of suggestions NOT actually used: 2     Patients insurance company: [employer] (Medicare Advantage and Commercial)     Visit status: Patient arrived for their scheduled appointment     Provider never responded to [place] [address]  coding request     [place] [address]  coding opportunities             Chart reviewed, (number of) suggestions sent to provider: 2                  Patients insurance company: [id number] [place] [address] (Medicare Advantage and Commercial)           1) Found on active problem list-         I21 11 Acute ST elevation myocardial infarction (STEMI) involving right coronary artery ([place] [address] )        I21 4 Non-ST elevation MI (NSTEMI) ([place] [address] )                Please assess current status and document for 2021 billing        As per CMS/ICD Guidelines - Aftercare code to be used if pt still receiving care related to MI  If healed, I25 2 Old Myocardial infarction may be appropriate     2)   BMI>35   E66 01- Morbid (severe) obesity due to excess calories ([place] [address] )    Z68  35- Z68 --  - Body mass index (BMI), adult  (Pick one from the Z68 35 - Y53 --)           Per CMS/ICD 10 coding guidelines, to be used when BMI > 35 & <40 with one or more comorbidity (HTN, CAD)

## 2021-07-09 ENCOUNTER — OFFICE VISIT (OUTPATIENT)
Dept: FAMILY MEDICINE CLINIC | Facility: OTHER | Age: 56
End: 2021-07-09
Payer: COMMERCIAL

## 2021-07-09 VITALS
DIASTOLIC BLOOD PRESSURE: 80 MMHG | HEIGHT: 74 IN | OXYGEN SATURATION: 97 % | SYSTOLIC BLOOD PRESSURE: 118 MMHG | TEMPERATURE: 97.6 F | BODY MASS INDEX: 39.61 KG/M2 | RESPIRATION RATE: 16 BRPM | WEIGHT: 308.6 LBS | HEART RATE: 66 BPM

## 2021-07-09 DIAGNOSIS — R07.89 OTHER CHEST PAIN: Primary | ICD-10-CM

## 2021-07-09 DIAGNOSIS — Z11.4 SCREENING FOR HIV (HUMAN IMMUNODEFICIENCY VIRUS): ICD-10-CM

## 2021-07-09 DIAGNOSIS — Z23 ENCOUNTER FOR IMMUNIZATION: ICD-10-CM

## 2021-07-09 DIAGNOSIS — Z11.59 NEED FOR HEPATITIS C SCREENING TEST: ICD-10-CM

## 2021-07-09 PROCEDURE — 90715 TDAP VACCINE 7 YRS/> IM: CPT

## 2021-07-09 PROCEDURE — 99495 TRANSJ CARE MGMT MOD F2F 14D: CPT | Performed by: FAMILY MEDICINE

## 2021-07-09 PROCEDURE — 90471 IMMUNIZATION ADMIN: CPT

## 2021-07-09 PROCEDURE — 1111F DSCHRG MED/CURRENT MED MERGE: CPT | Performed by: FAMILY MEDICINE

## 2021-07-09 NOTE — PROGRESS NOTES
Assessment/Plan:    Chest pain  Hx of MI/CAD s/p stenting  This pain is different  Normal workup in the hospital  On appropriate medications  No changes made  · Outpatient stress test  · F/u with cardiologist       Diagnoses and all orders for this visit:    Other chest pain    Need for hepatitis C screening test  -     Hepatitis C Antibody ([facility]); Future    Screening for HIV (human immunodeficiency virus)  -     HIV 1/2 Antigen/Antibody (4th Generation) w Reflex [facility]; Future    Encounter for immunization  -     Tdap Vaccine greater than or equal to 6yo          Nutrition Assessment and Intervention:     Online resources such as Nutrition[employer]  [employer], [employer], [employer]rm  [employer], or similar provided to patient        Subjective:      Patient ID: Davida Dahl is a 49 y o  male  TCM Call (since 6/9/2021)     Date and time call was made  6/30/2021  1:18 PM    Hospital care reviewed  Records reviewed    Patient was hospitialized at  [facility]        Date of Admission  06/29/21    Date of discharge  06/30/21    Diagnosis  Chest pain    Disposition  Home    Were the patients medications reviewed and updated  Yes    Current Symptoms  None      TCM Call (since 6/9/2021)     Post hospital issues  None    Should patient be enrolled in anticoag monitoring? No    Scheduled for follow up? Yes    Did you obtain your prescribed medications  Yes    Do you need help managing your prescriptions or medications  No    Is transportation to your appointment needed  No    I have advised the patient to call PCP with any new or worsening symptoms  Nikky Novak MA 6/30/21            Pt presents for follow up after hospitalization 6/29-6/30 due to chest pain  He was found to have normal troponin, BNP, and EKG   After being monitored overnight he was discharged with a scheduled outpatient stress test  The pt reports he was having a mild "zinging" pain in his chest and arms that he describes as an annoying burning/shooting pain  He says it has been occurring for about 1 month but was becoming more frequent so he went to the hospital  He states that he is still having this intermittently, but it has become less frequent since discharge  He notes that he has gained weight over the last year and believes this may be contributing  Review of Systems   Constitutional: Negative for chills, fever and unexpected weight change  HENT: Negative for congestion, rhinorrhea and sore throat  Eyes: Negative for visual disturbance  Respiratory: Negative for cough and shortness of breath  Cardiovascular: Positive for chest pain  Negative for palpitations and leg swelling  Gastrointestinal: Negative for abdominal pain, blood in stool, constipation, diarrhea, nausea and vomiting  Genitourinary: Negative for difficulty urinating, dysuria and hematuria  Musculoskeletal: Negative for arthralgias and myalgias  Skin: Negative for rash  Neurological: Negative for dizziness, light-headedness and headaches  Psychiatric/Behavioral: Negative for dysphoric mood  All other systems reviewed and are negative  Objective:      /80   Pulse 66   Temp 97 6 °F (36 4 °C)   Resp 16   Ht 6' 2" (1 88 m)   Wt (!) 140 kg (308 lb 9 6 oz)   SpO2 97%   BMI 39 62 kg/m²          Physical Exam  Vitals reviewed  Constitutional:       General: He is not in acute distress  Appearance: He is well-developed  He is obese  He is not diaphoretic  HENT:      Head: Normocephalic and atraumatic  Eyes:      Conjunctiva/sclera: Conjunctivae normal    Cardiovascular:      Rate and Rhythm: Normal rate and regular rhythm  Pulses: Normal pulses  Heart sounds: Normal heart sounds  No murmur heard  No friction rub  No gallop  Pulmonary:      Effort: Pulmonary effort is normal  No respiratory distress  Breath sounds: Normal breath sounds  No stridor  No wheezing, rhonchi or rales     Abdominal: General: Bowel sounds are normal  There is no distension  Palpations: Abdomen is soft  There is no mass  Tenderness: There is no abdominal tenderness  There is no guarding  Musculoskeletal:         General: Normal range of motion  Cervical back: Normal range of motion and neck supple  Right lower leg: No edema  Left lower leg: No edema  Skin:     General: Skin is warm and dry  Findings: No rash  Neurological:      General: No focal deficit present  Mental Status: He is alert and oriented to person, place, and time     Psychiatric:         Mood and Affect: Mood normal          Behavior: Behavior normal

## 2021-07-09 NOTE — PATIENT INSTRUCTIONS
exactly as directed  These medicines make it more likely for you to bleed or bruise  If you are told to take aspirin, do not take acetaminophen or ibuprofen instead  · Blood thinners  keep clots from forming in your blood  Clots may cause heart attacks, strokes, or death  This medicine makes it more likely for you to bleed or bruise  · Do not take certain medicines without asking your healthcare provider first   These include NSAIDs, herbal or vitamin supplements, or hormones (estrogen or progestin)  Procedures used to treat CAD:   · Angioplasty  may be done to open an artery blocked by plaque  A tube with a balloon on the end is threaded into the blocked artery  Once the tube is in the artery, the balloon is inflated  As the balloon inflates, it presses the plaque against the artery wall to open the artery  A stent may be placed in your artery to keep it open  · Coronary artery bypass graft surgery (CABG)  is open heart surgery  Healthcare providers take arteries or veins from other areas in your body and use them to bypass or go around the blocked arteries of your heart  Cardiac rehabilitation:  Your healthcare provider may recommend that you attend cardiac rehabilitation (rehab)  This is a program run by specialists who will help you safely strengthen your heart and reduce the risk for more heart disease  The plan includes exercise, relaxation, stress management, and heart-healthy nutrition  Healthcare providers will also check to make sure any medicines you are taking are working  Manage CAD to prevent a heart attack:   · Do not smoke  Nicotine and other chemicals in cigarettes and cigars can cause heart and lung damage  Ask your healthcare provider for information if you currently smoke and need help to quit  E-cigarettes or smokeless tobacco still contain nicotine  Talk to your healthcare provider before you use these products  · Exercise regularly    Exercise at least 30 minutes each day, on most days of the week  Exercise helps to lower high cholesterol and high blood pressure  It can also help you maintain a healthy weight  Ask your healthcare provider about the kind of exercise you should do and how to get started  · Maintain a healthy weight  If you are overweight, talk to your healthcare provider about how to lose weight  A weight loss of 10% can improve your heart health  · Eat heart-healthy foods  Include fresh fruits and vegetables in your meal plan  Choose low-fat foods, such as skim or 1% fat milk, low-fat cheese and yogurt, fish, chicken (without skin), and lean meats  Eat two 4-ounce servings of fish high in omega-3 fats each week, such as salmon, fresh tuna, and herring  Do not eat foods that are high in sodium, such as canned foods, potato chips, salty snacks, and cold cuts  Put less table salt on your food  · Limit or do not drink alcohol  A drink of alcohol is 12 ounces of beer, 5 ounces of wine, or 1½ ounces of liquor  · Manage other health conditions  Follow your healthcare provider's advice on how to manage other conditions that can affect your heart health  These include diabetes, high blood pressure, and high cholesterol  You may need to take medicines for these conditions and make other lifestyle changes  · Ask if you should have a flu vaccine  The flu can be dangerous for a person who has CAD  The flu vaccine is available every year in the fall  Follow up with your healthcare provider as directed: You may need to return for other tests  You may also be referred to a cardiac surgeon  Write down your questions so you remember to ask them during your visits  © Copyright [employer] 2021 Information is for End User's use only and may not be sold, redistributed or otherwise used for commercial purposes   All illustrations and images included in CareNotes® are the copyrighted property of [employer]  or Eric Burton  The above information is an  only  It is not intended as medical advice for individual conditions or treatments  Talk to your doctor, nurse or pharmacist before following any medical regimen to see if it is safe and effective for you

## 2021-07-10 NOTE — ASSESSMENT & PLAN NOTE
Hx of MI/CAD s/p stenting  This pain is different  Normal workup in the hospital  On appropriate medications  No changes made    · Outpatient stress test  · F/u with cardiologist

## 2021-07-22 ENCOUNTER — HOSPITAL ENCOUNTER (OUTPATIENT)
Dept: NON INVASIVE DIAGNOSTICS | Facility: HOSPITAL | Age: 56
Discharge: HOME | End: 2021-07-22
Payer: COMMERCIAL

## 2021-07-22 DIAGNOSIS — I25.10 CORONARY ARTERY DISEASE, UNSPECIFIED VESSEL OR LESION TYPE, UNSPECIFIED WHETHER ANGINA PRESENT, UNSPECIFIED WHETHER NATIVE OR TRANSPLANTED HEART: ICD-10-CM

## 2021-07-22 DIAGNOSIS — R07.9 CHEST PAIN, UNSPECIFIED TYPE: ICD-10-CM

## 2021-07-22 PROCEDURE — 93350 STRESS TTE ONLY: CPT

## 2021-07-22 PROCEDURE — 93351 STRESS TTE COMPLETE: CPT

## 2021-07-23 LAB
CHEST PAIN STATEMENT: NORMAL
MAX DIASTOLIC BP: 70 MMHG
MAX HEART RATE: 139 BPM
MAX PREDICTED HEART RATE: 166 BPM
MAX. SYSTOLIC BP: 210 MMHG
PROTOCOL NAME: NORMAL
REASON FOR TERMINATION: NORMAL
TARGET HR FORMULA: NORMAL
TEST INDICATION: NORMAL
TIME IN EXERCISE PHASE: NORMAL

## 2021-07-26 ENCOUNTER — TELEPHONE (OUTPATIENT)
Dept: CARDIOLOGY CLINIC | Facility: CLINIC | Age: 56
End: 2021-07-26

## 2021-07-26 NOTE — TELEPHONE ENCOUNTER
MD Missy García  Cardiology [facility]  I personally reviewed the study  Overall it looks good  The ekg changes mentioned are still considered normal      FYI  I called & gerber/Rafita, advised what you said  He wants an appt to discuss this with you     Transferred to schedulers

## 2021-09-07 ENCOUNTER — OFFICE VISIT (OUTPATIENT)
Dept: CARDIOLOGY CLINIC | Facility: CLINIC | Age: 56
End: 2021-09-07
Payer: COMMERCIAL

## 2021-09-07 VITALS
HEIGHT: 74 IN | WEIGHT: 286.9 LBS | DIASTOLIC BLOOD PRESSURE: 82 MMHG | BODY MASS INDEX: 36.82 KG/M2 | OXYGEN SATURATION: 98 % | HEART RATE: 73 BPM | SYSTOLIC BLOOD PRESSURE: 136 MMHG

## 2021-09-07 DIAGNOSIS — I25.10 CORONARY ARTERY DISEASE, UNSPECIFIED VESSEL OR LESION TYPE, UNSPECIFIED WHETHER ANGINA PRESENT, UNSPECIFIED WHETHER NATIVE OR TRANSPLANTED HEART: Primary | ICD-10-CM

## 2021-09-07 PROCEDURE — 99214 OFFICE O/P EST MOD 30 MIN: CPT | Performed by: INTERNAL MEDICINE

## 2021-09-07 PROCEDURE — 3008F BODY MASS INDEX DOCD: CPT | Performed by: INTERNAL MEDICINE

## 2021-09-07 PROCEDURE — 1036F TOBACCO NON-USER: CPT | Performed by: INTERNAL MEDICINE

## 2021-09-07 NOTE — PROGRESS NOTES
Cardiology Follow Up    Dorina Baltazarcci  1965  [id number]  [address] [facility] [place]  [address]  [address]  [place] [id number] [phone]    1  Coronary artery disease, unspecified vessel or lesion type, unspecified whether angina present, unspecified whether native or transplanted heart  Lipid Panel with Direct LDL reflex    Comprehensive metabolic panel       Interval History: Followup for CAD    He recently had very atypical chest pain  He has no exertional angina   His stress testing was reviewed which was normal      Medical Problems     Problem List     Chest pain    Hypertension    NSTEMI (non-ST elevated myocardial infarction) (HCC)    Acute ST elevation myocardial infarction (STEMI) involving right coronary artery (HCC)    Arm pain    Atherosclerosis    Benign essential hypertension    Coronary artery disease    Coronary artery disease involving native coronary artery of native heart without angina pectoris    Dyslipidemia    External hemorrhoid, thrombosed    Fatigue    Fatty liver    Injury, forearm    Lipid disorder    Non-ST elevation (NSTEMI) myocardial infarction (HCC)    Obesity    Plantar warts    Screening for colon cancer    Right upper quadrant abdominal pain    Elevated BP without diagnosis of hypertension    Bilateral impacted cerumen              Past Medical History:   Diagnosis Date    CAD (coronary artery disease)     Dyslipidemia     Ear problems     Hyperlipidemia     Hypertension     Obesity      Social History     Socioeconomic History    Marital status: /Civil Union     Spouse name: Not on file    Number of children: Not on file    Years of education: Not on file    Highest education level: Not on file   Occupational History    Occupation:    Tobacco Use    Smoking status: Never Smoker    Smokeless tobacco: Never Used   Vaping Use    Vaping Use: Never used   Substance and Sexual Activity    Alcohol use: Yes     Comment: 2 per week    Drug use: No    Sexual activity: Not on file   Other Topics Concern    Not on file   Social History Narrative    Not on file     Social Determinants of Health     Financial Resource Strain:     Difficulty of Paying Living Expenses:    Food Insecurity:     Worried About Running Out of Food in the Last Year:     [address] in the Last Year:    Transportation Needs:     Lack of Transportation (Medical):  Lack of Transportation (Non-Medical):    Physical Activity:     Days of Exercise per Week:     Minutes of Exercise per Session:    Stress:     Feeling of Stress :    Social Connections:     Frequency of Communication with Friends and Family:     Frequency of Social Gatherings with Friends and Family:     Attends [employer] Services:     Active Member of Clubs or Organizations:     Attends Club or Organization Meetings:     Marital Status:    Intimate Partner Violence:     Fear of Current or Ex-Partner:     Emotionally Abused:     Physically Abused:     Sexually Abused:       Family History   Problem Relation Age of Onset    Diabetes Mother         on the mother's side    Liver cancer Father     Stomach cancer Father     Heart failure Maternal Grandmother     Heart attack Maternal Grandfather     Hypertension Family     Hyperlipidemia Neg Hx     Anuerysm Neg Hx     Arrhythmia Neg Hx     Sudden death Neg Hx     Stroke Neg Hx     Clotting disorder Neg Hx      Past Surgical History:   Procedure Laterality Date    ADENOIDECTOMY      COLONOSCOPY N/A 2/19/2019    Procedure: COLONOSCOPY;  Surgeon: Jordan Simental MD;  [facility];   Service: Gastroenterology    CORONARY ANGIOPLASTY WITH STENT PLACEMENT      CORONARY ARTERY BYPASS GRAFT         Current Outpatient Medications:     aspirin (ECOTRIN LOW STRENGTH) 81 mg EC tablet, Take 81 mg by mouth daily, Disp: , Rfl:     atorvastatin (LIPITOR) 80 mg tablet, TAKE 1 TABLET BY MOUTH EVERY DAY, Disp: 90 tablet, Rfl: 3    carvedilol (COREG) 12 5 mg tablet, TAKE 1 TABLET (12 5 MG TOTAL) BY MOUTH 2 (TWO) TIMES A DAY, Disp: 180 tablet, Rfl: 3    nitroglycerin (NITROSTAT) 0 4 mg SL tablet, Place under the tongue , Disp: , Rfl:   No Known Allergies    Labs:     Chemistry        Component Value Date/Time     05/02/2017 0803     05/02/2017 0803    K 4 0 06/30/2021 0627    K 4 1 12/27/2018 0828     06/30/2021 0627     12/27/2018 0828    CO2 26 06/30/2021 0627    CO2 27 12/27/2018 0828    BUN 14 06/30/2021 0627    BUN 16 12/27/2018 0828    CREATININE 0 92 06/30/2021 0627    CREATININE 1 00 05/02/2017 0803    CREATININE 1 00 05/02/2017 0803        Component Value Date/Time    CALCIUM 8 7 06/30/2021 0627    CALCIUM 9 1 12/27/2018 0828    ALKPHOS 88 06/30/2021 0627    ALKPHOS 80 12/27/2018 0828    AST 26 06/30/2021 0627    AST 23 12/27/2018 0828    ALT 37 06/30/2021 0627    ALT 25 12/27/2018 0828    BILITOT 0 9 04/22/2017 0726            Lab Results   Component Value Date    CHOL 124 (L) 01/26/2017    CHOL 203 (H) 03/24/2016     Lab Results   Component Value Date    HDL 49 06/18/2018    HDL 51 04/24/2017    HDL 47 01/26/2017     Lab Results   Component Value Date    LDLCALC 57 06/18/2018    LDLCALC 46 04/24/2017    LDLCALC 115 (H) 03/29/2016     Lab Results   Component Value Date    TRIG 93 06/18/2018    TRIG 66 04/24/2017    TRIG 103 01/26/2017     No results found for: CHOLHDL    Imaging: No results found  Review of Systems   Constitutional: Negative  HENT: Negative  Eyes: Negative  Cardiovascular: Negative  Respiratory: Negative  Endocrine: Negative  Hematologic/Lymphatic: Negative  Skin: Negative  Musculoskeletal: Negative  Gastrointestinal: Negative  Genitourinary: Negative  Neurological: Negative  Psychiatric/Behavioral: Negative  Allergic/Immunologic: Negative          Vitals:    09/07/21 0751   BP: 136/82   Pulse: 73   SpO2: 98%           Physical Exam  Vitals reviewed  Constitutional:       Appearance: Normal appearance  HENT:      Head: Normocephalic  Nose: Nose normal       Mouth/Throat:      Mouth: Mucous membranes are moist       Pharynx: Oropharynx is clear  Eyes:      General: No scleral icterus  Conjunctiva/sclera: Conjunctivae normal    Cardiovascular:      Rate and Rhythm: Normal rate and regular rhythm  Heart sounds: No murmur heard  No friction rub  No gallop  Pulmonary:      Effort: Pulmonary effort is normal  No respiratory distress  Breath sounds: Normal breath sounds  No wheezing or rales  Abdominal:      General: Abdomen is flat  Bowel sounds are normal  There is no distension  Palpations: Abdomen is soft  Tenderness: There is no abdominal tenderness  There is no guarding  Musculoskeletal:      Cervical back: Normal range of motion and neck supple  Right lower leg: No edema  Left lower leg: No edema  Skin:     General: Skin is warm and dry  Neurological:      General: No focal deficit present  Mental Status: He is alert and oriented to person, place, and time  Psychiatric:         Mood and Affect: Mood normal          Behavior: Behavior normal          Discussion/Summary:    Coronary Artery Disease: History of PCI to the LAD and Distal RCA  LDL is 57  Recheck lipids  Lifestyle and weight loss reviewed  The patient was counseled regarding diagnostic results, instructions for management, risk factor reductions, impressions  total time of encounter was 25 minutes and 15 minutes was spent counseling

## 2021-12-26 DIAGNOSIS — E78.5 DYSLIPIDEMIA: ICD-10-CM

## 2021-12-26 DIAGNOSIS — I25.10 CAD (CORONARY ARTERY DISEASE): ICD-10-CM

## 2021-12-28 RX ORDER — CARVEDILOL 12.5 MG/1
12.5 TABLET ORAL 2 TIMES DAILY
Qty: 180 TABLET | Refills: 3 | Status: SHIPPED | OUTPATIENT
Start: 2021-12-28

## 2021-12-28 RX ORDER — ATORVASTATIN CALCIUM 80 MG/1
TABLET, FILM COATED ORAL
Qty: 90 TABLET | Refills: 3 | Status: SHIPPED | OUTPATIENT
Start: 2021-12-28

## 2022-03-08 ENCOUNTER — OFFICE VISIT (OUTPATIENT)
Dept: OBGYN CLINIC | Facility: CLINIC | Age: 57
End: 2022-03-08
Payer: COMMERCIAL

## 2022-03-08 ENCOUNTER — APPOINTMENT (OUTPATIENT)
Dept: RADIOLOGY | Facility: AMBULARY SURGERY CENTER | Age: 57
End: 2022-03-08
Payer: COMMERCIAL

## 2022-03-08 VITALS
WEIGHT: 286 LBS | BODY MASS INDEX: 36.7 KG/M2 | HEIGHT: 74 IN | HEART RATE: 91 BPM | DIASTOLIC BLOOD PRESSURE: 89 MMHG | SYSTOLIC BLOOD PRESSURE: 135 MMHG

## 2022-03-08 DIAGNOSIS — M25.521 PAIN IN RIGHT ELBOW: ICD-10-CM

## 2022-03-08 DIAGNOSIS — S46.219A TEAR OF BICEPS TENDON: Primary | ICD-10-CM

## 2022-03-08 DIAGNOSIS — M70.21 OLECRANON BURSITIS OF RIGHT ELBOW: ICD-10-CM

## 2022-03-08 PROCEDURE — 73080 X-RAY EXAM OF ELBOW: CPT

## 2022-03-08 PROCEDURE — 99204 OFFICE O/P NEW MOD 45 MIN: CPT | Performed by: PHYSICAL MEDICINE & REHABILITATION

## 2022-03-08 NOTE — PROGRESS NOTES
1  Tear of biceps tendon     2  Pain in right elbow  MRI elbow right wo contrast    CANCELED: XR shoulder 2+ vw right   3  Olecranon bursitis of right elbow  MRI elbow right wo contrast     Orders Placed This Encounter   Procedures    MRI elbow right wo contrast      Impression:  Right arm pain likely secondary to traumatic olecranon bursitis and distal bicep tendon tear  The patient had an acute injury about a week ago when he was lifting something heavy  As this could be an acute biceps tendon tear, we will obtain an MRI of his elbow  We will have this scheduled by the sports liaison  I will see him back for MRI review  We will also drain his olecranon bursa as it has been worsening  Imaging Studies (I personally reviewed images in PACS and report):  Right elbow x-rays most recent to this encounter reviewed  These images show soft tissue swelling at the olecranon bursa site  There is a small bone spur off of the olecranon  No acute osseous abnormalities  No joint effusion  No follow-ups on file  Patient is in agreement with the above plan  HPI:  Violet Cristobal is a 49 y o  male  who presents for evaluation of   Chief Complaint   Patient presents with    Right Elbow - Pain     Onset/Mechanism: Popped something in his elbow last week  Has had chronic swelling in his elbow for a few months  Location: Anterior elbow and forearm  Posterior elbow  Radiation: As above  Provocative: Lifting  Severity: Improving  Associated Symptoms: Swelling in the back of the elbow  Treatment so far: No recent treatment      Following history reviewed and updated:  Past Medical History:   Diagnosis Date    CAD (coronary artery disease)     Dyslipidemia     Ear problems     Hyperlipidemia     Hypertension     Obesity      Past Surgical History:   Procedure Laterality Date    ADENOIDECTOMY      COLONOSCOPY N/A 2/19/2019    Procedure: COLONOSCOPY;  Surgeon: Pilar Nielsen MD;  [facility]; Service: Gastroenterology    CORONARY ANGIOPLASTY WITH STENT PLACEMENT      CORONARY ARTERY BYPASS GRAFT       Social History   Social History     Substance and Sexual Activity   Alcohol Use Yes    Comment: 2 per week     Social History     Substance and Sexual Activity   Drug Use No     Social History     Tobacco Use   Smoking Status Never Smoker   Smokeless Tobacco Never Used     Family History   Problem Relation Age of Onset    Diabetes Mother         on the mother's side    Liver cancer Father     Stomach cancer Father     Heart failure Maternal Grandmother     Heart attack Maternal Grandfather     Hypertension Family     Hyperlipidemia Neg Hx     Anuerysm Neg Hx     Arrhythmia Neg Hx     Sudden death Neg Hx     Stroke Neg Hx     Clotting disorder Neg Hx      No Known Allergies     Constitutional:  /89   Pulse 91   Ht 6' 2" (1 88 m)   Wt 130 kg (286 lb)   BMI 36 72 kg/m²    General: NAD  Eyes: Anicteric sclerae  Neck: Supple  Lungs: Unlabored breathing  Cardiovascular: No lower extremity edema  Skin: Intact without erythema  Neurologic: Sensation intact to light touch  Psychiatric: Mood and affect are appropriate  Right Elbow Exam     Tenderness   Right elbow tenderness location: Distal bicep tendon/radial tuberosity  Range of Motion   The patient has normal right elbow ROM  Muscle Strength   Pronation:  5/5   Supination:  4/5     Tests   Varus: negative  Valgus: negative  Tinel's sign (cubital tunnel): negative    Other   Erythema: absent  Scars: absent  Sensation: normal  Pulse: present    Comments:  Soft tissue swelling over the antecubital fossa    Normal hook test              Procedures

## 2022-03-17 ENCOUNTER — HOSPITAL ENCOUNTER (OUTPATIENT)
Dept: RADIOLOGY | Facility: HOSPITAL | Age: 57
Discharge: HOME | End: 2022-03-17
Attending: PHYSICAL MEDICINE & REHABILITATION
Payer: COMMERCIAL

## 2022-03-17 DIAGNOSIS — M25.521 PAIN IN RIGHT ELBOW: ICD-10-CM

## 2022-03-17 DIAGNOSIS — M70.21 OLECRANON BURSITIS OF RIGHT ELBOW: ICD-10-CM

## 2022-03-17 PROCEDURE — G1004 CDSM NDSC: HCPCS

## 2022-03-17 PROCEDURE — 73221 MRI JOINT UPR EXTREM W/O DYE: CPT

## 2022-03-18 ENCOUNTER — OFFICE VISIT (OUTPATIENT)
Dept: OBGYN CLINIC | Facility: CLINIC | Age: 57
End: 2022-03-18
Payer: COMMERCIAL

## 2022-03-18 VITALS
HEIGHT: 74 IN | SYSTOLIC BLOOD PRESSURE: 145 MMHG | WEIGHT: 285 LBS | BODY MASS INDEX: 36.57 KG/M2 | HEART RATE: 92 BPM | DIASTOLIC BLOOD PRESSURE: 99 MMHG

## 2022-03-18 DIAGNOSIS — M70.21 OLECRANON BURSITIS OF RIGHT ELBOW: ICD-10-CM

## 2022-03-18 DIAGNOSIS — M25.521 PAIN IN RIGHT ELBOW: Primary | ICD-10-CM

## 2022-03-18 DIAGNOSIS — S46.219A TEAR OF BICEPS TENDON: ICD-10-CM

## 2022-03-18 PROCEDURE — 1036F TOBACCO NON-USER: CPT | Performed by: PHYSICAL MEDICINE & REHABILITATION

## 2022-03-18 PROCEDURE — 99213 OFFICE O/P EST LOW 20 MIN: CPT | Performed by: PHYSICAL MEDICINE & REHABILITATION

## 2022-03-18 PROCEDURE — 3008F BODY MASS INDEX DOCD: CPT | Performed by: PHYSICAL MEDICINE & REHABILITATION

## 2022-03-18 PROCEDURE — 20605 DRAIN/INJ JOINT/BURSA W/O US: CPT | Performed by: PHYSICAL MEDICINE & REHABILITATION

## 2022-03-18 RX ORDER — LIDOCAINE HYDROCHLORIDE 10 MG/ML
2 INJECTION, SOLUTION INFILTRATION; PERINEURAL
Status: COMPLETED | OUTPATIENT
Start: 2022-03-18 | End: 2022-03-18

## 2022-03-18 RX ADMIN — LIDOCAINE HYDROCHLORIDE 2 ML: 10 INJECTION, SOLUTION INFILTRATION; PERINEURAL at 07:52

## 2022-03-18 NOTE — PROGRESS NOTES
1  Pain in right elbow     2  Tear of biceps tendon     3  Olecranon bursitis of right elbow       No orders of the defined types were placed in this encounter  Impression:  Patient is here in follow up of right arm pain likely secondary to traumatic, hemorrhagic olecranon bursitis and partial distal bicep tendon tear  Continue to avoid lifting above 5 lb  Aspiration as below  I will see him back in four weeks to reassess if needed         Imaging Studies (I personally reviewed images in PACS and report):  Right elbow x-rays most recent to this encounter reviewed  These images show soft tissue swelling at the olecranon bursa site  There is a small bone spur off of the olecranon  No acute osseous abnormalities  No joint effusion  Return in about 4 weeks (around 4/15/2022)  Patient is in agreement with the above plan  HPI:  Caroline Ruiz is a 56 y o  male  who presents in follow up  Here for   Chief Complaint   Patient presents with    Right Elbow - Pain, Follow-up, Swelling       Since last visit: He feels improved since last visit  Following history reviewed and updated:  Past Medical History:   Diagnosis Date    CAD (coronary artery disease)     Dyslipidemia     Ear problems     Hyperlipidemia     Hypertension     Obesity      Past Surgical History:   Procedure Laterality Date    ADENOIDECTOMY      COLONOSCOPY N/A 2/19/2019    Procedure: COLONOSCOPY;  Surgeon: Sparkle Diego MD;  [facility];   Service: Gastroenterology    CORONARY ANGIOPLASTY WITH STENT PLACEMENT      CORONARY ARTERY BYPASS GRAFT       Social History   Social History     Substance and Sexual Activity   Alcohol Use Yes    Comment: 2 per week     Social History     Substance and Sexual Activity   Drug Use No     Social History     Tobacco Use   Smoking Status Never Smoker   Smokeless Tobacco Never Used     Family History   Problem Relation Age of Onset    Diabetes Mother         on the mother's side    Liver cancer Father     Stomach cancer Father     Heart failure Maternal Grandmother     Heart attack Maternal Grandfather     Hypertension Family     Hyperlipidemia Neg Hx     Anuerysm Neg Hx     Arrhythmia Neg Hx     Sudden death Neg Hx     Stroke Neg Hx     Clotting disorder Neg Hx      No Known Allergies     Constitutional:  /99   Pulse 92   Ht 6' 2" (1 88 m)   Wt 129 kg (285 lb)   BMI 36 59 kg/m²    General: NAD  Eyes: Clear sclerae  ENT: No inflammation, lesion, or mass of lips  No tracheal deviation  Musculoskeletal: As mentioned below  Integumentary: No visible rashes or skin lesions  Pulmonary/Chest: Effort normal  No respiratory distress  Neuro: CN's grossly intact, MOORE  Psych: Normal affect and judgement  Vascular: WWP  Right Elbow Exam     Tenderness   Right elbow tenderness location: Radial tuberosity  Range of Motion   The patient has normal right elbow ROM  Muscle Strength   Pronation:  5/5   Supination:  4/5     Other   Erythema: absent  Scars: absent  Sensation: normal  Pulse: present    Comments:  Olecranon bursitis  Medium joint arthrocentesis: R olecranon bursa  Universal Protocol:  Procedure performed by:  Consent: Verbal consent obtained    Consent given by: patient  Timeout called at: 3/18/2022 7:42 AM   Patient understanding: patient states understanding of the procedure being performed  Patient identity confirmed: verbally with patient    Supporting Documentation  Indications: joint swelling and pain   Procedure Details  Location: elbow - R olecranon bursa  Needle size: 18 G  Ultrasound guidance: no  Approach: dorsal  Medications administered: 2 mL lidocaine 1 %    Aspirate amount: 17 mL  Aspirate: bloody    Patient tolerance: patient tolerated the procedure well with no immediate complications  Dressing:  Sterile dressing applied

## 2022-03-18 NOTE — PATIENT INSTRUCTIONS
You can obtain diclofenac cream/gel/ointment over the counter  Many brands make this medication and they include Voltaren, Aspercreme and Aleve  You can use this up to 3 times per day  It is best to wash the area with water and then pat dry  The cream absorbs better after this  Be careful not to let any pets or children get in contact with the medication as it can be harmful to them  If you develop a skin reaction, stop using the cream     Over the next few days, you should do the following:     Ice the area for 15 minutes and then remove ice for at least 15 minutes  Try to do this as much as you can throughout the day (at least 4-5 x per day)  Ice serves as an anti-inflammatory and will help with recovery by reducing pain and swelling   Elevate the area above the level of your heart as much as you can  This will help decrease swelling and pain   Relative rest- avoid activities that cause discomfort/pain in that area   You can use compression or ACE wrap to help with swelling

## 2022-04-18 ENCOUNTER — OFFICE VISIT (OUTPATIENT)
Dept: OBGYN CLINIC | Facility: CLINIC | Age: 57
End: 2022-04-18
Payer: COMMERCIAL

## 2022-04-18 VITALS
HEIGHT: 74 IN | HEART RATE: 67 BPM | WEIGHT: 285 LBS | DIASTOLIC BLOOD PRESSURE: 95 MMHG | BODY MASS INDEX: 36.57 KG/M2 | SYSTOLIC BLOOD PRESSURE: 155 MMHG

## 2022-04-18 DIAGNOSIS — S46.219A TEAR OF BICEPS TENDON: Primary | ICD-10-CM

## 2022-04-18 DIAGNOSIS — M70.21 OLECRANON BURSITIS OF RIGHT ELBOW: ICD-10-CM

## 2022-04-18 PROCEDURE — 1036F TOBACCO NON-USER: CPT | Performed by: PHYSICAL MEDICINE & REHABILITATION

## 2022-04-18 PROCEDURE — 3008F BODY MASS INDEX DOCD: CPT | Performed by: PHYSICAL MEDICINE & REHABILITATION

## 2022-04-18 PROCEDURE — 99213 OFFICE O/P EST LOW 20 MIN: CPT | Performed by: PHYSICAL MEDICINE & REHABILITATION

## 2022-04-18 NOTE — PROGRESS NOTES
1  Tear of biceps tendon     2  Olecranon bursitis of right elbow       No orders of the defined types were placed in this encounter  Impression:  Patient is here in follow up of right arm pain likely secondary to traumatic, hemorrhagic olecranon bursitis and partial distal bicep tendon tear  Patient has slight swelling on the olecranon bursa but it has not been bothersome  His strength with resisted forearm supination has also improved  His symptoms should only improve from here on out  I will see him back if needed  We decided that the risks of aspiration outweigh the benefits at this time       Imaging Studies (I personally reviewed images in PACS and report):  Right elbow x-rays most recent to this encounter reviewed   These images show soft tissue swelling at the olecranon bursa site  Elijah Brochure is a small bone spur off of the olecranon   No acute osseous abnormalities   No joint effusion  No follow-ups on file  Patient is in agreement with the above plan  HPI:  Rui Carty is a 56 y o  male  who presents in follow up  Here for   Chief Complaint   Patient presents with    Right Elbow - Follow-up, Swelling       Since last visit:  The above  Following history reviewed and updated:  Past Medical History:   Diagnosis Date    CAD (coronary artery disease)     Dyslipidemia     Ear problems     Hyperlipidemia     Hypertension     Obesity      Past Surgical History:   Procedure Laterality Date    ADENOIDECTOMY      COLONOSCOPY N/A 2/19/2019    Procedure: COLONOSCOPY;  Surgeon: Josselin Altman MD;  [facility];   Service: Gastroenterology    CORONARY ANGIOPLASTY WITH STENT PLACEMENT      CORONARY ARTERY BYPASS GRAFT       Social History   Social History     Substance and Sexual Activity   Alcohol Use Yes    Comment: 2 per week     Social History     Substance and Sexual Activity   Drug Use No     Social History     Tobacco Use   Smoking Status Never Smoker   Smokeless Tobacco Never Used     Family History   Problem Relation Age of Onset    Diabetes Mother         on the mother's side    Liver cancer Father     Stomach cancer Father     Heart failure Maternal Grandmother     Heart attack Maternal Grandfather     Hypertension Family     Hyperlipidemia Neg Hx     Anuerysm Neg Hx     Arrhythmia Neg Hx     Sudden death Neg Hx     Stroke Neg Hx     Clotting disorder Neg Hx      No Known Allergies     Constitutional:  /95   Pulse 67   Ht 6' 2" (1 88 m)   Wt 129 kg (285 lb)   BMI 36 59 kg/m²    General: NAD  Eyes: Clear sclerae  ENT: No inflammation, lesion, or mass of lips  No tracheal deviation  Musculoskeletal: As mentioned below  Integumentary: No visible rashes or skin lesions  Pulmonary/Chest: Effort normal  No respiratory distress  Neuro: CN's grossly intact, MOORE  Psych: Normal affect and judgement  Vascular: WWP  Right Elbow Exam     Tenderness   The patient is experiencing tenderness in the radial head  Range of Motion   The patient has normal right elbow ROM      Muscle Strength   Pronation:  5/5   Supination:  4/5     Other   Erythema: absent  Scars: absent  Sensation: normal  Pulse: present             Procedures

## 2022-05-27 ENCOUNTER — AMB VIDEO VISIT (OUTPATIENT)
Dept: OTHER | Facility: HOSPITAL | Age: 57
End: 2022-05-27
Payer: COMMERCIAL

## 2022-05-27 DIAGNOSIS — U07.1 COVID-19: Primary | ICD-10-CM

## 2022-05-27 PROCEDURE — 99212 OFFICE O/P EST SF 10 MIN: CPT | Performed by: PHYSICIAN ASSISTANT

## 2022-05-27 RX ORDER — BENZONATATE 100 MG/1
100 CAPSULE ORAL 3 TIMES DAILY PRN
Qty: 20 CAPSULE | Refills: 0 | Status: SHIPPED | OUTPATIENT
Start: 2022-05-27

## 2022-05-27 NOTE — CARE ANYWHERE EVISITS
Visit Summary for [facility] - Gender: Male - Date of Birth: [id number]  Date: [id number] - Duration: 7 minutes  Patient: [facility]  Provider: Ruby Carter PA-C    Patient Contact Information  Address  [address] [place] [id number]  [phone]    Visit Topics  Flu-Like Symptoms [Added By: Self - 2022-05-27]    Triage Questions   What is your current physical address in the event of a medical emergency? Answer []  Are you allergic to any medications? Answer []  Are you now or could you be pregnant? Answer []  Do you have any immune system compromise or chronic lung   disease? Answer []  Do you have any vulnerable family members in the home (infant, pregnant, cancer, elderly)? Answer []     Conversation Transcripts  [0A][0A] [Notification] You are connected with Ruby Carter PA-C, Urgent Care Specialist [0A][Notification] Radha Umana is located in [place]  [0A][Notification] Radha Umana has shared health history  Felix Felipe  [0A]    Diagnosis  COVID-19    Procedures  Value: [id number] Code: CPT-4 UNLISTED E&M SERVICE    Medications Prescribed    No prescriptions ordered    Electronically signed by: Mary Lamas(NPI [id number])

## 2022-05-27 NOTE — PROGRESS NOTES
Video Visit - Julia Cowden 56 y o  male MRN: [id number]    REQUIRED DOCUMENTATION:         1  This service was provided via [employer]  2  Provider located at [address] [place] [id number]  3  [facility] provider: Jeff Smith PA-C   4  Identify all parties in room with patient during [facility] visit:  No one else  5  After connecting through [employer], patient was identified by name and date of birth  Patient was then informed that this was a Telemedicine visit and that the exam was being conducted confidentially over secure lines  My office door was closed  No one else was in the room  Patient acknowledged consent and understanding of privacy and security of the Telemedicine visit  I informed the patient that I have reviewed their record in Epic and presented the opportunity for them to ask any questions regarding the visit today  The patient agreed to participate  HPI  Patient tested positive for covid yesterday  He is having a lot of coughing  He started with symptoms on Wednesday  Yesterday he woke up achy, fatigue, with sore throat, headache , and possible fever and tested positive  He states he had a crappy night last night  He is vaccinated and boostered  He states that he has had an RAMIRO in 2016  He does follow up with cardiology  He is interested in antiviral treatment  Physical Exam  Constitutional:       General: He is not in acute distress  Appearance: Normal appearance  He is not ill-appearing, toxic-appearing or diaphoretic  Pulmonary:      Effort: Pulmonary effort is normal  No respiratory distress  Skin:     General: Skin is dry  Neurological:      General: No focal deficit present  Mental Status: He is alert and oriented to person, place, and time     Psychiatric:         Mood and Affect: Mood normal          Behavior: Behavior normal          Diagnoses and all orders for this visit:    COVID-19  -     nirmatrelvir & ritonavir (Paxlovid) tablet therapy pack; Take 3 tablets by mouth 2 (two) times a day for 5 days Take 2 nirmatrelvir tablets + 1 ritonavir tablet together per dose  -     benzonatate (TESSALON PERLES) 100 mg capsule; Take 1 capsule (100 mg total) by mouth 3 (three) times a day as needed for cough      Patient Instructions   -pt is high risk for severe covid due to BMI, CAD, hx MI  -paxlovid risks and benefits discussed with patient  -Paxlovid Rx sent to pharmacy  He is aware this is a EUA medication and he does not have to take it  -discussed he should hold his hold his statin for 5 days  -monitor HR on carvedilol  -follow up with PCP  -ER if worsen     EMERGENCY USE AUTHORIZATION (EUA) OF PAXLOVID FOR CORONAVIRUS DISEASE 2019 (COVID-19)     You are being given this Fact Sheet because your healthcare provider believes it is necessary to provide you with PAXLOVID for the treatment of mild-to-moderate coronavirus disease (COVID-19) caused by the SARS-CoV-2 virus  This Fact Sheet contains information to help you understand the risks and benefits of taking the PAXLOVID you have received or may receive  The [place]  Food and Drug Administration (FDA) has issued an Emergency Use Authorization (EUA) to make PAXLOVID available during the COVID-19 pandemic (for more details about an EUA please see Trinh Florez is an Emergency Use Authorization?  at the end of this document)  PAXLOVID is not an FDA-approved medicine in the [place]  Read this Fact Sheet for information about PAXLOVID  Talk to your healthcare provider about your options or if you have any questions  It is your choice to take PAXLOVID  What is COVID-19? COVID-19 is caused by a virus called a coronavirus  You can get COVID-19 through close contact with another person who has the virus  COVID-19 illnesses have ranged from very mild-to-severe, including illness resulting in death   While information so far suggests that most COVID-19 illness is mild, serious illness can happen and may cause some of your other medical conditions to become worse  Older people and people of all ages with severe, long lasting (chronic) medical conditions like heart disease, lung disease, and diabetes, for example seem to be at higher risk of being hospitalized for COVID-19  What is PAXLOVID? Vincent Corby is an investigational medicine used to treat mild-to-moderate COVID-19 in adults and children [15years of age and older weighing at least 80 pounds (36 kg)] with positive results of direct SARS-CoV-2 viral testing, and who are at high risk for progression to severe COVID-19, including hospitalization or death  PAXLOVID is investigational because it is still being studied  There is limited information about the safety and effectiveness of using PAXLOVID to treat people with mild-to-moderate COVID-19  The FDA has authorized the emergency use of PAXLOVID for the treatment of mild-tomoderate COVID-19 in adults and children [15years of age and older weighing at least 80 pounds (36 kg)] with a positive test for the virus that causes COVID-19, and who are at high risk for progression to severe COVID-19, including hospitalization or death, under an EUA  What should I tell my healthcare provider before I take PAXLOVID? Tell your healthcare provider if you:    Have any allergies    Have liver or kidney disease    Are pregnant or plan to become pregnant    Are breastfeeding a child    Have any serious illnesses   Tell your healthcare provider about all the medicines you take, including prescription and over-the-counter medicines, vitamins, and herbal supplements  Some medicines may interact with PAXLOVID and may cause serious side effects  Keep a list of your medicines to show your healthcare provider and pharmacist when you get a new medicine  You can ask your healthcare provider or pharmacist for a list of medicines that interact with PAXLOVID   Do not start taking a new medicine without telling your healthcare provider  Your healthcare provider can tell you if it is safe to take PAXLOVID with other medicines  Tell your healthcare provider if you are taking combined hormonal contraceptive  PAXLOVID may affect how your birth control pills work  Females who are able to become pregnant should use another effective alternative form of contraception or an additional barrier method of contraception  Talk to your healthcare provider if you have any questions about contraceptive methods that might be right for you  How do I take [address] consists of 2 medicines: nirmatrelvir and ritonavir  o Take 2 pink tablets of nirmatrelvir with 1 white tablet of ritonavir by mouth 2 times each day (in the morning and in the evening) for 5 days  For each dose, take all 3 tablets at the same time  o If you have kidney disease, talk to your healthcare provider  You may need a different dose   Swallow the tablets whole  Do not chew, break, or crush the tablets   Take PAXLOVID with or without food   Do not stop taking PAXLOVID without talking to your healthcare provider, even if you feel better   If you miss a dose of PAXLOVID within 8 hours of the time it is usually taken, take it as soon as you remember  If you miss a dose by more than 8 hours, skip the missed dose and take the next dose at your regular time  Do not take 2 doses of PAXLOVID at the same time   If you take too much PAXLOVID, call your healthcare provider or go to the nearest hospital emergency room right away   If you are taking a ritonavir- or cobicistat-containing medicine to treat hepatitis C or Human Immunodeficiency Virus (HIV), you should continue to take your medicine as prescribed by your healthcare provider  Talk to your healthcare provider if you do not feel better or if you feel worse after 5 days    Who should generally not take PAXLOVID?    Do not take PAXLOVID if:    You are allergic to nirmatrelvir, ritonavir, or any of the ingredients in PAXLOVID     You are taking any of the following medicines:   o Alfuzosin   o Pethidine, propoxyphene   o Ranolazine   o Amiodarone, dronedarone, flecainide, propafenone, quinidine   o Colchicine   o Lurasidone, pimozide, clozapine   o Dihydroergotamine, ergotamine, methylergonovine o Lovastatin, simvastatin   o Sildenafil (Revatio®) for pulmonary arterial hypertension (PAH)   o Triazolam, oral midazolam   o Apalutamide   o Carbamazepine, phenobarbital, phenytoin   o Rifampin o St  Ariass Wort (hypericum perforatum)   Taking PAXLOVID with these medicines may cause serious or life-threatening side effects or affect how PAXLOVID works  These are not the only medicines that may cause serious side effects if taken with PAXLOVID  PAXLOVID may increase or decrease the levels of multiple other medicines  It is very important to tell your healthcare provider about all of the medicines you are taking because additional laboratory tests or changes in the dose of your other medicines may be necessary while you are taking PAXLOVID  Your healthcare provider may also tell you about specific symptoms to watch out for that may indicate that you need to stop or decrease the dose of some of your other medicines    What are the important possible side effects of PAXLOVID? Possible side effects of PAXLOVID are:    Allergic Reactions  Allergic reactions can happen in people taking PAXLOVID, even after only 1 dose  Stop taking PAXLOVID and call your healthcare provider right away if you get any of the following symptoms of an allergic reaction:   o hives   o trouble swallowing or breathing   o swelling of the mouth, lips, or face   o throat tightness   o hoarseness   o skin rash    Liver Problems   Tell your healthcare provider right away if you have any of these signs and symptoms of liver problems: loss of appetite, yellowing of your skin and the whites of eyes (jaundice), dark-colored urine, pale colored stools and itchy skin, stomach area (abdominal) pain     Resistance to HIV Medicines  If you have untreated HIV infection, PAXLOVID may lead to some HIV medicines not working as well in the future     Other possible side effects include:   o altered sense of taste   o diarrhea   o high blood pressure   o muscle aches  These are not all the possible side effects of PAXLOVID  Not many people have taken PAXLOVID  Serious and unexpected side effects may happen  Gabino Marinelli is still being studied, so it is possible that all of the risks are not known at this time  What other treatment choices are there? Veklury (remdesivir) is FDA-approved for the treatment of mild-to-moderate [id number] in certain adults and children  Talk with your doctor to see if Maddie Barton is appropriate for you  Like Shayan Charles may also allow for the emergency use of other medicines to treat people with COVID-19  Go to https://DealCircle/ for information on the emergency use of other medicines that are authorized by FDA to treat people with COVID-19  Your healthcare provider may talk with you about clinical trials for which you may be eligible  It is your choice to be treated or not to be treated with PAXLOVID  Should you decide not to receive it or for your child not to receive it, it will not change your standard medical care  What if I am pregnant or breastfeeding? There is no experience treating pregnant women or breastfeeding mothers with PAXLOVID  For a mother and unborn baby, the benefit of taking PAXLOVID may be greater than the risk from the treatment  If you are pregnant, discuss your options and specific situation with your healthcare provider  It is recommended that you use effective barrier contraception or do not have sexual activity while taking PAXLOVID   If you are breastfeeding, discuss your options and specific situation with your healthcare provider  How do I report side effects with PAXLOVID? Contact your healthcare provider if you have any side effects that bother you or do not go away  Report side effects to FDA MedWatch at www fda gov/medwatch or call 1-706-KDY9203 or you can report side effects to [facility]  at the contact information provided below  Website Fax number Telephone number 46elks [phone]    How should I store [address]? Store PAXLOVID tablets at room temperature, between 68? F to 77? F (20?C to 25? C)  How can I learn more about COVID-19?  Ask your healthcare provider   Visit [facility]   Contact your local or state public health department    What is an Emergency Use Authorization (EUA)? The [place] FDA has made PAXLOVID available under an emergency access mechanism called an Emergency Use Authorization (EUA)  The EUA is supported by a [place] of Health and Human Service (HHS) declaration that circumstances exist to justify the emergency use of drugs and biological products during the COVID-19 pandemic  PAXLOVID for the treatment of mild-to-moderate COVID-19 in adults and children [15years of age and older weighing at least 80 pounds (36 kg)] with positive results of direct SARS-CoV-2 viral testing, and who are at high risk for progression to severe COVID-19, including hospitalization or death, has not undergone the same type of review as an FDA-approved product   In issuing an EUA under the IYZPC-79 public health emergency, the FDA has determined, among other things, that based on the total amount of scientific evidence available including data from adequate and well-controlled clinical trials, if available, it is reasonable to believe that the product may be effective for diagnosing, treating, or preventing COVID-19, or a serious or life-threatening disease or condition caused by COVID-19; that the known and potential benefits of the product, when used to diagnose, treat, or prevent such disease or condition, outweigh the known and potential risks of such product; and that there are no adequate, approved, and available alternatives  All of these criteria must be met to allow for the product to be used in the treatment of patients during the COVID-19 pandemic  The EUA for PAXLOVID is in effect for the duration of the COVID-19 declaration justifying emergency use of this product, unless terminated or revoked (after which the products may no longer be used under the EUA)

## 2022-05-27 NOTE — PATIENT INSTRUCTIONS
-pt is high risk for severe covid due to BMI, CAD, hx MI  -paxlovid risks and benefits discussed with patient  -Paxlovid Rx sent to pharmacy  He is aware this is a EUA medication and he does not have to take it  -discussed he should hold his hold his statin for 5 days  -monitor HR on carvedilol  -follow up with PCP  -ER if worsen     EMERGENCY USE AUTHORIZATION (EUA) OF PAXLOVID FOR CORONAVIRUS DISEASE 2019 (COVID-19)     You are being given this Fact Sheet because your healthcare provider believes it is necessary to provide you with PAXLOVID for the treatment of mild-to-moderate coronavirus disease (COVID-19) caused by the SARS-CoV-2 virus  This Fact Sheet contains information to help you understand the risks and benefits of taking the PAXLOVID you have received or may receive  The [place]  Food and Drug Administration (FDA) has issued an Emergency Use Authorization (EUA) to make PAXLOVID available during the COVID-19 pandemic (for more details about an EUA please see Sophie Nassar is an Emergency Use Authorization?  at the end of this document)  PAXLOVID is not an FDA-approved medicine in the [place]  Read this Fact Sheet for information about PAXLOVID  Talk to your healthcare provider about your options or if you have any questions  It is your choice to take PAXLOVID  What is COVID-19? COVID-19 is caused by a virus called a coronavirus  You can get COVID-19 through close contact with another person who has the virus  COVID-19 illnesses have ranged from very mild-to-severe, including illness resulting in death  While information so far suggests that most COVID-19 illness is mild, serious illness can happen and may cause some of your other medical conditions to become worse  Older people and people of all ages with severe, long lasting (chronic) medical conditions like heart disease, lung disease, and diabetes, for example seem to be at higher risk of being hospitalized for COVID-19  What is PAXLOVID? Teola Maddi is an investigational medicine used to treat mild-to-moderate COVID-19 in adults and children [15years of age and older weighing at least 80 pounds (36 kg)] with positive results of direct SARS-CoV-2 viral testing, and who are at high risk for progression to severe COVID-19, including hospitalization or death  PAXLOVID is investigational because it is still being studied  There is limited information about the safety and effectiveness of using PAXLOVID to treat people with mild-to-moderate COVID-19  The FDA has authorized the emergency use of PAXLOVID for the treatment of mild-tomoderate COVID-19 in adults and children [15years of age and older weighing at least 80 pounds (36 kg)] with a positive test for the virus that causes COVID-19, and who are at high risk for progression to severe COVID-19, including hospitalization or death, under an EUA  What should I tell my healthcare provider before I take PAXLOVID? Tell your healthcare provider if you: ? Have any allergies ? Have liver or kidney disease ? Are pregnant or plan to become pregnant ? Are breastfeeding a child ? Have any serious illnesses   Tell your healthcare provider about all the medicines you take, including prescription and over-the-counter medicines, vitamins, and herbal supplements  Some medicines may interact with PAXLOVID and may cause serious side effects  Keep a list of your medicines to show your healthcare provider and pharmacist when you get a new medicine  You can ask your healthcare provider or pharmacist for a list of medicines that interact with PAXLOVID  Do not start taking a new medicine without telling your healthcare provider  Your healthcare provider can tell you if it is safe to take PAXLOVID with other medicines  Tell your healthcare provider if you are taking combined hormonal contraceptive  PAXLOVID may affect how your birth control pills work   Females who are able to become pregnant should use another effective alternative form of contraception or an additional barrier method of contraception  Talk to your healthcare provider if you have any questions about contraceptive methods that might be right for you  How do I take PAXLOVID? ? PAXLOVID consists of 2 medicines: nirmatrelvir and ritonavir  o Take 2 pink tablets of nirmatrelvir with 1 white tablet of ritonavir by mouth 2 times each day (in the morning and in the evening) for 5 days  For each dose, take all 3 tablets at the same time  o If you have kidney disease, talk to your healthcare provider  You may need a different dose  ? Swallow the tablets whole  Do not chew, break, or crush the tablets  ? Take PAXLOVID with or without food  ? Do not stop taking PAXLOVID without talking to your healthcare provider, even if you feel better  ? If you miss a dose of PAXLOVID within 8 hours of the time it is usually taken, take it as soon as you remember  If you miss a dose by more than 8 hours, skip the missed dose and take the next dose at your regular time  Do not take 2 doses of PAXLOVID at the same time  ? If you take too much PAXLOVID, call your healthcare provider or go to the nearest hospital emergency room right away  ? If you are taking a ritonavir- or cobicistat-containing medicine to treat hepatitis C or Human Immunodeficiency Virus (HIV), you should continue to take your medicine as prescribed by your healthcare provider  Talk to your healthcare provider if you do not feel better or if you feel worse after 5 days    Who should generally not take PAXLOVID? Do not take PAXLOVID if: ?   You are allergic to nirmatrelvir, ritonavir, or any of the ingredients in PAXLOVID  ?    You are taking any of the following medicines:   o Alfuzosin   o Pethidine, propoxyphene   o Ranolazine   o Amiodarone, dronedarone, flecainide, propafenone, quinidine   o Colchicine   o Lurasidone, pimozide, clozapine   o Dihydroergotamine, ergotamine, methylergonovine o Lovastatin, simvastatin   o Sildenafil (Revatio®) for pulmonary arterial hypertension (PAH)   o Triazolam, oral midazolam   o Apalutamide   o Carbamazepine, phenobarbital, phenytoin   o Rifampin o St  Ariass Wort (hypericum perforatum)   Taking PAXLOVID with these medicines may cause serious or life-threatening side effects or affect how PAXLOVID works  These are not the only medicines that may cause serious side effects if taken with PAXLOVID  PAXLOVID may increase or decrease the levels of multiple other medicines  It is very important to tell your healthcare provider about all of the medicines you are taking because additional laboratory tests or changes in the dose of your other medicines may be necessary while you are taking PAXLOVID  Your healthcare provider may also tell you about specific symptoms to watch out for that may indicate that you need to stop or decrease the dose of some of your other medicines    What are the important possible side effects of PAXLOVID? Possible side effects of PAXLOVID are: ? Allergic Reactions  Allergic reactions can happen in people taking PAXLOVID, even after only 1 dose  Stop taking PAXLOVID and call your healthcare provider right away if you get any of the following symptoms of an allergic reaction:   o hives   o trouble swallowing or breathing   o swelling of the mouth, lips, or face   o throat tightness   o hoarseness   o skin rash    Liver Problems  Tell your healthcare provider right away if you have any of these signs and symptoms of liver problems: loss of appetite, yellowing of your skin and the whites of eyes (jaundice), dark-colored urine, pale colored stools and itchy skin, stomach area (abdominal) pain  ? Resistance to HIV Medicines  If you have untreated HIV infection, PAXLOVID may lead to some HIV medicines not working as well in the future   ?   Other possible side effects include:   o altered sense of taste   o diarrhea   o high blood pressure   o muscle aches  These are not all the possible side effects of PAXLOVID  Not many people have taken PAXLOVID  Serious and unexpected side effects may happen  [address] is still being studied, so it is possible that all of the risks are not known at this time  What other treatment choices are there? Veklury (remdesivir) is FDA-approved for the treatment of mild-to-moderate EDQPW-26 in certain adults and children  Talk with your doctor to see if Nazanin Delta is appropriate for you  Like Lexus [place] may also allow for the emergency use of other medicines to treat people with COVID-19  Go to https://[employer]/ for information on the emergency use of other medicines that are authorized by FDA to treat people with COVID-19  Your healthcare provider may talk with you about clinical trials for which you may be eligible  It is your choice to be treated or not to be treated with PAXLOVID  Should you decide not to receive it or for your child not to receive it, it will not change your standard medical care  What if I am pregnant or breastfeeding? There is no experience treating pregnant women or breastfeeding mothers with PAXLOVID  For a mother and unborn baby, the benefit of taking PAXLOVID may be greater than the risk from the treatment  If you are pregnant, discuss your options and specific situation with your healthcare provider  It is recommended that you use effective barrier contraception or do not have sexual activity while taking PAXLOVID  If you are breastfeeding, discuss your options and specific situation with your healthcare provider  How do I report side effects with PAXLOVID? Contact your healthcare provider if you have any side effects that bother you or do not go away   Report side effects to FDA MedWatch at www fda gov/medwatch or call 5-923-DVV8983 or you can report side effects to [facility]  at the contact information provided below  Website Fax number Telephone number Official[employer]DJ [phone]    How should I store [place]? Store PAXLOVID tablets at room temperature, between 68? F to 77? F (20?C to 25? C)  How can I learn more about COVID-19? ? Ask your healthcare provider  ? Visit [facility]  ? Contact your local or state public health department    What is an Emergency Use Authorization (EUA)? The [place] FDA has made PAXLOVID available under an emergency access mechanism called an Emergency Use Authorization (EUA)  The EUA is supported by a  of Health and Human Service (HHS) declaration that circumstances exist to justify the emergency use of drugs and biological products during the COVID-19 pandemic  PAXLOVID for the treatment of mild-to-moderate COVID-19 in adults and children [15years of age and older weighing at least 80 pounds (36 kg)] with positive results of direct SARS-CoV-2 viral testing, and who are at high risk for progression to severe COVID-19, including hospitalization or death, has not undergone the same type of review as an FDA-approved product  In issuing an EUA under the UIYJY-59 public health emergency, the FDA has determined, among other things, that based on the total amount of scientific evidence available including data from adequate and well-controlled clinical trials, if available, it is reasonable to believe that the product may be effective for diagnosing, treating, or preventing COVID-19, or a serious or life-threatening disease or condition caused by COVID-19; that the known and potential benefits of the product, when used to diagnose, treat, or prevent such disease or condition, outweigh the known and potential risks of such product; and that there are no adequate, approved, and available alternatives   All of these criteria must be met to allow for the product to be used in the treatment of patients during the COVID-19 pandemic  The EUA for PAXLOVID is in effect for the duration of the COVID-19 declaration justifying emergency use of this product, unless terminated or revoked (after which the products may no longer be used under the EUA)

## 2023-01-02 DIAGNOSIS — I25.10 CAD (CORONARY ARTERY DISEASE): ICD-10-CM

## 2023-01-02 DIAGNOSIS — E78.5 DYSLIPIDEMIA: ICD-10-CM

## 2023-01-03 RX ORDER — ATORVASTATIN CALCIUM 80 MG/1
TABLET, FILM COATED ORAL
Qty: 90 TABLET | Refills: 3 | Status: SHIPPED | OUTPATIENT
Start: 2023-01-03

## 2023-01-03 RX ORDER — CARVEDILOL 12.5 MG/1
TABLET ORAL
Qty: 180 TABLET | Refills: 3 | Status: SHIPPED | OUTPATIENT
Start: 2023-01-03

## 2023-02-21 ENCOUNTER — AMB VIDEO VISIT (OUTPATIENT)
Dept: OTHER | Facility: HOSPITAL | Age: 58
End: 2023-02-21

## 2023-02-21 DIAGNOSIS — R09.82 POST-NASAL DISCHARGE: Primary | ICD-10-CM

## 2023-02-21 PROBLEM — R03.0 ELEVATED BP WITHOUT DIAGNOSIS OF HYPERTENSION: Status: RESOLVED | Noted: 2020-08-03 | Resolved: 2023-02-21

## 2023-02-21 PROBLEM — R10.11 RIGHT UPPER QUADRANT ABDOMINAL PAIN: Status: RESOLVED | Noted: 2019-02-08 | Resolved: 2023-02-21

## 2023-02-21 PROBLEM — E78.9 LIPID DISORDER: Status: RESOLVED | Noted: 2017-05-31 | Resolved: 2023-02-21

## 2023-02-21 PROBLEM — M25.521 PAIN IN RIGHT ELBOW: Status: RESOLVED | Noted: 2017-01-09 | Resolved: 2023-02-21

## 2023-02-21 PROBLEM — Z12.11 SCREENING FOR COLON CANCER: Status: RESOLVED | Noted: 2019-02-08 | Resolved: 2023-02-21

## 2023-02-21 PROBLEM — H61.23 BILATERAL IMPACTED CERUMEN: Status: RESOLVED | Noted: 2020-08-03 | Resolved: 2023-02-21

## 2023-02-21 PROBLEM — S46.219A TEAR OF BICEPS TENDON: Status: RESOLVED | Noted: 2022-03-08 | Resolved: 2023-02-21

## 2023-02-21 PROBLEM — S59.919A: Status: RESOLVED | Noted: 2017-01-09 | Resolved: 2023-02-21

## 2023-02-21 PROBLEM — M70.21 OLECRANON BURSITIS OF RIGHT ELBOW: Status: RESOLVED | Noted: 2022-03-08 | Resolved: 2023-02-21

## 2023-02-21 RX ORDER — BENZONATATE 200 MG/1
200 CAPSULE ORAL 3 TIMES DAILY PRN
Qty: 20 CAPSULE | Refills: 0 | Status: SHIPPED | OUTPATIENT
Start: 2023-02-21

## 2023-02-21 NOTE — PATIENT INSTRUCTIONS
Postnasal Drip   AMBULATORY CARE:   Postnasal drip  is a condition that causes a large amount of mucus to collect in your throat or nose  It may also be called upper airway cough syndrome because the mucus causes repeated coughing  You may have a sore throat, or throat tissues may swell  This may feel like a lump in your throat  You may also feel like you need to clear your throat often  Contact your healthcare provider if:   You have trouble breathing because of the mucus  You have new or worsening symptoms, even with treatment  You have signs of an infection, such as yellow or green mucus, or a fever  You have questions or concerns about your condition or care  Treatment  may include any of the following:  Medicines  may be given to thin the mucus  You may need to swallow the medicine or use a device to flush your sinuses with liquid squirted into your nose  Nasal sprays may also be needed to keep the tissues in your nose moist  Medicines can also relieve congestion  Allergy medicine may help if your symptoms are caused by seasonal allergies, such as hay fever  You may need medicine to help control GERD  Antibiotics  may be needed to treat a bacterial infection  Manage postnasal drip:   Use a humidifier or vaporizer  Use a cool mist humidifier or a vaporizer to increase air moisture in your home  This may make it easier for you to breathe  Drink more liquids as directed  Liquids help keep your air passages moist and help you cough up mucus  Ask how much liquid to drink each day and which liquids are best for you  Avoid cold air and dry, heated air  Cold or dry air can trigger postnasal drip  Try to stay inside on cold days, or keep your mouth covered  Do not stay long in areas that have dry, heated air  Do not smoke, and avoid secondhand smoke  Nicotine and other chemicals in cigarettes and cigars can irritate your throat and make coughing worse   Ask your healthcare provider for information if you currently smoke and need help to quit  E-cigarettes or smokeless tobacco still contain nicotine  Talk to your healthcare provider before you use these products  Follow up with your doctor as directed:  Write down your questions so you remember to ask them during your visits  © Copyright [employer] 2022 Information is for End User's use only and may not be sold, redistributed or otherwise used for commercial purposes  The above information is an  only  It is not intended as medical advice for individual conditions or treatments  Talk to your doctor, nurse or pharmacist before following any medical regimen to see if it is safe and effective for you

## 2023-02-21 NOTE — CARE ANYWHERE EVISITS
Visit Summary for [facility] - Gender: Male - Date of Birth: [id number]  Date: [id number] - Duration: 17 minutes  Patient: [facility]  Provider: Adrianna Zamorano PA-C    Patient Contact Information  Address  [place] ; [address] [facility]  [phone]    Visit Topics  Cold [Added TutuKenton Shells - [id number]]    Triage Questions   What is your current physical address in the event of a medical emergency? Answer []  Are you allergic to any medications? Answer []  Are you now or could you be pregnant? Answer []  Do you have any immune system compromise or chronic lung   disease? Answer []  Do you have any vulnerable family members in the home (infant, pregnant, cancer, elderly)? Answer []     Conversation Transcripts  [0A][0A] [Notification] You are connected with Adrianna Zamorano PA-C, Urgent Care Specialist [0A][Notification] Stephy Rome is located in [address] [place] [address]  [0A][Notification] Stephy Rome has shared health history  Carmelo Manifold  [0A]    Diagnosis  Postnasal drip    Procedures  Value: [id number] Code: CPT-4 UNLISTED E&M SERVICE    Medications Prescribed    No prescriptions ordered    Electronically signed by: Evonne Monte(NPI [id number])

## 2023-02-21 NOTE — PROGRESS NOTES
Video Visit - Cliff Ventura 56 y o  male MRN: [id number]    REQUIRED DOCUMENTATION:         1  [facility]  2  Provider located at [address] [place] [address] [address] [facility] [place] [address] [phone]  3  [facility] provider: Dana Dimas PA-C   4  Identify all parties in room with patient during [facility] visit:  No one else  5  After connecting through [employer], patient was identified by name and date of birth  Patient was then informed that this was a Telemedicine visit and that the exam was being conducted confidentially over secure lines  My office door was closed  No one else was in the room  Patient acknowledged consent and understanding of privacy and security of the Telemedicine visit  I informed the patient that I have reviewed their record in Epic and presented the opportunity for them to ask any questions regarding the visit today  The patient agreed to participate  VITALS: Heart Rate: 100 BPM, Respiratory Rate: 16 RPM, Temperature Unavailable° F, Blood Pressure Unavailable mmHg, Pulse Ox Unavailable % on RA    HPI  Pt reports has been feeling unwell  COVID negative  He thinks he has bronchitis  Sx started Friday with sore throat then head congestion, cough, sinus pressure, chest congestion, wheezing  Yesterday was feeling a bit better, sinus pressure resolved  Taking OTC meds- sudafed, mucinex  Last night took mucinex before bed and was up coughing all night  Woke up today with coughing more, more productive  Denies chest tightness or SOB  Physical Exam  Constitutional:       General: He is not in acute distress  Appearance: Normal appearance  He is not toxic-appearing  HENT:      Head: Normocephalic and atraumatic  Nose: No rhinorrhea        Comments: Sounds congested, sniffling frequently     Mouth/Throat:      Mouth: Mucous membranes are moist    Eyes:      Conjunctiva/sclera: Conjunctivae normal    Cardiovascular:      Rate and Rhythm: Normal rate  Pulmonary:      Effort: Pulmonary effort is normal  No respiratory distress  Breath sounds: No wheezing (no gross audible wheeze through computer)  Comments: No cough throughout visit  Musculoskeletal:      Cervical back: Normal range of motion  Skin:     Findings: No rash (on face or neck)  Neurological:      Mental Status: He is alert  Cranial Nerves: No dysarthria or facial asymmetry  Psychiatric:         Mood and Affect: Mood normal          Behavior: Behavior normal          Diagnoses and all orders for this visit:    Post-nasal discharge  -     benzonatate (TESSALON) 200 MG capsule; Take 1 capsule (200 mg total) by mouth 3 (three) times a day as needed for cough      Patient Instructions   Postnasal Drip   AMBULATORY CARE:   Postnasal drip  is a condition that causes a large amount of mucus to collect in your throat or nose  It may also be called upper airway cough syndrome because the mucus causes repeated coughing  You may have a sore throat, or throat tissues may swell  This may feel like a lump in your throat  You may also feel like you need to clear your throat often  Contact your healthcare provider if:   • You have trouble breathing because of the mucus  • You have new or worsening symptoms, even with treatment  • You have signs of an infection, such as yellow or green mucus, or a fever  • You have questions or concerns about your condition or care  Treatment  may include any of the following:  • Medicines  may be given to thin the mucus  You may need to swallow the medicine or use a device to flush your sinuses with liquid squirted into your nose  Nasal sprays may also be needed to keep the tissues in your nose moist  Medicines can also relieve congestion  Allergy medicine may help if your symptoms are caused by seasonal allergies, such as hay fever  You may need medicine to help control GERD      • Antibiotics  may be needed to treat a bacterial infection  Manage postnasal drip:   • Use a humidifier or vaporizer  Use a cool mist humidifier or a vaporizer to increase air moisture in your home  This may make it easier for you to breathe  • Drink more liquids as directed  Liquids help keep your air passages moist and help you cough up mucus  Ask how much liquid to drink each day and which liquids are best for you  • Avoid cold air and dry, heated air  Cold or dry air can trigger postnasal drip  Try to stay inside on cold days, or keep your mouth covered  Do not stay long in areas that have dry, heated air  • Do not smoke, and avoid secondhand smoke  Nicotine and other chemicals in cigarettes and cigars can irritate your throat and make coughing worse  Ask your healthcare provider for information if you currently smoke and need help to quit  E-cigarettes or smokeless tobacco still contain nicotine  Talk to your healthcare provider before you use these products  Follow up with your doctor as directed:  Write down your questions so you remember to ask them during your visits  © Copyright [employer] 2022 Information is for End User's use only and may not be sold, redistributed or otherwise used for commercial purposes  The above information is an  only  It is not intended as medical advice for individual conditions or treatments  Talk to your doctor, nurse or pharmacist before following any medical regimen to see if it is safe and effective for you

## 2023-12-31 DIAGNOSIS — E78.5 DYSLIPIDEMIA: ICD-10-CM

## 2023-12-31 DIAGNOSIS — I25.10 CAD (CORONARY ARTERY DISEASE): ICD-10-CM

## 2024-01-02 ENCOUNTER — TELEPHONE (OUTPATIENT)
Dept: CARDIOLOGY CLINIC | Facility: CLINIC | Age: 59
End: 2024-01-02

## 2024-01-02 RX ORDER — ATORVASTATIN CALCIUM 80 MG/1
TABLET, FILM COATED ORAL
Qty: 30 TABLET | Refills: 0 | Status: SHIPPED | OUTPATIENT
Start: 2024-01-02

## 2024-01-02 RX ORDER — CARVEDILOL 12.5 MG/1
12.5 TABLET ORAL 2 TIMES DAILY
Qty: 60 TABLET | Refills: 0 | Status: SHIPPED | OUTPATIENT
Start: 2024-01-02

## 2024-01-26 DIAGNOSIS — E78.5 DYSLIPIDEMIA: ICD-10-CM

## 2024-01-26 RX ORDER — ATORVASTATIN CALCIUM 80 MG/1
TABLET, FILM COATED ORAL
Qty: 90 TABLET | Refills: 1 | Status: SHIPPED | OUTPATIENT
Start: 2024-01-26

## 2024-01-30 DIAGNOSIS — I25.10 CAD (CORONARY ARTERY DISEASE): ICD-10-CM

## 2024-01-30 RX ORDER — CARVEDILOL 12.5 MG/1
12.5 TABLET ORAL 2 TIMES DAILY
Qty: 60 TABLET | Refills: 0 | Status: SHIPPED | OUTPATIENT
Start: 2024-01-30

## 2024-02-27 DIAGNOSIS — I25.10 CAD (CORONARY ARTERY DISEASE): ICD-10-CM

## 2024-02-27 RX ORDER — CARVEDILOL 12.5 MG/1
12.5 TABLET ORAL 2 TIMES DAILY
Qty: 60 TABLET | Refills: 0 | Status: SHIPPED | OUTPATIENT
Start: 2024-02-27

## 2024-03-28 ENCOUNTER — OFFICE VISIT (OUTPATIENT)
Dept: CARDIOLOGY CLINIC | Facility: CLINIC | Age: 59
End: 2024-03-28
Payer: COMMERCIAL

## 2024-03-28 VITALS
SYSTOLIC BLOOD PRESSURE: 166 MMHG | HEIGHT: 74 IN | WEIGHT: 315 LBS | HEART RATE: 79 BPM | OXYGEN SATURATION: 95 % | DIASTOLIC BLOOD PRESSURE: 78 MMHG | BODY MASS INDEX: 40.43 KG/M2

## 2024-03-28 DIAGNOSIS — I25.10 CORONARY ARTERY DISEASE INVOLVING NATIVE CORONARY ARTERY OF NATIVE HEART WITHOUT ANGINA PECTORIS: Primary | ICD-10-CM

## 2024-03-28 DIAGNOSIS — I10 PRIMARY HYPERTENSION: ICD-10-CM

## 2024-03-28 PROCEDURE — 99214 OFFICE O/P EST MOD 30 MIN: CPT | Performed by: INTERNAL MEDICINE

## 2024-03-28 PROCEDURE — 93000 ELECTROCARDIOGRAM COMPLETE: CPT | Performed by: INTERNAL MEDICINE

## 2024-03-28 NOTE — PROGRESS NOTES
Cardiology Follow Up    Philippe Zapata  1965  [id number]  [facility]  [address] [facility] [address]  [address]  [facility] [id number] [phone]    1. Coronary artery disease involving native coronary artery of native heart without angina pectoris  POCT ECG          Interval History: Followup for CAD.    No chest pain, dyspnea or palpitations. Not exercising as much as he would like.     Medical Problems       Problem List       Hypertension    Acute ST elevation myocardial infarction (STEMI) involving right coronary artery (HCC)    Atherosclerosis    Coronary artery disease involving native coronary artery of native heart without angina pectoris    Dyslipidemia    Fatigue    Fatty liver    Non-ST elevation (NSTEMI) myocardial infarction (HCC)    Obesity        Past Medical History:   Diagnosis Date    CAD (coronary artery disease)     Dyslipidemia     Ear problems     Hyperlipidemia     Hypertension     Obesity     Tear of biceps tendon 3/8/2022     Social History     Socioeconomic History    Marital status: /Civil Union     Spouse name: Not on file    Number of children: Not on file    Years of education: Not on file    Highest education level: Not on file   Occupational History    Occupation:    Tobacco Use    Smoking status: Never    Smokeless tobacco: Never   Vaping Use    Vaping status: Never Used   Substance and Sexual Activity    Alcohol use: Yes     Comment: 2 per week    Drug use: No    Sexual activity: Not on file   Other Topics Concern    Not on file   Social History Narrative    Not on file     Social Determinants of Health     Financial Resource Strain: Not on file   Food Insecurity: Not on file   Transportation Needs: Not on file   Physical Activity: Not on file   Stress: Not on file   Social Connections: Not on file   Intimate Partner Violence: Not on file   Housing Stability: Not on file      Family History    Problem Relation Age of Onset    Diabetes Mother         on the mother's side    Liver cancer Father     Stomach cancer Father     Heart failure Maternal Grandmother     Heart attack Maternal Grandfather     Hypertension Family     Hyperlipidemia Neg Hx     Anuerysm Neg Hx     Arrhythmia Neg Hx     Sudden death Neg Hx     Stroke Neg Hx     Clotting disorder Neg Hx      Past Surgical History:   Procedure Laterality Date    ADENOIDECTOMY      COLONOSCOPY N/A 2/19/2019    Procedure: COLONOSCOPY;  Surgeon: Danelle Mena MD;  Location: [facility];  Service: Gastroenterology    CORONARY ANGIOPLASTY WITH STENT PLACEMENT      CORONARY ARTERY BYPASS GRAFT         Current Outpatient Medications:     aspirin (ECOTRIN LOW STRENGTH) 81 mg EC tablet, Take 81 mg by mouth daily, Disp: , Rfl:     atorvastatin (LIPITOR) 80 mg tablet, TAKE 1 TABLET BY MOUTH EVERY DAY, Disp: 90 tablet, Rfl: 1    carvedilol (COREG) 12.5 mg tablet, TAKE 1 TABLET BY MOUTH TWICE A DAY, Disp: 60 tablet, Rfl: 0    benzonatate (TESSALON) 200 MG capsule, Take 1 capsule (200 mg total) by mouth 3 (three) times a day as needed for cough, Disp: 20 capsule, Rfl: 0  No Known Allergies    Labs:     Chemistry        Component Value Date/Time     05/02/2017 0803     05/02/2017 0803    K 4.0 06/30/2021 0627    K 4.1 12/27/2018 0828     06/30/2021 0627     12/27/2018 0828    CO2 26 06/30/2021 0627    CO2 27 12/27/2018 0828    BUN 14 06/30/2021 0627    BUN 16 12/27/2018 0828    CREATININE 0.92 06/30/2021 0627    CREATININE 1.00 05/02/2017 0803    CREATININE 1.00 05/02/2017 0803        Component Value Date/Time    CALCIUM 8.7 06/30/2021 0627    CALCIUM 9.1 12/27/2018 0828    ALKPHOS 88 06/30/2021 0627    ALKPHOS 80 12/27/2018 0828    AST 26 06/30/2021 0627    AST 23 12/27/2018 0828    ALT 37 06/30/2021 0627    ALT 25 12/27/2018 0828    BILITOT 0.9 04/22/2017 0726            Lab Results   Component Value Date    CHOL 124 (L) 01/26/2017    CHOL  "203 (H) 03/24/2016     Lab Results   Component Value Date    HDL 49 06/18/2018    HDL 51 04/24/2017    HDL 47 01/26/2017     Lab Results   Component Value Date    LDLCALC 57 06/18/2018    LDLCALC 46 04/24/2017    LDLCALC 115 (H) 03/29/2016     Lab Results   Component Value Date    TRIG 93 06/18/2018    TRIG 66 04/24/2017    TRIG 103 01/26/2017     No results found for: \"CHOLHDL\"    Imaging: No results found.    EKG: Normal Sinus Rhythm.    Review of Systems   Constitutional: Negative.   HENT: Negative.     Eyes: Negative.    Cardiovascular: Negative.    Respiratory: Negative.     Endocrine: Negative.    Hematologic/Lymphatic: Negative.    Skin: Negative.    Musculoskeletal: Negative.    Gastrointestinal: Negative.    Genitourinary: Negative.    Neurological: Negative.    Psychiatric/Behavioral: Negative.     Allergic/Immunologic: Negative.        Vitals:    03/28/24 0804   BP: 166/78   Pulse: 79   SpO2: 95%           Physical Exam  Vitals reviewed.   Constitutional:       Appearance: Normal appearance.   HENT:      Head: Normocephalic.      Nose: Nose normal.      Mouth/Throat:      Mouth: Mucous membranes are moist.      Pharynx: Oropharynx is clear.   Eyes:      General: No scleral icterus.     Conjunctiva/sclera: Conjunctivae normal.   Cardiovascular:      Rate and Rhythm: Normal rate and regular rhythm.      Heart sounds: No murmur heard.     No friction rub. No gallop.   Pulmonary:      Effort: Pulmonary effort is normal. No respiratory distress.      Breath sounds: Normal breath sounds. No wheezing or rales.   Abdominal:      General: Abdomen is flat. Bowel sounds are normal. There is no distension.      Palpations: Abdomen is soft.      Tenderness: There is no abdominal tenderness. There is no guarding.   Musculoskeletal:      Cervical back: Normal range of motion and neck supple.      Right lower leg: No edema.      Left lower leg: No edema.   Skin:     General: Skin is warm and dry.   Neurological:      " General: No focal deficit present.      Mental Status: He is alert and oriented to person, place, and time.   Psychiatric:         Mood and Affect: Mood normal.         Behavior: Behavior normal.         Discussion/Summary:    Coronary Artery Disease: History of PCI to the LAD and Distal RCA. LDL is 57    Update lab work.  Discussed lifestyle modifications as well.       The patient was counseled regarding diagnostic results, instructions for management, risk factor reductions, impressions. total time of encounter was 25 minutes and 15 minutes was spent counseling.

## 2024-03-30 DIAGNOSIS — I25.10 CAD (CORONARY ARTERY DISEASE): ICD-10-CM

## 2024-04-01 RX ORDER — CARVEDILOL 12.5 MG/1
12.5 TABLET ORAL 2 TIMES DAILY
Qty: 60 TABLET | Refills: 0 | Status: SHIPPED | OUTPATIENT
Start: 2024-04-01

## 2024-07-31 DIAGNOSIS — E78.5 DYSLIPIDEMIA: ICD-10-CM

## 2024-07-31 RX ORDER — ATORVASTATIN CALCIUM 80 MG/1
TABLET, FILM COATED ORAL
Qty: 30 TABLET | Refills: 0 | Status: SHIPPED | OUTPATIENT
Start: 2024-07-31

## 2024-08-28 DIAGNOSIS — E78.5 DYSLIPIDEMIA: ICD-10-CM

## 2024-08-28 RX ORDER — ATORVASTATIN CALCIUM 80 MG/1
TABLET, FILM COATED ORAL
Qty: 30 TABLET | Refills: 0 | Status: SHIPPED | OUTPATIENT
Start: 2024-08-28

## 2024-09-19 ENCOUNTER — OFFICE VISIT (OUTPATIENT)
Age: 59
End: 2024-09-19
Payer: COMMERCIAL

## 2024-09-19 VITALS
HEIGHT: 74 IN | DIASTOLIC BLOOD PRESSURE: 83 MMHG | SYSTOLIC BLOOD PRESSURE: 142 MMHG | OXYGEN SATURATION: 100 % | WEIGHT: 285 LBS | TEMPERATURE: 98 F | HEART RATE: 85 BPM | BODY MASS INDEX: 36.57 KG/M2

## 2024-09-19 DIAGNOSIS — R53.83 OTHER FATIGUE: ICD-10-CM

## 2024-09-19 DIAGNOSIS — R09.81 HEAD CONGESTION: Primary | ICD-10-CM

## 2024-09-19 DIAGNOSIS — I10 PRIMARY HYPERTENSION: ICD-10-CM

## 2024-09-19 DIAGNOSIS — I21.4 NON-ST ELEVATION (NSTEMI) MYOCARDIAL INFARCTION (HCC): ICD-10-CM

## 2024-09-19 DIAGNOSIS — R60.9 TRACE EDEMA: ICD-10-CM

## 2024-09-19 DIAGNOSIS — E78.5 DYSLIPIDEMIA: ICD-10-CM

## 2024-09-19 PROCEDURE — 99203 OFFICE O/P NEW LOW 30 MIN: CPT

## 2024-09-19 RX ORDER — CETIRIZINE HYDROCHLORIDE 5 MG/1
5 TABLET ORAL DAILY
Qty: 30 TABLET | Refills: 0 | Status: SHIPPED | OUTPATIENT
Start: 2024-09-19 | End: 2024-10-19

## 2024-09-19 RX ORDER — FLUTICASONE PROPIONATE 50 MCG
2 SPRAY, SUSPENSION (ML) NASAL DAILY
Qty: 9.9 ML | Refills: 1 | Status: SHIPPED | OUTPATIENT
Start: 2024-09-19

## 2024-09-19 NOTE — PROGRESS NOTES
Ambulatory Visit  Name: Philippe Zapata      : 1965      MRN: [id number]  Encounter Provider: Herman Ugarte MD  Encounter Date: 2024   Encounter department: [facility]    Assessment & Plan  Head congestion  Patient endorses roughly 3 weeks of symptoms of head congestion.  Patient endorses some sinus congestion, occasional runny nose, occasional dry cough.  Patient feels this was preceded by viral URI, but symptoms have not abated.  No purulent drainage from sinuses and no infectious symptoms, less likely acute bacterial sinusitis.  More likely chronic upper airway cough syndrome versus slowly resolving postinfectious sinusitis.  Supportive care as outlined below, patient advised to continue below listed therapies for at least 2 weeks for efficacy with preferably longer for better control of symptoms.  Return to clinic in 1 week, and consider ipratropium nasal spray if additional augmentation needed at that time    Orders:  •  fluticasone (FLONASE) 50 mcg/act nasal spray; 2 sprays into each nostril daily  •  cetirizine (ZyrTEC) 5 MG tablet; Take 1 tablet (5 mg total) by mouth daily    BMI 36.0-36.9,adult  Patient with historically fluctuating BMI, history of MI due to obesity and hypertension with weights at that time of roughly 300 pounds.  Patient has had some success with weight management by instituting daily walks, but states that due to problem #1 he is very tired and has had been having difficulty completing these at this point in time.  Offered referral to weight management, declined at this time.  Laboratory studies outlined as below.  Given cardiac history and suspected metabolic syndrome patient would likely be a good candidate for SGLT2 therapy, can consider at upcoming appointment if indicated by laboratory studies.  Orders as below for further workup of possible metabolic implements    Orders:  •  Comprehensive metabolic panel; Future  •  Hemoglobin A1C; Future  •   CBC and differential; Future  •  TSH, 3rd generation with Free T4 reflex; Future  •  Comprehensive metabolic panel  •  CBC and differential  •  TSH, 3rd generation with Free T4 reflex    Dyslipidemia  Workup as per problem #2 of elevated BMI    Orders:  •  Hemoglobin A1C; Future  •  Lipid panel; Future  •  Lipid panel    Non-ST elevation (NSTEMI) myocardial infarction (HCC)  Review of medication indicates patient is on statin and beta-blocker.  Patient would likely benefit from additional goal-directed medical therapy in form of ARB plus minus SGLT2 for tighter blood pressure control and cardiac mortality benefit plus glycemic control.  Laboratory studies ordered, workup for appropriateness of above listed medications initiated.    Orders:  •  Comprehensive metabolic panel; Future  •  Lipid panel; Future  •  Comprehensive metabolic panel    Primary hypertension  BP Readings from Last 1 Encounters:   09/19/24 142/83     Blood pressure at high end of normal at visit today, given previous cardiac events more aggressive BP goal likely indicated for this patient  Will obtain baseline renal function as well as baseline electrolytes, will be a good candidate for initiation of ARB plus MRN at future appointments  Consider initiate low-dose losartan at upcoming appointment with possible initiation of low-dose spironolactone at follow-up      Orders:  •  CBC and differential; Future  •  CBC and differential    Other fatigue    Orders:  •  CBC and differential; Future  •  TSH, 3rd generation with Free T4 reflex; Future    Trace edema  Trace pitting edema to just superior to ankle  Could be due to body habitus and sedentary lifestyle versus worsening cardiac function  Will get baseline renal function and electrolytes and can consider ordering echo at future appointment  Will likely initiate some form of diuretic in the near future versus encourage more aggressive weight loss         Depression Screening and Follow-up Plan:  "Patient was screened for depression during today's encounter. They screened negative with a PHQ-2 score of 0.      History of Present Illness     Head cold for last 3 weeks   Took OTC \"sudafed-nena\" medication for about 10 days with no effect, unsure which medication he took precisely  Does endorse URI-like symptoms roughly 3 weeks ago which she states proceeded with current congestion and rhinorrhea  Rhinorrhea is nonpurulent, decreasing in quantity but still present and irritating  Additionally patient endorses occasional dry cough    Sinus congestion  Runny nose   Frequent coughing     Review of Systems   Constitutional:  Negative for chills and fever.   HENT:  Positive for congestion, postnasal drip, rhinorrhea and sinus pressure. Negative for ear pain and sore throat.    Eyes:  Negative for pain and visual disturbance.   Respiratory:  Negative for cough and shortness of breath.    Cardiovascular:  Negative for chest pain and palpitations.   Gastrointestinal:  Negative for abdominal pain and vomiting.   Genitourinary:  Negative for dysuria and hematuria.   Musculoskeletal:  Negative for arthralgias and back pain.   Skin:  Negative for color change and rash.   Neurological:  Negative for seizures and syncope.   All other systems reviewed and are negative.    Past Medical History:   Diagnosis Date   • CAD (coronary artery disease)    • Dyslipidemia    • Ear problems    • Hyperlipidemia    • Hypertension    • Obesity    • Tear of biceps tendon 3/8/2022     Past Surgical History:   Procedure Laterality Date   • ADENOIDECTOMY     • COLONOSCOPY N/A 2/19/2019    Procedure: COLONOSCOPY;  Surgeon: Danelle Mena MD;  Location: [facility];  Service: Gastroenterology   • CORONARY ANGIOPLASTY WITH STENT PLACEMENT     • CORONARY ARTERY BYPASS GRAFT       Family History   Problem Relation Age of Onset   • Diabetes Mother         on the mother's side   • Liver cancer Father    • Stomach cancer Father    • Heart failure " "Maternal Grandmother    • Heart attack Maternal Grandfather    • Hypertension Family    • Hyperlipidemia Neg Hx    • Anuerysm Neg Hx    • Arrhythmia Neg Hx    • Sudden death Neg Hx    • Stroke Neg Hx    • Clotting disorder Neg Hx      Social History     Tobacco Use   • Smoking status: Never   • Smokeless tobacco: Never   Vaping Use   • Vaping status: Never Used   Substance and Sexual Activity   • Alcohol use: Yes     Comment: 2 per week   • Drug use: No   • Sexual activity: Not on file     Current Outpatient Medications on File Prior to Visit   Medication Sig   • aspirin (ECOTRIN LOW STRENGTH) 81 mg EC tablet Take 81 mg by mouth daily   • atorvastatin (LIPITOR) 80 mg tablet TAKE 1 TABLET BY MOUTH EVERY DAY   • carvedilol (COREG) 12.5 mg tablet TAKE 1 TABLET BY MOUTH TWICE A DAY   • [DISCONTINUED] benzonatate (TESSALON) 200 MG capsule Take 1 capsule (200 mg total) by mouth 3 (three) times a day as needed for cough     No Known Allergies  Immunization History   Administered Date(s) Administered   • COVID-19 PFIZER VACCINE 0.3 ML IM 03/16/2021, 04/08/2021, 11/17/2021   • INFLUENZA 11/17/2021   • Influenza, injectable, quadrivalent, preservative free 0.5 mL 10/28/2019   • Influenza, recombinant, quadrivalent,injectable, preservative free 12/11/2018   • Pneumococcal Polysaccharide PPV23 12/11/2018   • Tdap 03/08/2011, 07/09/2021     Objective     /83   Pulse 85   Temp 98 °F (36.7 °C)   Ht 6' 2\" (1.88 m)   Wt 129 kg (285 lb)   SpO2 100%   BMI 36.59 kg/m²     Physical Exam  Vitals and nursing note reviewed.   Constitutional:       General: He is not in acute distress.     Appearance: He is well-developed. He is obese. He is not ill-appearing.   HENT:      Head: Normocephalic and atraumatic.      Right Ear: External ear normal.      Left Ear: External ear normal.      Mouth/Throat:      Mouth: Mucous membranes are moist.      Pharynx: Oropharynx is clear.   Eyes:      General:         Right eye: No discharge.    "      Left eye: No discharge.      Conjunctiva/sclera: Conjunctivae normal.   Cardiovascular:      Rate and Rhythm: Normal rate and regular rhythm.      Heart sounds: No murmur heard.     No friction rub. No gallop.   Pulmonary:      Effort: Pulmonary effort is normal. No respiratory distress.      Breath sounds: Normal breath sounds. No wheezing, rhonchi or rales.   Abdominal:      General: Abdomen is flat.      Palpations: Abdomen is soft. There is no mass.      Tenderness: There is no abdominal tenderness. There is no guarding.   Musculoskeletal:         General: No swelling.      Cervical back: Neck supple.      Right lower leg: Edema present.      Left lower leg: Edema present.      Comments: Trace LE edema    Skin:     General: Skin is warm and dry.      Capillary Refill: Capillary refill takes less than 2 seconds.   Neurological:      Mental Status: He is alert. Mental status is at baseline.   Psychiatric:         Mood and Affect: Mood normal.

## 2024-09-19 NOTE — ASSESSMENT & PLAN NOTE
Review of medication indicates patient is on statin and beta-blocker.  Patient would likely benefit from additional goal-directed medical therapy in form of ARB plus minus SGLT2 for tighter blood pressure control and cardiac mortality benefit plus glycemic control.  Laboratory studies ordered, workup for appropriateness of above listed medications initiated.    Orders:    Comprehensive metabolic panel; Future    Lipid panel; Future    Comprehensive metabolic panel

## 2024-09-19 NOTE — ASSESSMENT & PLAN NOTE
BP Readings from Last 1 Encounters:   09/19/24 142/83     Blood pressure at high end of normal at visit today, given previous cardiac events more aggressive BP goal likely indicated for this patient  Will obtain baseline renal function as well as baseline electrolytes, will be a good candidate for initiation of ARB plus MRN at future appointments  Consider initiate low-dose losartan at upcoming appointment with possible initiation of low-dose spironolactone at follow-up      Orders:    CBC and differential; Future    CBC and differential

## 2024-09-19 NOTE — ASSESSMENT & PLAN NOTE
Workup as per problem #2 of elevated BMI    Orders:    Hemoglobin A1C; Future    Lipid panel; Future    Lipid panel

## 2024-09-24 LAB
ALBUMIN SERPL-MCNC: 3.9 G/DL (ref 3.6–5.1)
ALBUMIN/GLOB SERPL: 1.4 (CALC) (ref 1–2.5)
ALP SERPL-CCNC: 103 U/L (ref 35–144)
ALT SERPL-CCNC: 19 U/L (ref 9–46)
AST SERPL-CCNC: 16 U/L (ref 10–35)
BASOPHILS # BLD AUTO: 50 CELLS/UL (ref 0–200)
BASOPHILS NFR BLD AUTO: 0.7 %
BILIRUB SERPL-MCNC: 0.5 MG/DL (ref 0.2–1.2)
BUN SERPL-MCNC: 16 MG/DL (ref 7–25)
BUN/CREAT SERPL: ABNORMAL (CALC) (ref 6–22)
CALCIUM SERPL-MCNC: 9 MG/DL (ref 8.6–10.3)
CHLORIDE SERPL-SCNC: 104 MMOL/L (ref 98–110)
CHOLEST SERPL-MCNC: 138 MG/DL
CHOLEST/HDLC SERPL: 3.6 (CALC)
CO2 SERPL-SCNC: 26 MMOL/L (ref 20–32)
CREAT SERPL-MCNC: 0.81 MG/DL (ref 0.7–1.3)
EOSINOPHIL # BLD AUTO: 178 CELLS/UL (ref 15–500)
EOSINOPHIL NFR BLD AUTO: 2.5 %
ERYTHROCYTE [DISTWIDTH] IN BLOOD BY AUTOMATED COUNT: 12 % (ref 11–15)
GFR/BSA.PRED SERPLBLD CYS-BASED-ARV: 103 ML/MIN/1.73M2
GLOBULIN SER CALC-MCNC: 2.8 G/DL (CALC) (ref 1.9–3.7)
GLUCOSE SERPL-MCNC: 115 MG/DL (ref 65–99)
HBA1C MFR BLD: 6 % OF TOTAL HGB
HCT VFR BLD AUTO: 45.2 % (ref 38.5–50)
HDLC SERPL-MCNC: 38 MG/DL
HGB BLD-MCNC: 15.1 G/DL (ref 13.2–17.1)
LDLC SERPL CALC-MCNC: 76 MG/DL (CALC)
LYMPHOCYTES # BLD AUTO: 1470 CELLS/UL (ref 850–3900)
LYMPHOCYTES NFR BLD AUTO: 20.7 %
MCH RBC QN AUTO: 30.8 PG (ref 27–33)
MCHC RBC AUTO-ENTMCNC: 33.4 G/DL (ref 32–36)
MCV RBC AUTO: 92.1 FL (ref 80–100)
MONOCYTES # BLD AUTO: 490 CELLS/UL (ref 200–950)
MONOCYTES NFR BLD AUTO: 6.9 %
NEUTROPHILS # BLD AUTO: 4913 CELLS/UL (ref 1500–7800)
NEUTROPHILS NFR BLD AUTO: 69.2 %
NONHDLC SERPL-MCNC: 100 MG/DL (CALC)
PLATELET # BLD AUTO: 234 THOUSAND/UL (ref 140–400)
PMV BLD REES-ECKER: 10.7 FL (ref 7.5–12.5)
POTASSIUM SERPL-SCNC: 4.3 MMOL/L (ref 3.5–5.3)
PROT SERPL-MCNC: 6.7 G/DL (ref 6.1–8.1)
RBC # BLD AUTO: 4.91 MILLION/UL (ref 4.2–5.8)
SODIUM SERPL-SCNC: 138 MMOL/L (ref 135–146)
TRIGL SERPL-MCNC: 141 MG/DL
TSH SERPL-ACNC: 2.62 MIU/L (ref 0.4–4.5)
WBC # BLD AUTO: 7.1 THOUSAND/UL (ref 3.8–10.8)

## 2024-09-25 DIAGNOSIS — E78.5 DYSLIPIDEMIA: ICD-10-CM

## 2024-09-25 RX ORDER — ATORVASTATIN CALCIUM 80 MG/1
TABLET, FILM COATED ORAL
Qty: 30 TABLET | Refills: 5 | Status: SHIPPED | OUTPATIENT
Start: 2024-09-25

## 2024-09-26 ENCOUNTER — OFFICE VISIT (OUTPATIENT)
Age: 59
End: 2024-09-26
Payer: COMMERCIAL

## 2024-09-26 VITALS
SYSTOLIC BLOOD PRESSURE: 142 MMHG | HEIGHT: 74 IN | WEIGHT: 285.6 LBS | OXYGEN SATURATION: 98 % | HEART RATE: 84 BPM | TEMPERATURE: 97.8 F | RESPIRATION RATE: 16 BRPM | DIASTOLIC BLOOD PRESSURE: 88 MMHG | BODY MASS INDEX: 36.65 KG/M2

## 2024-09-26 DIAGNOSIS — I10 PRIMARY HYPERTENSION: ICD-10-CM

## 2024-09-26 DIAGNOSIS — Z00.00 ANNUAL PHYSICAL EXAM: Primary | ICD-10-CM

## 2024-09-26 DIAGNOSIS — I25.10 CORONARY ARTERY DISEASE INVOLVING NATIVE CORONARY ARTERY OF NATIVE HEART WITHOUT ANGINA PECTORIS: ICD-10-CM

## 2024-09-26 DIAGNOSIS — R73.03 PREDIABETES: ICD-10-CM

## 2024-09-26 DIAGNOSIS — Z23 ENCOUNTER FOR IMMUNIZATION: ICD-10-CM

## 2024-09-26 DIAGNOSIS — Z11.59 NEED FOR HEPATITIS C SCREENING TEST: ICD-10-CM

## 2024-09-26 DIAGNOSIS — E66.09 CLASS 2 OBESITY DUE TO EXCESS CALORIES WITHOUT SERIOUS COMORBIDITY WITH BODY MASS INDEX (BMI) OF 36.0 TO 36.9 IN ADULT: ICD-10-CM

## 2024-09-26 DIAGNOSIS — Z11.4 SCREENING FOR HIV (HUMAN IMMUNODEFICIENCY VIRUS): ICD-10-CM

## 2024-09-26 DIAGNOSIS — E66.812 CLASS 2 OBESITY DUE TO EXCESS CALORIES WITHOUT SERIOUS COMORBIDITY WITH BODY MASS INDEX (BMI) OF 36.0 TO 36.9 IN ADULT: ICD-10-CM

## 2024-09-26 PROCEDURE — 90673 RIV3 VACCINE NO PRESERV IM: CPT

## 2024-09-26 PROCEDURE — 90677 PCV20 VACCINE IM: CPT

## 2024-09-26 PROCEDURE — 90472 IMMUNIZATION ADMIN EACH ADD: CPT

## 2024-09-26 PROCEDURE — 99213 OFFICE O/P EST LOW 20 MIN: CPT | Performed by: STUDENT IN AN ORGANIZED HEALTH CARE EDUCATION/TRAINING PROGRAM

## 2024-09-26 PROCEDURE — 99396 PREV VISIT EST AGE 40-64: CPT | Performed by: STUDENT IN AN ORGANIZED HEALTH CARE EDUCATION/TRAINING PROGRAM

## 2024-09-26 PROCEDURE — 90471 IMMUNIZATION ADMIN: CPT

## 2024-09-26 RX ORDER — LOSARTAN POTASSIUM 25 MG/1
12.5 TABLET ORAL DAILY
Qty: 15 TABLET | Refills: 0 | Status: SHIPPED | OUTPATIENT
Start: 2024-09-26 | End: 2024-09-26

## 2024-09-26 RX ORDER — LOSARTAN POTASSIUM 25 MG/1
12.5 TABLET ORAL DAILY
Qty: 15 TABLET | Refills: 2 | Status: SHIPPED | OUTPATIENT
Start: 2024-09-26 | End: 2024-12-25

## 2024-09-26 NOTE — PROGRESS NOTES
Adult Annual Physical  Name: Philippe Zapata      : 1965      MRN: [id number]  Encounter Provider: Herman Ugarte MD  Encounter Date: 2024   Encounter department: [facility]    Assessment & [facility]  Annual physical exam         Primary hypertension  BP Readings from Last 3 Encounters:   24 142/88   24 142/83   24 166/78   Initial /102, 142/88 on recheck  Given cardiac history as well as history of prediabetes, patient would benefit from initiation of ACE or ARB at this point in time  Low-dose losartan prescribed at visit today, return to clinic in 3 months for recheck of BP as well as recheck of renal function      Orders:    Basic metabolic panel; Future    Basic metabolic panel    losartan (COZAAR) 25 mg tablet; Take 0.5 tablets (12.5 mg total) by mouth daily    Prediabetes  Most recent A1c of 6.0, consistent with prediabetes  Patient does endorse fluctuating weight as well as dietary indiscretion post COVID, states that he has not been exercising as much as he would like and has not been minding his diet  Patient counseled on dietary and lifestyle modification at this point in time, refused weight management referral but did consent to 3-month follow-up  Continue to monitor weight with routine office visits, can consider more aggressive intervention as indicated by ongoing medical evaluation  Given cardiac condition may be a good candidate for SGLT2 at some point in future, however we will continue to monitor before prescribing this medication     Encounter for immunization    Orders:    influenza vaccine, recombinant, PF, 0.5 mL IM (Flublok)    Pneumococcal Conjugate Vaccine 20-valent (Pcv20)    Need for hepatitis C screening test    Orders:    Hepatitis C antibody; Future    Screening for HIV (human immunodeficiency virus)    Orders:    HIV 1/2 AG/AB w Reflex [facility] for 2 yr old and above; Future    Class 2 obesity due to excess calories without serious  comorbidity with body mass index (BMI) of 36.0 to 36.9 in adult  Encourage dietary and lifestyle modification  Reviewed proper dietary choices  Offered referrals to nutrition services obesity medicine, patient declined at this point in time  Encourage patient to exercise for 150 minutes/week with suggested method of walking as well as 5 servings of fruits and vegetables     Coronary artery disease involving native coronary artery of native heart without angina pectoris  Lab Results   Component Value Date    CHOLESTEROL 138 09/23/2024    TRIG 141 09/23/2024    HDL 38 (L) 09/23/2024    LDLCALC 76 09/23/2024     Last lipid panel appropriate, well-controlled dyslipidemia at this point in time  Continue current statin therapy  Continue current beta-blockade  Add losartan for RAAS inhibition  If further blood pressure control as needed, can consider addition of diuretic in future  Patient may additionally benefit from updated echo, can order this at upcoming visits if lower extremity edema persists despite blood pressure control and weight loss strategies     Immunizations and preventive care screenings were discussed with patient today. Appropriate education was printed on patient's after visit summary.        Counseling:  Alcohol/drug use: discussed moderation in alcohol intake, the recommendations for healthy alcohol use, and avoidance of illicit drug use.  Dental Health: discussed importance of regular tooth brushing, flossing, and dental visits.  Injury prevention: discussed safety/seat belts, safety helmets, smoke detectors, carbon monoxide detectors, and smoking near bedding or upholstery.  Sexual health: discussed sexually transmitted diseases, partner selection, use of condoms, avoidance of unintended pregnancy, and contraceptive alternatives.  Exercise: the importance of regular exercise/physical activity was discussed. Recommend exercise 3-5 times per week for at least 30 minutes.          History of Present  "Illness     Adult Annual Physical:  Patient presents for annual physical.     Diet and Physical Activity:  - Diet/Nutrition: poor diet and heart healthy (low sodium) diet. snacking before bed  - Exercise: walking, 1-2 times a week on average and 5-7 times a week on average. fell off recently due to feeling unwell    General Health:  - Sleep: 7-8 hours of sleep on average and sleeps well.  - Hearing: normal hearing right ear and normal hearing left ear.  - Vision: wears contacts, goes for regular eye exams and most recent eye exam > 1 year ago.  - Dental: regular dental visits, brushes teeth twice daily and does not floss.     Health:  - History of STDs: no.   - Urinary symptoms: none.     Advanced Care Planning:  - Has an advanced directive?: no    - Has a durable medical POA?: no    - ACP document given to patient?: no      Review of Systems   Constitutional:  Negative for chills and fever.   HENT:  Negative for congestion, ear pain, postnasal drip, rhinorrhea, sinus pressure and sore throat.    Eyes:  Negative for pain and visual disturbance.   Respiratory:  Negative for cough and shortness of breath.    Cardiovascular:  Negative for chest pain and palpitations.   Gastrointestinal:  Negative for abdominal pain and vomiting.   Genitourinary:  Negative for dysuria and hematuria.   Musculoskeletal:  Negative for arthralgias and back pain.   Skin:  Negative for color change and rash.   Neurological:  Negative for seizures and syncope.   All other systems reviewed and are negative.    Pertinent Medical History   No interval medical history       Medical History Reviewed by provider this encounter:  Tobacco  Allergies  Meds  Problems  Med Hx  Surg Hx  Fam Hx         Objective     /88   Pulse 84   Temp 97.8 °F (36.6 °C)   Resp 16   Ht 6' 2\" (1.88 m)   Wt 130 kg (285 lb 9.6 oz)   SpO2 98%   BMI 36.67 kg/m²     Physical Exam  Vitals and nursing note reviewed.   Constitutional:       General: He is not " in acute distress.     Appearance: He is well-developed. He is obese. He is not ill-appearing.   HENT:      Head: Normocephalic and atraumatic.      Right Ear: External ear normal.      Left Ear: External ear normal.      Mouth/Throat:      Mouth: Mucous membranes are moist.      Pharynx: Oropharynx is clear.   Eyes:      General:         Right eye: No discharge.         Left eye: No discharge.      Conjunctiva/sclera: Conjunctivae normal.   Cardiovascular:      Rate and Rhythm: Normal rate and regular rhythm.      Heart sounds: No murmur heard.     No friction rub. No gallop.   Pulmonary:      Effort: Pulmonary effort is normal. No respiratory distress.      Breath sounds: Normal breath sounds. No wheezing, rhonchi or rales.   Abdominal:      General: Abdomen is flat.      Palpations: Abdomen is soft. There is no mass.      Tenderness: There is no abdominal tenderness. There is no guarding.   Musculoskeletal:         General: No swelling.      Cervical back: Neck supple.      Right lower leg: Edema present.      Left lower leg: Edema present.      Comments: Trace LE edema    Skin:     General: Skin is warm and dry.      Capillary Refill: Capillary refill takes less than 2 seconds.   Neurological:      Mental Status: He is alert. Mental status is at baseline.   Psychiatric:         Mood and Affect: Mood normal.

## 2024-09-26 NOTE — ASSESSMENT & PLAN NOTE
Most recent A1c of 6.0, consistent with prediabetes  Patient does endorse fluctuating weight as well as dietary indiscretion post COVID, states that he has not been exercising as much as he would like and has not been minding his diet  Patient counseled on dietary and lifestyle modification at this point in time, refused weight management referral but did consent to 3-month follow-up  Continue to monitor weight with routine office visits, can consider more aggressive intervention as indicated by ongoing medical evaluation  Given cardiac condition may be a good candidate for SGLT2 at some point in future, however we will continue to monitor before prescribing this medication

## 2024-09-26 NOTE — ASSESSMENT & PLAN NOTE
Lab Results   Component Value Date    CHOLESTEROL 138 09/23/2024    TRIG 141 09/23/2024    HDL 38 (L) 09/23/2024    LDLCALC 76 09/23/2024     Last lipid panel appropriate, well-controlled dyslipidemia at this point in time  Continue current statin therapy  Continue current beta-blockade  Add losartan for RAAS inhibition  If further blood pressure control as needed, can consider addition of diuretic in future  Patient may additionally benefit from updated echo, can order this at upcoming visits if lower extremity edema persists despite blood pressure control and weight loss strategies

## 2024-09-26 NOTE — ASSESSMENT & PLAN NOTE
Encourage dietary and lifestyle modification  Reviewed proper dietary choices  Offered referrals to nutrition services obesity medicine, patient declined at this point in time  Encourage patient to exercise for 150 minutes/week with suggested method of walking as well as 5 servings of fruits and vegetables

## 2024-09-26 NOTE — ASSESSMENT & PLAN NOTE
BP Readings from Last 3 Encounters:   09/26/24 142/88   09/19/24 142/83   03/28/24 166/78   Initial /102, 142/88 on recheck  Given cardiac history as well as history of prediabetes, patient would benefit from initiation of ACE or ARB at this point in time  Low-dose losartan prescribed at visit today, return to clinic in 3 months for recheck of BP as well as recheck of renal function      Orders:    Basic metabolic panel; Future    Basic metabolic panel    losartan (COZAAR) 25 mg tablet; Take 0.5 tablets (12.5 mg total) by mouth daily

## 2024-10-25 DIAGNOSIS — I25.10 CAD (CORONARY ARTERY DISEASE): ICD-10-CM

## 2024-10-25 RX ORDER — CARVEDILOL 12.5 MG/1
12.5 TABLET ORAL 2 TIMES DAILY
Qty: 180 TABLET | Refills: 0 | Status: SHIPPED | OUTPATIENT
Start: 2024-10-25

## 2024-10-25 NOTE — TELEPHONE ENCOUNTER
Patient called to request a refill for their carvedilol  advised a refill was requested on 10/25/2024 and is pending approval. Patient verbalized understanding and is in agreement.     Pt is almost out.

## 2024-10-29 DIAGNOSIS — I25.10 CAD (CORONARY ARTERY DISEASE): ICD-10-CM

## 2024-10-30 RX ORDER — CARVEDILOL 12.5 MG/1
12.5 TABLET ORAL 2 TIMES DAILY
Qty: 60 TABLET | Refills: 0 | OUTPATIENT
Start: 2024-10-30

## 2024-12-22 DIAGNOSIS — I10 PRIMARY HYPERTENSION: ICD-10-CM

## 2024-12-23 ENCOUNTER — RA CDI HCC (OUTPATIENT)
Dept: OTHER | Facility: HOSPITAL | Age: 59
End: 2024-12-23

## 2024-12-23 ENCOUNTER — APPOINTMENT (OUTPATIENT)
Dept: LAB | Facility: CLINIC | Age: 59
End: 2024-12-23
Payer: COMMERCIAL

## 2024-12-23 DIAGNOSIS — Z11.59 NEED FOR HEPATITIS C SCREENING TEST: ICD-10-CM

## 2024-12-23 DIAGNOSIS — I25.10 CORONARY ARTERY DISEASE INVOLVING NATIVE CORONARY ARTERY OF NATIVE HEART WITHOUT ANGINA PECTORIS: ICD-10-CM

## 2024-12-23 DIAGNOSIS — Z11.4 SCREENING FOR HIV (HUMAN IMMUNODEFICIENCY VIRUS): ICD-10-CM

## 2024-12-23 DIAGNOSIS — I10 PRIMARY HYPERTENSION: ICD-10-CM

## 2024-12-23 DIAGNOSIS — E78.5 DYSLIPIDEMIA: ICD-10-CM

## 2024-12-23 LAB
ANION GAP SERPL CALCULATED.3IONS-SCNC: 6 MMOL/L (ref 4–13)
BUN SERPL-MCNC: 21 MG/DL (ref 5–25)
CALCIUM SERPL-MCNC: 9.1 MG/DL (ref 8.4–10.2)
CHLORIDE SERPL-SCNC: 105 MMOL/L (ref 96–108)
CO2 SERPL-SCNC: 28 MMOL/L (ref 21–32)
CREAT SERPL-MCNC: 0.96 MG/DL (ref 0.6–1.3)
EST. AVERAGE GLUCOSE BLD GHB EST-MCNC: 126 MG/DL
GFR SERPL CREATININE-BSD FRML MDRD: 87 ML/MIN/1.73SQ M
GLUCOSE P FAST SERPL-MCNC: 110 MG/DL (ref 65–99)
HBA1C MFR BLD: 6 %
HCV AB SER QL: NORMAL
HIV 1+2 AB+HIV1 P24 AG SERPL QL IA: NORMAL
HIV 2 AB SERPL QL IA: NORMAL
HIV1 AB SERPL QL IA: NORMAL
HIV1 P24 AG SERPL QL IA: NORMAL
POTASSIUM SERPL-SCNC: 4.3 MMOL/L (ref 3.5–5.3)
SODIUM SERPL-SCNC: 139 MMOL/L (ref 135–147)

## 2024-12-23 PROCEDURE — 83036 HEMOGLOBIN GLYCOSYLATED A1C: CPT

## 2024-12-23 PROCEDURE — 87389 HIV-1 AG W/HIV-1&-2 AB AG IA: CPT

## 2024-12-23 PROCEDURE — 86803 HEPATITIS C AB TEST: CPT

## 2024-12-23 PROCEDURE — 36415 COLL VENOUS BLD VENIPUNCTURE: CPT

## 2024-12-23 PROCEDURE — 80048 BASIC METABOLIC PNL TOTAL CA: CPT

## 2024-12-24 RX ORDER — LOSARTAN POTASSIUM 25 MG/1
12.5 TABLET ORAL DAILY
Qty: 15 TABLET | Refills: 2 | Status: SHIPPED | OUTPATIENT
Start: 2024-12-24

## 2024-12-30 ENCOUNTER — HOSPITAL ENCOUNTER (OUTPATIENT)
Dept: RADIOLOGY | Facility: HOSPITAL | Age: 59
Discharge: HOME | End: 2024-12-30
Payer: COMMERCIAL

## 2024-12-30 ENCOUNTER — OFFICE VISIT (OUTPATIENT)
Age: 59
End: 2024-12-30
Payer: COMMERCIAL

## 2024-12-30 VITALS
SYSTOLIC BLOOD PRESSURE: 152 MMHG | BODY MASS INDEX: 38.81 KG/M2 | WEIGHT: 302.4 LBS | HEART RATE: 71 BPM | DIASTOLIC BLOOD PRESSURE: 92 MMHG | TEMPERATURE: 98 F | OXYGEN SATURATION: 96 % | HEIGHT: 74 IN

## 2024-12-30 DIAGNOSIS — M20.41 HAMMER TOE OF RIGHT FOOT: ICD-10-CM

## 2024-12-30 DIAGNOSIS — M79.674 TOE PAIN, RIGHT: ICD-10-CM

## 2024-12-30 DIAGNOSIS — I10 PRIMARY HYPERTENSION: Primary | ICD-10-CM

## 2024-12-30 DIAGNOSIS — I25.10 CORONARY ARTERY DISEASE INVOLVING NATIVE CORONARY ARTERY OF NATIVE HEART WITHOUT ANGINA PECTORIS: ICD-10-CM

## 2024-12-30 PROCEDURE — 99213 OFFICE O/P EST LOW 20 MIN: CPT

## 2024-12-30 PROCEDURE — 73660 X-RAY EXAM OF TOE(S): CPT

## 2024-12-30 PROCEDURE — 73630 X-RAY EXAM OF FOOT: CPT

## 2024-12-30 RX ORDER — LOSARTAN POTASSIUM 50 MG/1
50 TABLET ORAL DAILY
Qty: 90 TABLET | Refills: 0 | Status: SHIPPED | OUTPATIENT
Start: 2024-12-30

## 2024-12-30 NOTE — ASSESSMENT & PLAN NOTE
BP Readings from Last 1 Encounters:   12/30/24 152/92     Blood pressure above goal of less than 140 over less than 90  At last visit, patient was initiated on losartan 12.5 mg daily with BMP and recheck in approximately 3 months.  Blood pressure at visit today suboptimal, patient endorses taking medication as prescribed and also endorses significant weight gain due to below listed problem with toe pain making it difficult for him to exercise.    Will increase losartan to 50 mg daily, repeat BMP and BP recheck in approximately 1 month.  Orders:    losartan (COZAAR) 50 mg tablet; Take 1 tablet (50 mg total) by mouth daily    Basic metabolic panel; Future

## 2024-12-30 NOTE — PROGRESS NOTES
Name: Philippe Zapata      : 1965      MRN: [id number]  Encounter Provider: Herman Ugarte MD  Encounter Date: 2024   Encounter department: [facility]    Assessment & Plan  Primary hypertension  BP Readings from Last 1 Encounters:   24 152/92     Blood pressure above goal of less than 140 over less than 90  At last visit, patient was initiated on losartan 12.5 mg daily with BMP and recheck in approximately 3 months.  Blood pressure at visit today suboptimal, patient endorses taking medication as prescribed and also endorses significant weight gain due to below listed problem with toe pain making it difficult for him to exercise.    Will increase losartan to 50 mg daily, repeat BMP and BP recheck in approximately 1 month.  Orders:    losartan (COZAAR) 50 mg tablet; Take 1 tablet (50 mg total) by mouth daily    Basic metabolic panel; Future    Toe pain, right  Patient endorses pain of right second toe progressive since roughly October.  Patient states the pain is worsened by walking and improved with rest.  Patient states the pain is present in the ball of his foot with radiation towards heel, worsened with direct pressure but denies any numbness, tingling, bruising, discoloration, or other similar signs or symptoms.    Physical examination of foot demonstrates hammertoe of right second toe.  Likely culprit in his current pain, advised patient on symptomatic management with Tylenol and avoidance of NSAIDs at this time given up titration of BP regimen and close monitoring of renal function.      Referral to podiatry placed as well, patient may benefit from orthotics versus steroid injection versus other management.  X-rays of foot and toe ordered to rule out occult stress fracture versus other bony derangement.  Return to office in 1 month for recheck of above listed problems as well as pain of toe.  Orders:    Ambulatory Referral to Podiatry; Future    XR foot 2 vw right;  Future    XR toe right second min 2 views; Future    Hammer toe of right foot  See problem of right toe pain for more information  Orders:    Ambulatory Referral to Podiatry; Future    XR foot 2 vw right; Future    XR toe right second min 2 views; Future    Coronary artery disease involving native coronary artery of native heart without angina pectoris  Encourage close follow-up with cardiology, increase losartan to 50 mg daily for tighter BP control.  Can consider updated echo at upcoming appointment if control remains suboptimal on expanded regimen.            History of Present Illness     Endorses pain of toe of right foot  Did recently increase his walking, endorses proper footwear   States it is a dull pain that has been present since mid October, but has progressively increased in that time  Dull pain, radiates to ball of foot   Worsened by walking, improved by nothing       Review of Systems   Constitutional:  Negative for chills and fever.   HENT:  Negative for ear pain and sore throat.    Eyes:  Negative for pain and visual disturbance.   Respiratory:  Negative for cough and shortness of breath.    Cardiovascular:  Negative for chest pain and palpitations.   Gastrointestinal:  Negative for abdominal pain and vomiting.   Genitourinary:  Negative for dysuria and hematuria.   Musculoskeletal:  Negative for arthralgias and back pain.   Skin:  Negative for color change and rash.   Neurological:  Negative for seizures and syncope.   All other systems reviewed and are negative.    Past Medical History:   Diagnosis Date    CAD (coronary artery disease)     Dyslipidemia     Ear problems     Hyperlipidemia     Hypertension     Obesity     Tear of biceps tendon 3/8/2022     Past Surgical History:   Procedure Laterality Date    ADENOIDECTOMY      COLONOSCOPY N/A 2/19/2019    Procedure: COLONOSCOPY;  Surgeon: Danelle Mena MD;  Location: [facility];  Service: Gastroenterology    CORONARY ANGIOPLASTY WITH STENT  "PLACEMENT      CORONARY ARTERY BYPASS GRAFT       Family History   Problem Relation Age of Onset    Diabetes Mother         on the mother's side    Liver cancer Father     Stomach cancer Father     Heart failure Maternal Grandmother     Heart attack Maternal Grandfather     Hypertension Family     Hyperlipidemia Neg Hx     Anuerysm Neg Hx     Arrhythmia Neg Hx     Sudden death Neg Hx     Stroke Neg Hx     Clotting disorder Neg Hx      Social History     Tobacco Use    Smoking status: Never    Smokeless tobacco: Never   Vaping Use    Vaping status: Never Used   Substance and Sexual Activity    Alcohol use: Yes     Comment: 2 per week    Drug use: No    Sexual activity: Not on file     Current Outpatient Medications on File Prior to Visit   Medication Sig    aspirin (ECOTRIN LOW STRENGTH) 81 mg EC tablet Take 81 mg by mouth daily    atorvastatin (LIPITOR) 80 mg tablet TAKE 1 TABLET BY MOUTH EVERY DAY    carvedilol (COREG) 12.5 mg tablet Take 1 tablet (12.5 mg total) by mouth 2 (two) times a day    [DISCONTINUED] losartan (COZAAR) 25 mg tablet TAKE 1/2 TABLET BY MOUTH EVERY DAY    [DISCONTINUED] cetirizine (ZyrTEC) 5 MG tablet Take 1 tablet (5 mg total) by mouth daily    [DISCONTINUED] fluticasone (FLONASE) 50 mcg/act nasal spray 2 sprays into each nostril daily     No Known Allergies  Immunization History   Administered Date(s) Administered    COVID-19 PFIZER VACCINE 0.3 ML IM 03/16/2021, 04/08/2021, 11/17/2021    INFLUENZA 11/17/2021    Influenza Recombinant Preservative Free Im 09/26/2024    Influenza, injectable, quadrivalent, preservative free 0.5 mL 10/28/2019    Influenza, recombinant, quadrivalent,injectable, preservative free 12/11/2018    Pneumococcal Conjugate Vaccine 20-valent (Pcv20), Polysace 09/26/2024    Pneumococcal Polysaccharide PPV23 12/11/2018    Tdap 03/08/2011, 07/09/2021     Objective   /92   Pulse 71   Temp 98 °F (36.7 °C)   Ht 6' 2\" (1.88 m)   Wt (!) 137 kg (302 lb 6.4 oz)   SpO2 " 96%   BMI 38.83 kg/m²     Physical Exam  Cardiovascular:      Pulses:           Dorsalis pedis pulses are 3+ on the right side.        Posterior tibial pulses are 3+ on the right side.   Musculoskeletal:      Right foot: Normal range of motion. Deformity present. No bunion, Charcot foot, foot drop or prominent metatarsal heads.   Feet:      Right foot:      Skin integrity: Callus present. No warmth.      Comments: Hammertoe of right second toe with mild tenderness to palpation of metatarsal heads

## 2024-12-30 NOTE — ASSESSMENT & PLAN NOTE
Encourage close follow-up with cardiology, increase losartan to 50 mg daily for tighter BP control.  Can consider updated echo at upcoming appointment if control remains suboptimal on expanded regimen.

## 2024-12-31 ENCOUNTER — RESULTS FOLLOW-UP (OUTPATIENT)
Age: 59
End: 2024-12-31

## 2025-01-16 ENCOUNTER — OFFICE VISIT (OUTPATIENT)
Dept: PODIATRY | Facility: CLINIC | Age: 60
End: 2025-01-16
Payer: COMMERCIAL

## 2025-01-16 VITALS — BODY MASS INDEX: 39.01 KG/M2 | HEIGHT: 74 IN | WEIGHT: 304 LBS

## 2025-01-16 DIAGNOSIS — M25.871 PREDISLOCATION SYNDROME OF METATARSOPHALANGEAL JOINT OF RIGHT FOOT: Primary | ICD-10-CM

## 2025-01-16 DIAGNOSIS — M20.41 HAMMER TOE OF RIGHT FOOT: ICD-10-CM

## 2025-01-16 DIAGNOSIS — M79.674 TOE PAIN, RIGHT: ICD-10-CM

## 2025-01-16 PROCEDURE — 99203 OFFICE O/P NEW LOW 30 MIN: CPT | Performed by: PODIATRIST

## 2025-01-16 RX ORDER — METHYLPREDNISOLONE 4 MG/1
TABLET ORAL
Qty: 1 EACH | Refills: 0 | Status: SHIPPED | OUTPATIENT
Start: 2025-01-16

## 2025-01-16 NOTE — PATIENT INSTRUCTIONS
My recommendation for over-the-counter inserts for you are:    PowerStep [place] Insoles    These can be obtained directly from the  at [email or web address]    You can also find these online at [employer], [employer] and other retailers.

## 2025-01-16 NOTE — PROGRESS NOTES
Name: Philippe Zapata      : 1965      MRN: [id number]  Encounter Provider: Elmer Ritter DPM  Encounter Date: 2025   Encounter department: [facility] [place]    Assessment & Plan     1. Predislocation syndrome of metatarsophalangeal joint of right foot  -     methylPREDNISolone 4 MG tablet therapy pack; Use as directed on package  2. Toe pain, right  -     Ambulatory Referral to Podiatry  -     methylPREDNISolone 4 MG tablet therapy pack; Use as directed on package  3. Hammer toe of right foot  -     Ambulatory Referral to Podiatry  -     methylPREDNISolone 4 MG tablet therapy pack; Use as directed on package      Patient's symptoms appear consistent with predislocation syndrome of the second metatarsophalangeal joint.    Will tape the toe in plantarflexion position described the way to do this to the patient.    Gave him a Medrol Dosepak to help with the swelling and discomfort.  Recommendations for power steps for the patient.    Will have patient return for reevaluation in 4 weeks to see how he is doing at that time.  We can consider repeating the x-ray if he is not having any improvement if he does continue to have discomfort MRI could be indicated.    Return in about 4 weeks (around 2025).    Subjective     Location: Right foot 2nd toe  Type of pain: sharp pain  Duration and Onset: October   Aggravating factors: pressure and weight bearing  Treatment so far: less walking    Was doing a lot of walking and had some feeling of bruising and discomfort to the area.  Has pain where the second toe MPJ.      Patient was referred by his primary care doctor for evaluation due to foot pain.  There was a question of with the patient would benefit from orthotics versus steroid injection versus other management.  X-rays were placed. On 2024.  There were no acute fractures or dislocations noted there was hammertoe deformity noted on multiple digits on the nonweightbearing  "examination.  There is plantar calcaneal spurring and posterior insertional spur noted on x-ray examination.  There is also a toe x-ray taken which did show hammertoe contracture noted to this area some mild osteoarthritic changes noted at the level of the interphalangeal joints.         I reviewed the x-rays that were taken of the right foot      Constitutional:  Negative for chills and fever.   Respiratory:  Negative for chest tightness and shortness of breath.    Gastrointestinal:  Negative for nausea and vomiting.     Current Outpatient Medications on File Prior to Visit   Medication Sig   • aspirin (ECOTRIN LOW STRENGTH) 81 mg EC tablet Take 81 mg by mouth daily   • atorvastatin (LIPITOR) 80 mg tablet TAKE 1 TABLET BY MOUTH EVERY DAY   • carvedilol (COREG) 12.5 mg tablet Take 1 tablet (12.5 mg total) by mouth 2 (two) times a day   • losartan (COZAAR) 50 mg tablet Take 1 tablet (50 mg total) by mouth daily       Objective     Ht 6' 2\" (1.88 m)   Wt (!) 138 kg (304 lb)   BMI 39.03 kg/m²     Vascular: Intact pedal pulses bilateral DP and PT.  Neurological: Gross protective sensation intact bilateral  Musculoskeletal: Muscle strength bilateral intact with dorsiflexion, inversion, eversion and plantarflexion.  There is hammertoe contractures noted to the lesser digits.  There is predislocation syndrome noted at the level of second metatarsophalangeal joint.  Mild lateral deviation of the great toe.  There is tenderness on palpation noted at the level of the metatarsophalangeal joint plantarly at the second metatarsophalangeal joint.  There is no tenderness with palpation at the interspaces.  There is some discomfort noted to palpation on the dorsal aspect of second digit as well at the proximal interphalangeal joint.  Dermatological: No open lesions or ulcerations noted bilateral.    "

## 2025-01-23 DIAGNOSIS — I25.10 CAD (CORONARY ARTERY DISEASE): ICD-10-CM

## 2025-01-23 RX ORDER — CARVEDILOL 12.5 MG/1
12.5 TABLET ORAL 2 TIMES DAILY
Qty: 180 TABLET | Refills: 0 | Status: SHIPPED | OUTPATIENT
Start: 2025-01-23

## 2025-01-30 ENCOUNTER — RA CDI HCC (OUTPATIENT)
Dept: OTHER | Facility: HOSPITAL | Age: 60
End: 2025-01-30

## 2025-01-31 ENCOUNTER — OFFICE VISIT (OUTPATIENT)
Age: 60
End: 2025-01-31
Payer: COMMERCIAL

## 2025-01-31 VITALS
HEIGHT: 74 IN | TEMPERATURE: 97.8 F | DIASTOLIC BLOOD PRESSURE: 88 MMHG | SYSTOLIC BLOOD PRESSURE: 142 MMHG | WEIGHT: 300.6 LBS | BODY MASS INDEX: 38.58 KG/M2 | HEART RATE: 71 BPM | OXYGEN SATURATION: 98 %

## 2025-01-31 DIAGNOSIS — I10 PRIMARY HYPERTENSION: Primary | ICD-10-CM

## 2025-01-31 PROCEDURE — 99213 OFFICE O/P EST LOW 20 MIN: CPT

## 2025-01-31 RX ORDER — LOSARTAN POTASSIUM 100 MG/1
100 TABLET ORAL DAILY
Qty: 90 TABLET | Refills: 0 | Status: SHIPPED | OUTPATIENT
Start: 2025-01-31

## 2025-01-31 NOTE — PROGRESS NOTES
Name: Philippe Zapata      : 1965      MRN: [id number]  Encounter Provider: Herman Ugarte MD  Encounter Date: 2025   Encounter department: [facility]    Assessment & Plan  Primary hypertension  BP Readings from Last 1 Encounters:   25 142/88     Blood pressure above goal of less than 140 over less than 90, patient currently taking losartan 50 mg daily and is tolerating the medication without issue  Patient has not yet completed repeat BMP ordered at previous visit    Plan:  Increase losartan to 100 mg daily  Patient advised to take this medication and get repeat BMP in 2 weeks to assess for renal tolerance of this medication  Return to office in 4 weeks for recheck of BP and review of labs  Orders:    losartan (COZAAR) 100 MG tablet; Take 1 tablet (100 mg total) by mouth daily    Basic metabolic panel; Future         History of Present Illness     Patient is a 59-year-old male who presents for BP recheck and med tolerance      Review of Systems   Constitutional:  Negative for chills and fever.   HENT:  Negative for ear pain and sore throat.    Eyes:  Negative for pain and visual disturbance.   Respiratory:  Negative for cough and shortness of breath.    Cardiovascular:  Negative for chest pain and palpitations.   Gastrointestinal:  Negative for abdominal pain and vomiting.   Genitourinary:  Negative for dysuria and hematuria.   Musculoskeletal:  Negative for arthralgias and back pain.   Skin:  Negative for color change and rash.   Neurological:  Negative for seizures and syncope.   All other systems reviewed and are negative.    Past Medical History:   Diagnosis Date    CAD (coronary artery disease)     Dyslipidemia     Ear problems     Hyperlipidemia     Hypertension     Obesity     Tear of biceps tendon 3/8/2022     Past Surgical History:   Procedure Laterality Date    ADENOIDECTOMY      COLONOSCOPY N/A 2019    Procedure: COLONOSCOPY;  Surgeon: Danelle Mena MD;   Location: [facility];  Service: Gastroenterology    CORONARY ANGIOPLASTY WITH STENT PLACEMENT      CORONARY ARTERY BYPASS GRAFT       Family History   Problem Relation Age of Onset    Diabetes Mother         on the mother's side    Liver cancer Father     Stomach cancer Father     Heart failure Maternal Grandmother     Heart attack Maternal Grandfather     Hypertension Family     Hyperlipidemia Neg Hx     Anuerysm Neg Hx     Arrhythmia Neg Hx     Sudden death Neg Hx     Stroke Neg Hx     Clotting disorder Neg Hx      Social History     Tobacco Use    Smoking status: Never    Smokeless tobacco: Never   Vaping Use    Vaping status: Never Used   Substance and Sexual Activity    Alcohol use: Yes     Comment: 2 per week    Drug use: No    Sexual activity: Not on file     Current Outpatient Medications on File Prior to Visit   Medication Sig    aspirin (ECOTRIN LOW STRENGTH) 81 mg EC tablet Take 81 mg by mouth daily    atorvastatin (LIPITOR) 80 mg tablet TAKE 1 TABLET BY MOUTH EVERY DAY    carvedilol (COREG) 12.5 mg tablet TAKE 1 TABLET BY MOUTH 2 TIMES A DAY.    losartan (COZAAR) 50 mg tablet Take 1 tablet (50 mg total) by mouth daily    methylPREDNISolone 4 MG tablet therapy pack Use as directed on package     No Known Allergies  Immunization History   Administered Date(s) Administered    COVID-19 PFIZER VACCINE 0.3 ML IM 03/16/2021, 04/08/2021, 11/17/2021    INFLUENZA 11/17/2021    Influenza Recombinant Preservative Free Im 09/26/2024    Influenza, injectable, quadrivalent, preservative free 0.5 mL 10/28/2019    Influenza, recombinant, quadrivalent,injectable, preservative free 12/11/2018    Pneumococcal Conjugate Vaccine 20-valent (Pcv20), Polysace 09/26/2024    Pneumococcal Polysaccharide PPV23 12/11/2018    Tdap 03/08/2011, 07/09/2021     Objective   There were no vitals taken for this visit.    Physical Exam  Vitals and nursing note reviewed.   Constitutional:       General: He is not in acute distress.      Appearance: He is well-developed. He is obese. He is not ill-appearing.   HENT:      Head: Normocephalic and atraumatic.      Right Ear: External ear normal.      Left Ear: External ear normal.      Mouth/Throat:      Mouth: Mucous membranes are moist.      Pharynx: Oropharynx is clear.   Eyes:      General:         Right eye: No discharge.         Left eye: No discharge.      Conjunctiva/sclera: Conjunctivae normal.   Cardiovascular:      Rate and Rhythm: Normal rate and regular rhythm.      Heart sounds: No murmur heard.     No friction rub. No gallop.   Pulmonary:      Effort: Pulmonary effort is normal. No respiratory distress.      Breath sounds: Normal breath sounds. No wheezing, rhonchi or rales.   Abdominal:      General: Abdomen is flat.      Palpations: Abdomen is soft. There is no mass.      Tenderness: There is no abdominal tenderness. There is no guarding.   Musculoskeletal:         General: No swelling.      Cervical back: Neck supple.      Right lower leg: No edema.      Left lower leg: No edema.   Skin:     General: Skin is warm and dry.      Capillary Refill: Capillary refill takes less than 2 seconds.   Neurological:      Mental Status: He is alert. Mental status is at baseline.   Psychiatric:         Mood and Affect: Mood normal.

## 2025-01-31 NOTE — ASSESSMENT & PLAN NOTE
BP Readings from Last 1 Encounters:   01/31/25 142/88     Blood pressure above goal of less than 140 over less than 90, patient currently taking losartan 50 mg daily and is tolerating the medication without issue  Patient has not yet completed repeat BMP ordered at previous visit    Plan:  Increase losartan to 100 mg daily  Patient advised to take this medication and get repeat BMP in 2 weeks to assess for renal tolerance of this medication  Return to office in 4 weeks for recheck of BP and review of labs  Orders:    losartan (COZAAR) 100 MG tablet; Take 1 tablet (100 mg total) by mouth daily    Basic metabolic panel; Future

## 2025-02-12 ENCOUNTER — OFFICE VISIT (OUTPATIENT)
Dept: PODIATRY | Facility: CLINIC | Age: 60
End: 2025-02-12
Payer: COMMERCIAL

## 2025-02-12 VITALS — WEIGHT: 304 LBS | HEIGHT: 74 IN | BODY MASS INDEX: 39.01 KG/M2

## 2025-02-12 DIAGNOSIS — M20.41 HAMMER TOE OF RIGHT FOOT: ICD-10-CM

## 2025-02-12 DIAGNOSIS — M25.871 PREDISLOCATION SYNDROME OF METATARSOPHALANGEAL JOINT OF RIGHT FOOT: ICD-10-CM

## 2025-02-12 DIAGNOSIS — M20.11 HAV (HALLUX ABDUCTO VALGUS), RIGHT: ICD-10-CM

## 2025-02-12 DIAGNOSIS — M79.674 TOE PAIN, RIGHT: Primary | ICD-10-CM

## 2025-02-12 PROCEDURE — 99213 OFFICE O/P EST LOW 20 MIN: CPT | Performed by: PODIATRIST

## 2025-02-12 PROCEDURE — 20600 DRAIN/INJ JOINT/BURSA W/O US: CPT | Performed by: PODIATRIST

## 2025-02-12 RX ORDER — DEXAMETHASONE SODIUM PHOSPHATE 4 MG/ML
4 INJECTION, SOLUTION INTRA-ARTICULAR; INTRALESIONAL; INTRAMUSCULAR; INTRAVENOUS; SOFT TISSUE ONCE
Status: COMPLETED | OUTPATIENT
Start: 2025-02-12 | End: 2025-02-12

## 2025-02-12 RX ORDER — LIDOCAINE HYDROCHLORIDE 10 MG/ML
1 INJECTION, SOLUTION EPIDURAL; INFILTRATION; INTRACAUDAL; PERINEURAL ONCE
Status: COMPLETED | OUTPATIENT
Start: 2025-02-12 | End: 2025-02-12

## 2025-02-12 RX ADMIN — LIDOCAINE HYDROCHLORIDE 1 ML: 10 INJECTION, SOLUTION EPIDURAL; INFILTRATION; INTRACAUDAL; PERINEURAL at 11:44

## 2025-02-12 RX ADMIN — DEXAMETHASONE SODIUM PHOSPHATE 4 MG: 4 INJECTION, SOLUTION INTRA-ARTICULAR; INTRALESIONAL; INTRAMUSCULAR; INTRAVENOUS; SOFT TISSUE at 11:33

## 2025-02-12 NOTE — PROGRESS NOTES
Name: Philippe Zapata      : 1965      MRN: [id number]  Encounter Provider: Elmer Ritter DPM  Encounter Date: 2025   Encounter department: [facility] [place]    Assessment & Plan     1. Toe pain, right  -     XR foot 3+ vw right  -     Small joint arthrocentesis: R second MTP  -     lidocaine (PF) (XYLOCAINE-MPF) 1 % injection 1 mL  -     dexamethasone (DECADRON) injection 4 mg  2. Hammer toe of right foot  -     XR foot 3+ vw right  3. Predislocation syndrome of metatarsophalangeal joint of right foot  -     XR foot 3+ vw right  -     Small joint arthrocentesis: R second MTP  -     lidocaine (PF) (XYLOCAINE-MPF) 1 % injection 1 mL  -     dexamethasone (DECADRON) injection 4 mg  4. Hav (hallux abducto valgus), right    My personal interpretation today of the x-rays that were taken show X-rays reviewed today did not show any acute fractures or dislocations noted no stress fracture noted at the level of the second tarsal lateral view does show hammertoe contracture noted with mild subluxation at the level of the second metatarsophalangeal joint.    Small joint arthrocentesis: R second MTP  Universal Protocol:  procedure performed by consultantConsent: Verbal consent obtained.  Risks and benefits: risks, benefits and alternatives were discussed  Consent given by: patient  Patient understanding: patient states understanding of the procedure being performed  Patient identity confirmed: verbally with patient  Supporting Documentation  Indications: pain   Procedure Details  Location: second toe - R second MTP  Preparation: Patient was prepped and draped in the usual sterile fashion  Needle size: 25 G  Ultrasound guidance: no  Approach: medial    Patient tolerance: patient tolerated the procedure well with no immediate complications  Dressing:  Sterile dressing applied    Area was prepped with alcohol wipe.  Ethyl Chloride was sprayed on skin to aid in pain relief with  "injection.  Hemostasis achieved to the area.  Injection completed without incident, dry sterile dressing applied.  Patient should leave this intact for twenty four hours and then can remove. If the patient notices any redness, swelling or any signs or symptoms of infection notify the office immediately.          Discussed with patient if no improvement we could consider surgical management if needed.  In addition consider repeating steroid injection in about 6 to 8 weeks if needed consider this.          Return in about 2 months (around 4/12/2025).    Subjective     Pain started in October to the 2nd MPJ area.  Has walked 4 miles per day.  November continued to worsen and give pain to the area.   Patient only took about 2 of the steroids because he did not feel strange.  He denies any other new acute changes.         Constitutional:  Negative for chills and fever.   Respiratory:  Negative for chest tightness and shortness of breath.    Gastrointestinal:  Negative for nausea and vomiting.     Current Outpatient Medications on File Prior to Visit   Medication Sig   • aspirin (ECOTRIN LOW STRENGTH) 81 mg EC tablet Take 81 mg by mouth daily   • atorvastatin (LIPITOR) 80 mg tablet TAKE 1 TABLET BY MOUTH EVERY DAY   • carvedilol (COREG) 12.5 mg tablet TAKE 1 TABLET BY MOUTH 2 TIMES A DAY.   • losartan (COZAAR) 100 MG tablet Take 1 tablet (100 mg total) by mouth daily   • [DISCONTINUED] methylPREDNISolone 4 MG tablet therapy pack Use as directed on package (Patient not taking: Reported on 2/12/2025)       Objective     Ht 6' 2\" (1.88 m)   Wt (!) 138 kg (304 lb)   BMI 39.03 kg/m²     Patient has pain located at the level of the second metatarsophalangeal joint on the right foot.  There is no tenderness noted to the second or third interspace.  There is some discomfort noted on palpation on the plantar surface there is hammertoe contractures that are noted with lateral deviation of the great toe placing pressure on the " second digit.  There is intact pedal pulses.  Neurological sensation is grossly intact distally.  No other areas of acute discomfort or tenderness noted at this time.

## 2025-02-13 ENCOUNTER — OFFICE VISIT (OUTPATIENT)
Dept: CARDIOLOGY CLINIC | Facility: CLINIC | Age: 60
End: 2025-02-13
Payer: COMMERCIAL

## 2025-02-13 VITALS
HEIGHT: 74 IN | BODY MASS INDEX: 39.14 KG/M2 | WEIGHT: 305 LBS | HEART RATE: 72 BPM | SYSTOLIC BLOOD PRESSURE: 146 MMHG | DIASTOLIC BLOOD PRESSURE: 90 MMHG

## 2025-02-13 DIAGNOSIS — E78.5 DYSLIPIDEMIA: ICD-10-CM

## 2025-02-13 DIAGNOSIS — I25.10 CORONARY ARTERY DISEASE INVOLVING NATIVE CORONARY ARTERY OF NATIVE HEART WITHOUT ANGINA PECTORIS: Primary | ICD-10-CM

## 2025-02-13 DIAGNOSIS — I10 PRIMARY HYPERTENSION: ICD-10-CM

## 2025-02-13 PROCEDURE — 99214 OFFICE O/P EST MOD 30 MIN: CPT | Performed by: INTERNAL MEDICINE

## 2025-02-13 NOTE — PROGRESS NOTES
Cardiology Follow Up    Philippe Zapata  1965  [id number]  [facility] [address]  [address] [address]  [phone]    1. Coronary artery disease involving native coronary artery of native heart without angina pectoris        2. Dyslipidemia        3. Primary hypertension            Interval History: Followup CAD.    No chest pain, dyspnea or palpitations.     Medical Problems       Problem List       Hypertension    Acute ST elevation myocardial infarction (STEMI) involving right coronary artery (HCC)    Atherosclerosis    Coronary artery disease involving native coronary artery of native heart without angina pectoris    Dyslipidemia    Fatigue    Fatty liver    Non-ST elevation (NSTEMI) myocardial infarction (HCC)    Obesity    Prediabetes        Past Medical History:   Diagnosis Date    CAD (coronary artery disease)     Dyslipidemia     Ear problems     Hyperlipidemia     Hypertension     Obesity     Tear of biceps tendon 3/8/2022     Social History     Socioeconomic History    Marital status: /Civil Union     Spouse name: Not on file    Number of children: Not on file    Years of education: Not on file    Highest education level: Not on file   Occupational History    Occupation:    Tobacco Use    Smoking status: Never    Smokeless tobacco: Never   Vaping Use    Vaping status: Never Used   Substance and Sexual Activity    Alcohol use: Yes     Comment: 2 per week    Drug use: No    Sexual activity: Not on file   Other Topics Concern    Not on file   Social History Narrative    Not on file     Social Drivers of Health     Financial Resource Strain: Not on file   Food Insecurity: Not on file   Transportation Needs: Not on file   Physical Activity: Not on file   Stress: Not on file   Social Connections: Not on file   Intimate Partner Violence: Not on file   Housing Stability: Not on file      Family  History   Problem Relation Age of Onset    Diabetes Mother         on the mother's side    Liver cancer Father     Stomach cancer Father     Heart failure Maternal Grandmother     Heart attack Maternal Grandfather     Hypertension Family     Hyperlipidemia Neg Hx     Anuerysm Neg Hx     Arrhythmia Neg Hx     Sudden death Neg Hx     Stroke Neg Hx     Clotting disorder Neg Hx      Past Surgical History:   Procedure Laterality Date    ADENOIDECTOMY      COLONOSCOPY N/A 2/19/2019    Procedure: COLONOSCOPY;  Surgeon: Danelle Mena MD;  Location: [facility];  Service: Gastroenterology    CORONARY ANGIOPLASTY WITH STENT PLACEMENT      CORONARY ARTERY BYPASS GRAFT         Current Outpatient Medications:     aspirin (ECOTRIN LOW STRENGTH) 81 mg EC tablet, Take 81 mg by mouth daily, Disp: , Rfl:     atorvastatin (LIPITOR) 80 mg tablet, TAKE 1 TABLET BY MOUTH EVERY DAY, Disp: 30 tablet, Rfl: 5    carvedilol (COREG) 12.5 mg tablet, TAKE 1 TABLET BY MOUTH 2 TIMES A DAY., Disp: 180 tablet, Rfl: 0    losartan (COZAAR) 100 MG tablet, Take 1 tablet (100 mg total) by mouth daily, Disp: 90 tablet, Rfl: 0  No Known Allergies    Labs:     Chemistry        Component Value Date/Time     05/02/2017 0803     05/02/2017 0803    K 4.3 12/23/2024 0752    K 4.3 09/23/2024 0701     12/23/2024 0752     09/23/2024 0701    CO2 28 12/23/2024 0752    CO2 26 09/23/2024 0701    BUN 21 12/23/2024 0752    BUN 16 09/23/2024 0701    CREATININE 0.96 12/23/2024 0752    CREATININE 1.00 05/02/2017 0803    CREATININE 1.00 05/02/2017 0803        Component Value Date/Time    CALCIUM 9.1 12/23/2024 0752    CALCIUM 9.0 09/23/2024 0701    ALKPHOS 103 09/23/2024 0701    AST 16 09/23/2024 0701    ALT 19 09/23/2024 0701    BILITOT 0.9 04/22/2017 0726            Lab Results   Component Value Date    CHOL 124 (L) 01/26/2017    CHOL 203 (H) 03/24/2016     Lab Results   Component Value Date    HDL 38 (L) 09/23/2024    HDL 49 06/18/2018    HDL 51  "04/24/2017     Lab Results   Component Value Date    LDLCALC 76 09/23/2024    LDLCALC 57 06/18/2018    LDLCALC 46 04/24/2017     Lab Results   Component Value Date    TRIG 141 09/23/2024    TRIG 93 06/18/2018    TRIG 66 04/24/2017     No results found for: \"CHOLHDL\"    Imaging: XR foot 3+ vw right  Result Date: 2/12/2025  Narrative: RIGHT FOOT INDICATION:   Pain in right toe(s). Other hammer toe(s) (acquired), right foot. Other specified joint disorders, right ankle and foot.  COMPARISON:  None. VIEWS:  XR FOOT 3+ VW RIGHT Images: 3 FINDINGS: There is no acute fracture or dislocation. There is mild joint space narrowing status in the first IP joint. No other no significant degenerative changes. No lytic or blastic osseous lesion. Soft tissues are unremarkable.     Impression: Mild first IP degenerative changes. No acute osseous abnormality. Electronically signed: 02/12/2025 04:02 PM Manolo King MD      EKG: .    Review of Systems   Constitutional: Negative.   HENT: Negative.     Eyes: Negative.    Cardiovascular: Negative.    Respiratory: Negative.     Endocrine: Negative.    Hematologic/Lymphatic: Negative.    Skin: Negative.    Musculoskeletal: Negative.    Gastrointestinal: Negative.    Genitourinary: Negative.    Neurological: Negative.    Psychiatric/Behavioral: Negative.     Allergic/Immunologic: Negative.        Vitals:    02/13/25 1518   BP: 146/90   Pulse: 72           Physical Exam  Vitals reviewed.   Constitutional:       Appearance: Normal appearance.   HENT:      Head: Normocephalic.      Nose: Nose normal.      Mouth/Throat:      Mouth: Mucous membranes are moist.      Pharynx: Oropharynx is clear.   Eyes:      General: No scleral icterus.     Conjunctiva/sclera: Conjunctivae normal.   Cardiovascular:      Rate and Rhythm: Normal rate and regular rhythm.      Heart sounds: No murmur heard.     No friction rub. No gallop.   Pulmonary:      Effort: Pulmonary effort is normal. No respiratory " distress.      Breath sounds: Normal breath sounds. No wheezing or rales.   Abdominal:      General: Abdomen is flat. Bowel sounds are normal. There is no distension.      Palpations: Abdomen is soft.      Tenderness: There is no abdominal tenderness. There is no guarding.   Musculoskeletal:      Cervical back: Normal range of motion and neck supple.      Right lower leg: No edema.      Left lower leg: No edema.   Skin:     General: Skin is warm and dry.   Neurological:      General: No focal deficit present.      Mental Status: He is alert and oriented to person, place, and time.   Psychiatric:         Mood and Affect: Mood normal.         Behavior: Behavior normal.         Discussion/Summary:    Coronary Artery Disease: History of PCI to the LAD and Distal RCA. LDL is 76.     Hypertension: BP has been up and losartan recently increased.     Discussed dietary modifications and exercise.     I have spent a total time of 25 minutes in caring for this patient on the day of the visit/encounter including Diagnostic results, Prognosis, Risks and benefits of tx options, Instructions for management, Patient and family education, Importance of tx compliance, and Risk factor reductions.

## 2025-02-23 ENCOUNTER — APPOINTMENT (OUTPATIENT)
Dept: LAB | Facility: CLINIC | Age: 60
End: 2025-02-23
Payer: COMMERCIAL

## 2025-02-23 DIAGNOSIS — I10 PRIMARY HYPERTENSION: ICD-10-CM

## 2025-02-23 LAB
ANION GAP SERPL CALCULATED.3IONS-SCNC: 4 MMOL/L (ref 4–13)
BUN SERPL-MCNC: 18 MG/DL (ref 5–25)
CALCIUM SERPL-MCNC: 8.8 MG/DL (ref 8.4–10.2)
CHLORIDE SERPL-SCNC: 105 MMOL/L (ref 96–108)
CO2 SERPL-SCNC: 30 MMOL/L (ref 21–32)
CREAT SERPL-MCNC: 1.05 MG/DL (ref 0.6–1.3)
GFR SERPL CREATININE-BSD FRML MDRD: 78 ML/MIN/1.73SQ M
GLUCOSE P FAST SERPL-MCNC: 123 MG/DL (ref 65–99)
POTASSIUM SERPL-SCNC: 4.8 MMOL/L (ref 3.5–5.3)
SODIUM SERPL-SCNC: 139 MMOL/L (ref 135–147)

## 2025-02-23 PROCEDURE — 80048 BASIC METABOLIC PNL TOTAL CA: CPT

## 2025-02-23 PROCEDURE — 36415 COLL VENOUS BLD VENIPUNCTURE: CPT

## 2025-02-28 ENCOUNTER — OFFICE VISIT (OUTPATIENT)
Age: 60
End: 2025-02-28
Payer: COMMERCIAL

## 2025-02-28 VITALS
SYSTOLIC BLOOD PRESSURE: 132 MMHG | TEMPERATURE: 97.6 F | DIASTOLIC BLOOD PRESSURE: 80 MMHG | HEART RATE: 83 BPM | WEIGHT: 301 LBS | BODY MASS INDEX: 38.65 KG/M2 | OXYGEN SATURATION: 98 %

## 2025-02-28 DIAGNOSIS — M20.41 HAMMER TOE OF RIGHT FOOT: ICD-10-CM

## 2025-02-28 DIAGNOSIS — I10 PRIMARY HYPERTENSION: Primary | ICD-10-CM

## 2025-02-28 DIAGNOSIS — E66.09 CLASS 2 OBESITY DUE TO EXCESS CALORIES WITHOUT SERIOUS COMORBIDITY WITH BODY MASS INDEX (BMI) OF 38.0 TO 38.9 IN ADULT: ICD-10-CM

## 2025-02-28 DIAGNOSIS — E66.812 CLASS 2 OBESITY DUE TO EXCESS CALORIES WITHOUT SERIOUS COMORBIDITY WITH BODY MASS INDEX (BMI) OF 38.0 TO 38.9 IN ADULT: ICD-10-CM

## 2025-02-28 PROCEDURE — 99213 OFFICE O/P EST LOW 20 MIN: CPT

## 2025-02-28 NOTE — ASSESSMENT & PLAN NOTE
Patient has history of hammertoe of right foot as well as predislocation syndrome, follows with podiatry.  Has been fitted for orthotics and recently underwent joint injection with mild to moderate improvement in symptoms.  Encourage patient to continue ambulating, utilizing orthotics, close follow-up with podiatry, and will attempt additional management with as needed Voltaren gel for episodes of pain.    Orders:    Diclofenac Sodium (Voltaren) 1 %; Apply 2 g topically 4 (four) times a day as needed (for pain of the R foot)

## 2025-02-28 NOTE — ASSESSMENT & PLAN NOTE
BP Readings from Last 1 Encounters:   02/28/25 132/80     Blood pressure currently at goal less than 140 over less than 90 at visit today.  Patient states he is tolerating increased dose of losartan, review of most recent labs indicates renal tolerance of medication    Plan:  Continue losartan 100 mg daily  Continue to encourage dietary lifestyle modification  Close follow-up with this office and cardiology for ongoing management  Return in 3 months for recheck of BP and other chronic health conditions

## 2025-02-28 NOTE — ASSESSMENT & PLAN NOTE
Encourage dietary and lifestyle modification  Reviewed proper dietary choices  Offered referrals to nutrition services obesity medicine, patient declined at this point in time.  Offered referrals to medical fitness, patient declined at this point in time  Encourage patient to exercise for 150 minutes/week with suggested method of walking as well as 5 servings of fruits and vegetables  Patient states he has been attempting walking however his hammertoe has made it painful and difficult  Encouraged ongoing close follow-up with podiatry for continued assessment of orthotics and possible ongoing injections for management of pain

## 2025-02-28 NOTE — PROGRESS NOTES
Name: Philippe Zapata      : 1965      MRN: [id number]  Encounter Provider: Herman Ugarte MD  Encounter Date: 2025   Encounter department: [facility]    Assessment & Plan  Primary hypertension  BP Readings from Last 1 Encounters:   25 132/80     Blood pressure currently at goal less than 140 over less than 90 at visit today.  Patient states he is tolerating increased dose of losartan, review of most recent labs indicates renal tolerance of medication    Plan:  Continue losartan 100 mg daily  Continue to encourage dietary lifestyle modification  Close follow-up with this office and cardiology for ongoing management  Return in 3 months for recheck of BP and other chronic health conditions         Class 2 obesity due to excess calories without serious comorbidity with body mass index (BMI) of 38.0 to 38.9 in adult  Encourage dietary and lifestyle modification  Reviewed proper dietary choices  Offered referrals to nutrition services obesity medicine, patient declined at this point in time.  Offered referrals to medical fitness, patient declined at this point in time  Encourage patient to exercise for 150 minutes/week with suggested method of walking as well as 5 servings of fruits and vegetables  Patient states he has been attempting walking however his hammertoe has made it painful and difficult  Encouraged ongoing close follow-up with podiatry for continued assessment of orthotics and possible ongoing injections for management of pain           Hammer toe of right foot  Patient has history of hammertoe of right foot as well as predislocation syndrome, follows with podiatry.  Has been fitted for orthotics and recently underwent joint injection with mild to moderate improvement in symptoms.  Encourage patient to continue ambulating, utilizing orthotics, close follow-up with podiatry, and will attempt additional management with as needed Voltaren gel for episodes of  pain.    Orders:    Diclofenac Sodium (Voltaren) 1 %; Apply 2 g topically 4 (four) times a day as needed (for pain of the R foot)         History of Present Illness     Patient is a 59-year-old male with past medical history of STEMI, hypertension, dyslipidemia, who presents today for reevaluation of blood pressure on increased dose of losartan.  Patient states he has been tolerating the medication without issue, does not notice any adverse effects, feels overall well save for right foot hammertoes for which she is receiving care from podiatry as well as this office.      Review of Systems   Constitutional:  Negative for chills and fever.   HENT:  Negative for ear pain and sore throat.    Eyes:  Negative for pain and visual disturbance.   Respiratory:  Negative for cough and shortness of breath.    Cardiovascular:  Negative for chest pain and palpitations.   Gastrointestinal:  Negative for abdominal pain and vomiting.   Genitourinary:  Negative for dysuria and hematuria.   Musculoskeletal:  Negative for arthralgias and back pain.   Skin:  Negative for color change and rash.   Neurological:  Negative for seizures and syncope.   All other systems reviewed and are negative.    Past Medical History:   Diagnosis Date    CAD (coronary artery disease)     Dyslipidemia     Ear problems     Hyperlipidemia     Hypertension     Obesity     Tear of biceps tendon 3/8/2022     Past Surgical History:   Procedure Laterality Date    ADENOIDECTOMY      COLONOSCOPY N/A 2/19/2019    Procedure: COLONOSCOPY;  Surgeon: Danelle Mena MD;  Location: [facility];  Service: Gastroenterology    CORONARY ANGIOPLASTY WITH STENT PLACEMENT      CORONARY ARTERY BYPASS GRAFT       Family History   Problem Relation Age of Onset    Diabetes Mother         on the mother's side    Liver cancer Father     Stomach cancer Father     Heart failure Maternal Grandmother     Heart attack Maternal Grandfather     Hypertension Family     Hyperlipidemia Neg Hx      Anuerysm Neg Hx     Arrhythmia Neg Hx     Sudden death Neg Hx     Stroke Neg Hx     Clotting disorder Neg Hx      Social History     Tobacco Use    Smoking status: Never    Smokeless tobacco: Never   Vaping Use    Vaping status: Never Used   Substance and Sexual Activity    Alcohol use: Yes     Comment: 2 per week    Drug use: No    Sexual activity: Not on file     Current Outpatient Medications on File Prior to Visit   Medication Sig    aspirin (ECOTRIN LOW STRENGTH) 81 mg EC tablet Take 81 mg by mouth daily    atorvastatin (LIPITOR) 80 mg tablet TAKE 1 TABLET BY MOUTH EVERY DAY    carvedilol (COREG) 12.5 mg tablet TAKE 1 TABLET BY MOUTH 2 TIMES A DAY.    losartan (COZAAR) 100 MG tablet Take 1 tablet (100 mg total) by mouth daily     No Known Allergies  Immunization History   Administered Date(s) Administered    COVID-19 PFIZER VACCINE 0.3 ML IM 03/16/2021, 04/08/2021, 11/17/2021    INFLUENZA 11/17/2021    Influenza Recombinant Preservative Free Im 09/26/2024    Influenza, injectable, quadrivalent, preservative free 0.5 mL 10/28/2019    Influenza, recombinant, quadrivalent,injectable, preservative free 12/11/2018    Pneumococcal Conjugate Vaccine 20-valent (Pcv20), Polysace 09/26/2024    Pneumococcal Polysaccharide PPV23 12/11/2018    Tdap 03/08/2011, 07/09/2021     Objective   /80   Pulse 83   Temp 97.6 °F (36.4 °C)   Wt (!) 137 kg (301 lb)   SpO2 98%   BMI 38.65 kg/m²     Physical Exam  Vitals and nursing note reviewed.   Constitutional:       General: He is not in acute distress.     Appearance: He is well-developed. He is obese. He is not ill-appearing.   HENT:      Head: Normocephalic and atraumatic.      Right Ear: External ear normal.      Left Ear: External ear normal.      Mouth/Throat:      Mouth: Mucous membranes are moist.      Pharynx: Oropharynx is clear.   Eyes:      General:         Right eye: No discharge.         Left eye: No discharge.      Conjunctiva/sclera: Conjunctivae normal.    Cardiovascular:      Rate and Rhythm: Normal rate and regular rhythm.      Heart sounds: No murmur heard.     No friction rub. No gallop.   Pulmonary:      Effort: Pulmonary effort is normal. No respiratory distress.      Breath sounds: Normal breath sounds. No wheezing, rhonchi or rales.   Abdominal:      General: Abdomen is flat.      Palpations: Abdomen is soft. There is no mass.      Tenderness: There is no abdominal tenderness. There is no guarding.   Musculoskeletal:         General: No swelling.      Cervical back: Neck supple.      Right lower leg: No edema.      Left lower leg: No edema.   Skin:     General: Skin is warm and dry.      Capillary Refill: Capillary refill takes less than 2 seconds.   Neurological:      Mental Status: He is alert. Mental status is at baseline.   Psychiatric:         Mood and Affect: Mood normal.

## 2025-03-28 DIAGNOSIS — E78.5 DYSLIPIDEMIA: ICD-10-CM

## 2025-03-28 RX ORDER — ATORVASTATIN CALCIUM 80 MG/1
80 TABLET, FILM COATED ORAL DAILY
Qty: 30 TABLET | Refills: 5 | Status: SHIPPED | OUTPATIENT
Start: 2025-03-28

## 2025-04-09 ENCOUNTER — OFFICE VISIT (OUTPATIENT)
Dept: PODIATRY | Facility: CLINIC | Age: 60
End: 2025-04-09
Payer: COMMERCIAL

## 2025-04-09 VITALS — BODY MASS INDEX: 38.68 KG/M2 | WEIGHT: 301.4 LBS | HEIGHT: 74 IN

## 2025-04-09 DIAGNOSIS — M20.41 HAMMER TOE OF RIGHT FOOT: ICD-10-CM

## 2025-04-09 DIAGNOSIS — M20.11 HAV (HALLUX ABDUCTO VALGUS), RIGHT: ICD-10-CM

## 2025-04-09 DIAGNOSIS — M25.871 PREDISLOCATION SYNDROME OF METATARSOPHALANGEAL JOINT OF RIGHT FOOT: ICD-10-CM

## 2025-04-09 DIAGNOSIS — M79.674 TOE PAIN, RIGHT: Primary | ICD-10-CM

## 2025-04-09 PROCEDURE — 99213 OFFICE O/P EST LOW 20 MIN: CPT | Performed by: PODIATRIST

## 2025-04-09 NOTE — PROGRESS NOTES
"  Name: Philippe Zapata      : 1965      MRN: [id number]  Encounter Provider: Elmer Ritter DPM  Encounter Date: 2025   Encounter department: [facility] [place]    Assessment & Plan     1. Toe pain, right  2. Hammer toe of right foot  3. Predislocation syndrome of metatarsophalangeal joint of right foot  4. Hav (hallux abducto valgus), right      Calf stretches, toe crest pad.  D/w patient if conservative treatment fails consider surgical management with hammertoe repairs 2,3 and consider IPJ hallux fusion based on pressure from the great toe on the second toe.    I personally discussed with patient at the visit today:     The diagnosis of this encounter  2.   Possible etiologies of the diagnosis  3.   Treatment options including advantages and disadvantages of treatment with potential risks and complications associated with these treatments  4.   Prevention strategies and ways to decrease pain     I answered all of the patients questions.      Return in about 3 months (around 2025).    Subjective     Patient noticed some improvement with the injection previously.  He has been icing down after long walks.  Started bothering him in September.  Has inserts for shoes and has improvements.         Constitutional:  Negative for chills and fever.   Respiratory:  Negative for chest tightness and shortness of breath.    Gastrointestinal:  Negative for nausea and vomiting.     Current Outpatient Medications on File Prior to Visit   Medication Sig   • aspirin (ECOTRIN LOW STRENGTH) 81 mg EC tablet Take 81 mg by mouth daily   • atorvastatin (LIPITOR) 80 mg tablet TAKE 1 TABLET BY MOUTH EVERY DAY   • carvedilol (COREG) 12.5 mg tablet TAKE 1 TABLET BY MOUTH 2 TIMES A DAY.   • Diclofenac Sodium (Voltaren) 1 % Apply 2 g topically 4 (four) times a day as needed (for pain of the R foot)   • losartan (COZAAR) 100 MG tablet Take 1 tablet (100 mg total) by mouth daily       Objective     Ht 6' 2\" " (1.88 m)   Wt (!) 137 kg (301 lb 6.4 oz)   BMI 38.70 kg/m²     Vascular: Intact pedal pulses bilateral DP and PT.  Neurological: Gross protective sensation intact bilateral  Musculoskeletal: Muscle strength bilateral intact with dorsiflexion, inversion, eversion and plantarflexion.  Hammertoe contractures noted to the lesser digits 2 and 3 in addition to lateral deviation of the hallux.  Tenderness on palpation noted to this area dorsal second digit and dorsal third toe.  Some pain on palpation noted at the second metatarsophalangeal joint.  Mild irritation noted to this area as well dorsal second PIPJ.  Dermatological: No open lesions or ulcerations noted bilateral.

## 2025-04-09 NOTE — PATIENT INSTRUCTIONS
Patient Education     Calf Stretches   About this topic   Your calf muscles can get tight and are often injured while playing sports. Calf stretches help to make the muscles longer. There are many ways to stretch the calf muscles.  General   Before starting with a program, ask your doctor if you are healthy enough to do these exercises. Your doctor may have you work with a  or physical therapist to make a safe exercise program to meet your needs.  Stretching Exercises   Stretching exercises keep your muscles flexible. They also stop them from getting tight. Start by doing each of these stretches 2 to 3 times. In order for your body to make changes, you will need to hold these stretches for 20 to 30 seconds. Repeat each exercise 2 to 3 times each day. Do all exercises slowly.  Calf stretches standing ? Stand about 12 to 18 inches (30 to 45 cm) away from a wall. Place your hands on the wall at shoulder level. Lean forward.  Knee straight - Stretch your left leg straight behind you. Make sure the left heel is flat on the floor and the left knee is straight. Now, bend the knee of the right leg until you feel a stretch in your left calf. Be sure that the heel does not come up. Repeat on the other side.  Knee bent - Take a small step forward with your left leg so your feet are slightly closer together. With your right leg bent, bend your left knee forward until you feel a stretch in the back of the calf of your left leg. This will feel strange, but it is the best way to stretch this calf muscle. Repeat on the other side.  Calf stretches seated with belt or towel ? Sit on a chair or on the floor with your legs straight out in front of you. Loop a belt or towel around the ball of one foot. Pull on the towel until you feel a stretch in the back of your calf. Repeat on the other foot.  Calf stretches lying down with belt or towel ? Lie on your back with both legs straight. Loop a belt or towel around the ball of one  foot. Lift the leg up, keeping the knee straight until you feel a stretch in the back of your thigh. Pull down on the belt or towel to bend your foot more for a better stretch. Repeat on the other foot. This stretch gets both your calf and the hamstrings on the back of your thigh.  Calf stretches on stairs ? Stand on a step and hold onto a rail. Position your feet so only the balls of your feet are on the step. Lower both heels until you feel a stretch in the back of your calves. Do not do this stretch if you have trouble with balance.  Counter stretches ? Stand a few feet away from a counter. Put your hands on the counter, shoulder width apart. Lean forward and bend your elbows as if you were doing a push-up on the counter. Be sure to keep your back straight and your heels flat on the floor. You should feel a stretch in the back of your calves.             What will the results be?   Less pain and stiffness  Better flexibility and range of motion  Less cramping  Better ankle mobility  Easier to walk and do other activities  Less chance of injury during sports  Helpful tips   Stay active and work out to keep your muscles strong and flexible.  Keep a healthy weight so there is not extra stress on your joints. Eat a healthy diet to keep your muscles healthy.  Be sure you do not hold your breath when exercising. This can raise your blood pressure. If you tend to hold your breath, try counting out loud when exercising. If any exercise bothers you, stop right away.  Always warm up before stretching. Heated muscles stretch much easier than cool muscles. Stretching cool muscles can lead to injury.  Try walking or cycling at an easy pace for a few minutes to warm up your muscles. Do this again after exercising.  Never bounce when doing stretches.  Doing exercises before a meal may be a good way to get into a routine.  Exercise may be slightly uncomfortable, but you should not have sharp pains. If you do get sharp pains,  stop what you are doing. If the sharp pains continue, call your doctor.  Last Reviewed Date   2021-07-26  Consumer Information Use and Disclaimer   This generalized information is a limited summary of diagnosis, treatment, and/or medication information. It is not meant to be comprehensive and should be used as a tool to help the user understand and/or assess potential diagnostic and treatment options. It does NOT include all information about conditions, treatments, medications, side effects, or risks that may apply to a specific patient. It is not intended to be medical advice or a substitute for the medical advice, diagnosis, or treatment of a health care provider based on the health care provider's examination and assessment of a patient’s specific and unique circumstances. Patients must speak with a health care provider for complete information about their health, medical questions, and treatment options, including any risks or benefits regarding use of medications. This information does not endorse any treatments or medications as safe, effective, or approved for treating a specific patient. [employer] and its affiliates disclaim any warranty or liability relating to this information or the use thereof. The use of this information is governed by the Terms of Use, available at https://www.woltersNiblitzuwer.com/en/know/clinical-effectiveness-terms   Copyright   Copyright © 2024 [employer], [employer] and its affiliates and/or licensors. All rights reserved.

## 2025-04-24 DIAGNOSIS — I25.10 CAD (CORONARY ARTERY DISEASE): ICD-10-CM

## 2025-04-24 RX ORDER — CARVEDILOL 12.5 MG/1
12.5 TABLET ORAL 2 TIMES DAILY
Qty: 180 TABLET | Refills: 1 | Status: SHIPPED | OUTPATIENT
Start: 2025-04-24

## 2025-05-11 DIAGNOSIS — I10 PRIMARY HYPERTENSION: ICD-10-CM

## 2025-05-12 RX ORDER — LOSARTAN POTASSIUM 100 MG/1
100 TABLET ORAL DAILY
Qty: 90 TABLET | Refills: 1 | Status: SHIPPED | OUTPATIENT
Start: 2025-05-12

## 2025-07-09 ENCOUNTER — OFFICE VISIT (OUTPATIENT)
Dept: PODIATRY | Facility: CLINIC | Age: 60
End: 2025-07-09
Payer: COMMERCIAL

## 2025-07-09 VITALS — HEIGHT: 74 IN | BODY MASS INDEX: 39.3 KG/M2 | WEIGHT: 306.2 LBS

## 2025-07-09 DIAGNOSIS — M79.674 TOE PAIN, RIGHT: Primary | ICD-10-CM

## 2025-07-09 DIAGNOSIS — M25.871 PREDISLOCATION SYNDROME OF METATARSOPHALANGEAL JOINT OF RIGHT FOOT: ICD-10-CM

## 2025-07-09 DIAGNOSIS — M20.11 HAV (HALLUX ABDUCTO VALGUS), RIGHT: ICD-10-CM

## 2025-07-09 DIAGNOSIS — M20.41 HAMMER TOE OF RIGHT FOOT: ICD-10-CM

## 2025-07-09 PROCEDURE — 99213 OFFICE O/P EST LOW 20 MIN: CPT | Performed by: PODIATRIST

## 2025-07-09 NOTE — PROGRESS NOTES
"Name: Philippe Zapata      : 1965      MRN: [id number]  Encounter Provider: Elmer Ritter DPM  Encounter Date: 2025   Encounter department: [facility] [place]  :  Assessment & Plan  Toe pain, right  Follow up as needed if pain increases.  Continue with inserts.  Voltaren gel as needed.           Hammer toe of right foot         Predislocation syndrome of metatarsophalangeal joint of right foot         Hav (hallux abducto valgus), right             History of Present Illness   HPI  Philippe Zapata is a 60 y.o. male who presents for follow up on toe pain and has been using voltaren gel and notices improvement with this and is able to do walks that he likes.    Patient states he is doing well denies any new acute changes.        Review of Systems       Objective   Ht 6' 2\" (1.88 m) Comment: verbal  Wt (!) 139 kg (306 lb 3.2 oz)   BMI 39.31 kg/m²      Physical Exam    Patient has a lateral deviation of the hallux with hammertoe contractures noted to the lesser digits.  There is some mild discomfort noted on palpation at the level of the metatarsophalangeal joint second.  There is intact pedal pulses.  Neurological sensation is grossly intact distally.  No other areas of acute changes noted at this time.  No significant swelling bruising or ecchymosis no signs of acute issues at this point.      "